# Patient Record
Sex: FEMALE | Race: WHITE | NOT HISPANIC OR LATINO | Employment: UNEMPLOYED | URBAN - NONMETROPOLITAN AREA
[De-identification: names, ages, dates, MRNs, and addresses within clinical notes are randomized per-mention and may not be internally consistent; named-entity substitution may affect disease eponyms.]

---

## 2018-03-12 ENCOUNTER — DOCUMENTATION ONLY (OUTPATIENT)
Dept: FAMILY MEDICINE | Facility: OTHER | Age: 28
End: 2018-03-12

## 2018-03-12 RX ORDER — IBUPROFEN 200 MG
600 TABLET ORAL EVERY 6 HOURS PRN
COMMUNITY
Start: 2016-09-30 | End: 2018-05-01

## 2018-03-12 RX ORDER — FERROUS GLUCONATE 324(38)MG
324 TABLET ORAL
COMMUNITY
Start: 2016-09-30 | End: 2018-05-01

## 2018-03-12 RX ORDER — DOCUSATE SODIUM 100 MG/1
100 CAPSULE, LIQUID FILLED ORAL DAILY PRN
COMMUNITY
Start: 2016-09-30 | End: 2018-05-01

## 2018-03-12 RX ORDER — ACETAMINOPHEN 325 MG/1
650 TABLET ORAL EVERY 4 HOURS PRN
COMMUNITY
Start: 2016-09-30 | End: 2018-05-01

## 2018-03-12 RX ORDER — BREAST PUMP
EACH MISCELLANEOUS
COMMUNITY
Start: 2016-11-09 | End: 2018-05-01

## 2018-04-20 ENCOUNTER — TELEPHONE (OUTPATIENT)
Dept: FAMILY MEDICINE | Facility: OTHER | Age: 28
End: 2018-04-20

## 2018-04-20 DIAGNOSIS — Z32.01 PREGNANCY TEST POSITIVE: Primary | ICD-10-CM

## 2018-04-20 NOTE — TELEPHONE ENCOUNTER
----- Message from Lizabeth Archibald sent at 4/20/2018 10:55 AM CDT -----  Regarding: Initial OB Px  Patient is coming in for her Initial Ob Px on 05/11/2018 at 9:00AM. Patient is unaware of LMP exact date. She thinks she's about 5 1/2 weeks along with her pregnancy.       Lizabeth Archibald on 4/20/2018 at 11:02 AM

## 2018-04-20 NOTE — TELEPHONE ENCOUNTER
Call patient - if she is not sure of LMP, she should have an ultrasound done in the next 1-2 weeks.  Order is placed.  Pia Tong MD

## 2018-04-20 NOTE — TELEPHONE ENCOUNTER
Patient notified of below. She requests to wait for schedule to call get US set up.      Jalyn Bullard LPN.................. 4/20/2018 11:27 AM

## 2018-04-30 ENCOUNTER — HOSPITAL ENCOUNTER (OUTPATIENT)
Dept: ULTRASOUND IMAGING | Facility: OTHER | Age: 28
Discharge: HOME | End: 2018-04-30
Attending: FAMILY MEDICINE | Admitting: FAMILY MEDICINE
Payer: COMMERCIAL

## 2018-04-30 DIAGNOSIS — Z32.01 PREGNANCY TEST POSITIVE: ICD-10-CM

## 2018-04-30 PROCEDURE — 76801 OB US < 14 WKS SINGLE FETUS: CPT

## 2018-04-30 PROCEDURE — 76817 TRANSVAGINAL US OBSTETRIC: CPT

## 2018-05-01 ENCOUNTER — OFFICE VISIT (OUTPATIENT)
Dept: FAMILY MEDICINE | Facility: OTHER | Age: 28
End: 2018-05-01
Attending: FAMILY MEDICINE
Payer: COMMERCIAL

## 2018-05-01 VITALS
SYSTOLIC BLOOD PRESSURE: 112 MMHG | WEIGHT: 143 LBS | DIASTOLIC BLOOD PRESSURE: 54 MMHG | HEIGHT: 65 IN | HEART RATE: 92 BPM | BODY MASS INDEX: 23.82 KG/M2

## 2018-05-01 DIAGNOSIS — Z36.9 PRENATAL SCREENING ENCOUNTER: Primary | ICD-10-CM

## 2018-05-01 LAB
ABO + RH BLD: NORMAL
ABO + RH BLD: NORMAL
B-HCG SERPL-ACNC: NORMAL IU/L
SPECIMEN EXP DATE BLD: NORMAL

## 2018-05-01 PROCEDURE — 84702 CHORIONIC GONADOTROPIN TEST: CPT | Performed by: FAMILY MEDICINE

## 2018-05-01 PROCEDURE — 36415 COLL VENOUS BLD VENIPUNCTURE: CPT | Performed by: FAMILY MEDICINE

## 2018-05-01 PROCEDURE — 99214 OFFICE O/P EST MOD 30 MIN: CPT | Performed by: FAMILY MEDICINE

## 2018-05-01 PROCEDURE — 86901 BLOOD TYPING SEROLOGIC RH(D): CPT | Performed by: FAMILY MEDICINE

## 2018-05-01 PROCEDURE — 86900 BLOOD TYPING SEROLOGIC ABO: CPT | Performed by: FAMILY MEDICINE

## 2018-05-01 RX ORDER — PRENATAL VIT/IRON FUM/FOLIC AC 27MG-0.8MG
1 TABLET ORAL DAILY
Qty: 100 TABLET | Refills: 3 | COMMUNITY
Start: 2018-05-01 | End: 2018-05-15

## 2018-05-01 ASSESSMENT — PAIN SCALES - GENERAL: PAINLEVEL: NO PAIN (0)

## 2018-05-01 NOTE — NURSING NOTE
Patient presents to the clinic today for a follow up from her US.    Jalyn Bullard LPN.................. 5/1/2018 9:49 AM

## 2018-05-01 NOTE — MR AVS SNAPSHOT
After Visit Summary   5/1/2018    Jenny Hadley    MRN: [id number]           Patient Information     Date Of Birth          1990        Visit Information        Provider Department      5/1/2018 9:45 AM Pia Rangel MD [facility]        Today's Diagnoses     Prenatal screening encounter    -  1       Follow-ups after your visit        Your next 10 appointments already scheduled     May 03, 2018  9:00 AM CDT   [facility]    [address] [facility] [address]   [phone]           Please do not eat 10-12 hours before your appointment if you are coming in fasting for labs on lipids, cholesterol, or glucose (sugar). This does not apply to pregnant women. Water, hot tea and black coffee (with nothing added) are okay. Do not drink other fluids, diet soda or chew gum.            May 11, 2018  9:00 AM CDT   New Prenatal with Pia Stafford MD   [facility]    [address]  [place] [address]   [phone]              Future tests that were ordered for you today     Open Future Orders        Priority Expected Expires Ordered    HCG quantitative pregnancy Routine  5/1/2019 5/1/2018            Who to contact     If you have questions or need follow up information about today's clinic visit or your schedule please contact [facility] directly at [phone].  Normal or non-critical lab and imaging results will be communicated to you by MyChart, letter or phone within 4 business days after the clinic has received the results. If you do not hear from us within 7 days, please contact the clinic through [facility] or phone. If you have a critical or abnormal lab result, we will notify you by phone as soon as possible.  Submit refill requests through [facility] or call your  "pharmacy and they will forward the refill request to us. Please allow 3 business days for your refill to be completed.          Additional Information About Your Visit        MyChart Information     [facility] lets you send messages to your doctor, view your test results, renew your prescriptions, schedule appointments and more. To sign up, go to [email or web address] . Click on \"Log in\" on the left side of the screen, which will take you to the Welcome page. Then click on \"Sign up Now\" on the right side of the page.     You will be asked to enter the access code listed below, as well as some personal information. Please follow the directions to create your username and password.     Your access code is: [email or web address]  Expires: 2018  2:00 PM     Your access code will  in 90 days. If you need help or a new code, please call your [place] clinic or [phone].        Care EveryWhere ID     This is your Care EveryWhere ID. This could be used by other organizations to access your [place] medical records  [id number]        Your Vitals Were     Pulse Height Last Period Breastfeeding? BMI (Body Mass Index)       92 5' 5.35\" (1.66 m) 03/10/2018 No 23.54 kg/m2        Blood Pressure from Last 3 Encounters:   18 112/54   16 100/62   16 110/70    Weight from Last 3 Encounters:   18 143 lb (64.9 kg)   16 147 lb 12.8 oz (67 kg)   16 173 lb 6.4 oz (78.7 kg)              We Performed the Following     ABO and Rh     HCG quantitative pregnancy          Today's Medication Changes          These changes are accurate as of 18  2:00 PM.  If you have any questions, ask your nurse or doctor.               Stop taking these medicines if you haven't already. Please contact your care team if you have questions.     acetaminophen 325 MG tablet   Commonly known as:  TYLENOL   Stopped by:  Pia Rangel MD           breast pump Misc   Stopped by:  Pia Rangel MD "           docusate sodium 100 MG capsule   Commonly known as:  COLACE   Stopped by:  Pia Rangel MD           ferrous gluconate 324 (38 Fe) MG tablet   Commonly known as:  FERGON   Stopped by:  Pia Rangel MD           ibuprofen 200 MG tablet   Commonly known as:  ADVIL/MOTRIN   Stopped by:  Pia Rangel MD           IRON PO   Stopped by:  Pia Rangel MD                    Primary Care Provider Office Phone # Fax #    Pia Stafford [phone] [address]  [facility] [address]        Equal Access to Services     [facility]: Hadii aad ku hadasho Soomaali, waaxda luqadaha, qaybta kaalmada adeegyada, dank kemp . So [facility] [phone].    ATENCIÓN: Si habla español, tiene a sagastume disposición servicios gratuitos de asistencia lingüística. Llame al [phone].    We comply with applicable federal civil rights laws and [place] laws. We do not discriminate on the basis of race, color, national origin, age, disability, sex, sexual orientation, or gender identity.            Thank you!     Thank you for choosing [facility] AND [facility]  for your care. Our goal is always to provide you with excellent care. Hearing back from our patients is one way we can continue to improve our services. Please take a few minutes to complete the written survey that you may receive in the mail after your visit with us. Thank you!             Your Updated Medication List - Protect others around you: Learn how to safely use, store and throw away your medicines at www.disposemymeds.org.          This list is accurate as of 5/1/18  2:00 PM.  Always use your most recent med list.                   Brand Name Dispense Instructions for use Diagnosis    prenatal multivitamin plus iron 27-0.8 MG Tabs per tablet     100 tablet    Take 1 tablet by mouth daily

## 2018-05-01 NOTE — PROGRESS NOTES
"Nursing Notes:   Jalyn Bullard LPN  2018 10:17 AM  Signed  Patient presents to the clinic today for a follow up from her US.    Jalyn Bullard LPN.................. 2018 9:49 AM       SUBJECTIVE:   CC:  Jenny Hadley is a 27 year old female who presents to clinic today for the following health issues:  Follow up after ultrasound.    HPI     Jenny Hadley is a 27 year old female is in with her  for follow-up after her ultrasound done yesterday.  She is  2 para 1, presently 7 weeks gestational age by LMP.  She has had some mild cramping, some brown vaginal discharge but no bright red bleeding.  She has felt nauseous, breast tenderness, and fatigue.  No fevers.  Allergies   Allergen Reactions     Cats Itching and Shortness Of Breath     Current Outpatient Prescriptions   Medication     Prenatal Vit-Fe Fumarate-FA (PRENATAL MULTIVITAMIN PLUS IRON) 27-0.8 MG TABS per tablet     No current facility-administered medications for this visit.       Patient Active Problem List    Diagnosis Date Noted     Encounter for prenatal care in third trimester of first pregnancy 2016     Priority: Medium     Past Surgical History:   Procedure Laterality Date     DENTAL SURGERY      wisdom teeth     OTHER SURGICAL HISTORY      2016,MBV636,VAGINAL DELIVERY     Family History   Problem Relation Age of Onset     Breast Cancer Paternal Grandmother      Cancer-breast     Breast Cancer Paternal Aunt      Cancer-breast       Review of Systems     PHQ-2 Score:     PHQ-2 (  [employer]) 2018   Q1: Little interest or pleasure in doing things 0   Q2: Feeling down, depressed or hopeless 0   PHQ-2 Score 0         PHQ-9 SCORE 2016   Total Score 7         OBJECTIVE:     /54 (BP Location: Right arm, Patient Position: Sitting, Cuff Size: Adult Regular)  Pulse 92  Ht 5' 5.35\" (1.66 m)  Wt 143 lb (64.9 kg)  LMP 03/10/2018  Breastfeeding? No  BMI 23.54 kg/m2  Body mass index is 23.54 " kg/(m^2).  Physical Exam   Constitutional:   Tired appearing   Nursing note and vitals reviewed.   Procedure:  US OB < 14 WEEKS SINGLE     Gestational age by LMP: 7 weeks 2 days     Clinical history: Early pregnancy, evaluate size and dates.     Comparisons: None.     Gestation number: There is an intrauterine gestational sac with  irregular margins. No fetal pole or yolk sac is seen.     Cardiac activity:  Not seen     Measurements:      CRL:  Not seen     Gest. Sac:  7 weeks 4 days     Adnexa:  Right ovary not seen. There is a 4.0 x 3.5 x 3.1 cm left  ovarian cyst. Mild free fluid is present in the pelvis.     Ultrasound Age:  7 weeks 4 days     Ultrasound EDC:  12/13/2018         Impression:  Intrauterine gestational sac is irregular. No fetal pole  or yolk sac is seen. This may represent an early pregnancy or a failed  pregnancy. Suggest continued follow-up and serial beta hCG  measurements.     ERASTO RIDDLE MD    Results for orders placed or performed in visit on 05/01/18   HCG quantitative pregnancy   Result Value Ref Range    HCG Quantitative Serum 77191 IU/L   ABO and Rh   Result Value Ref Range    ABO A     RH(D) Pos     Specimen Expires 05/04/2018          ASSESSMENT/PLAN:       ICD-10-CM    1. Prenatal screening encounter Z36.9 HCG quantitative pregnancy     ABO and Rh     HCG quantitative pregnancy            PLAN:  1.  Discussed with patient and her  the differential diagnosis of these current findings.  This could include early pregnancy, ectopic pregnancy, pending miscarriage, molar pregnancy.  Plan will be to have the patient return to clinic in 2 days, she will have a follow-up hCG level done then.  I have asked Dr.: Chávez to check for these results.  If prior to then, patient should develop abdominal pain, vaginal bleeding, she is to contact the clinic and I would recommend an urgent appointment.  Otherwise, next follow-up and follow-up ultrasound timing will depend upon her next  lab values.      GEMMA BAILEY MD  [facility]

## 2018-05-02 ENCOUNTER — NURSE TRIAGE (OUTPATIENT)
Dept: FAMILY MEDICINE | Facility: OTHER | Age: 28
End: 2018-05-02

## 2018-05-02 NOTE — TELEPHONE ENCOUNTER
"Patient states she had an ultrasound and does not likely have a viable pregnancy. She started bleeding about 4 PM today and was told to call if she started bleeding. No openings available with [facility] providers tomorrow. Patient offered and accepted appointment with Dr. Wily Mars MD, FACOG tomorrow afternoon. She will present to ER with any heavy bleeding, abdominal pain, shoulder pain, or if she is feeling weak or lightheaded.    Neelima De La Rosa RN...................5/2/2018 4:40 PM    Reason for Disposition    MILD vaginal bleeding (i.e., clots or similar to menstrual period; not just spotting)    Additional Information    Negative: Shock suspected (e.g., cold/pale/clammy skin, too weak to stand, low BP, rapid pulse)    Negative: Difficult to awaken or acting confused  (e.g., disoriented, slurred speech)    Negative: Passed out (i.e., lost consciousness, collapsed and was not responding)    Negative: Sounds like a life-threatening emergency to the triager    Negative: [1] Vaginal bleeding AND [2] pregnant > 20 weeks    Negative: Not pregnant or pregnancy status unknown    Negative: SEVERE abdominal pain    Negative: [1] SEVERE vaginal bleeding (i.e., soaking 2 pads / hour, large blood clots) AND [2] present 2 or more hours    Negative: [1] MODERATE vaginal bleeding (i.e., soaking 1 pad / hour; clots) AND [2] present > 6 hours    Negative: [1] MODERATE vaginal bleeding (i.e., soaking 1 pad / hour; clots) AND [2] pregnant > 12 weeks    Negative: Passed tissue (e.g., gray-white)    Negative: Shoulder pain    Negative: Pale skin (pallor) of new onset or worsening    Negative: Patient sounds very sick or weak to the triager    Negative: [1] Constant abdominal pain AND [2] present > 2 hours    Negative: Fever > 100.4 F (38.0 C)    Negative: [1] Intermittent lower abdominal pain (e.g., cramping) AND [2] present > 24 hours    Negative: Prior history of \"ectopic pregnancy\" or previous tubal surgery (e.g., tubal " "ligation)    Negative: Burning with urination    Negative: Has IUD    Answer Assessment - Initial Assessment Questions  1. ONSET: \"When did this bleeding start?\"        Less than 20 minutes ago  2. DESCRIPTION: \"Describe the bleeding that you are having.\" \"How much bleeding is there?\"     - SPOTTING: spotting, or pinkish / brownish mucous discharge; does not fill panti-liner or pad     - MILD:  less than 1 pad / hour; less than patient's usual menstrual bleeding    - MODERATE: 1-2 pads / hour; small-medium blood clots (e.g., pea, grape, small coin)     - SEVERE: soaking 2 or more pads/hour for 2 or more hours; bleeding not contained by pads or continuous red blood from vagina; large blood clots (e.g., golf ball, large coin)       Spotting red  3. ABDOMINAL PAIN SEVERITY: If present, ask: \"How bad is it?\"  (e.g., Scale 1-10; mild, moderate, or severe)    - MILD (1-3): doesn't interfere with normal activities, abdomen soft and not tender to touch     - MODERATE (4-7): interferes with normal activities or awakens from sleep, tender to touch     - SEVERE (8-10): excruciating pain, doubled over, unable to do any normal activities      0  4. PREGNANCY: \"Do you know how many weeks or months pregnant you are?\" \"When was the first day of your last normal menstrual period?\"      About 7 weeks  5. HEMODYNAMIC STATUS: \"Are you weak or feeling lightheaded?\" If so, ask: \"Can you stand and walk normally?\"       no  6. OTHER SYMPTOMS: \"What other symptoms are you having with the bleeding?\" (e.g., passed tissue, vaginal discharge, fever, menstrual-type cramps)      no    Protocols used: PREGNANCY - VAGINAL BLEEDING LESS THAN 20 WEEKS [id number]    "

## 2018-05-03 ENCOUNTER — OFFICE VISIT (OUTPATIENT)
Dept: OBGYN | Facility: OTHER | Age: 28
End: 2018-05-03
Attending: OBSTETRICS & GYNECOLOGY
Payer: COMMERCIAL

## 2018-05-03 VITALS
SYSTOLIC BLOOD PRESSURE: 102 MMHG | BODY MASS INDEX: 23.54 KG/M2 | WEIGHT: 143 LBS | DIASTOLIC BLOOD PRESSURE: 62 MMHG | HEART RATE: 82 BPM

## 2018-05-03 DIAGNOSIS — O02.1 MISSED ABORTION: Primary | ICD-10-CM

## 2018-05-03 PROCEDURE — 99213 OFFICE O/P EST LOW 20 MIN: CPT | Performed by: OBSTETRICS & GYNECOLOGY

## 2018-05-03 ASSESSMENT — PAIN SCALES - GENERAL: PAINLEVEL: NO PAIN (0)

## 2018-05-03 NOTE — LETTER
5/3/2018       RE: Jenny Hadley  [address]   [place] [address]     Dear Colleague,    Thank you for referring your patient, Jenny Hadley, to the [facility]. Please see a copy of my visit note below.    OB Consult Note    Jenny Hadley is referred by Pia Rangel due to missed     She is a 27 year old     Patient's last menstrual period was 03/10/2018.     Her prior obstetrical history is notable for  x 1.    Past Medical History:   Diagnosis Date     Encounter for supervision of normal first pregnancy     2016     Past Surgical History:   Procedure Laterality Date     DENTAL SURGERY      wisdom teeth     OTHER SURGICAL HISTORY      2016,FPK250,VAGINAL DELIVERY     Current Outpatient Prescriptions   Medication     Prenatal Vit-Fe Fumarate-FA (PRENATAL MULTIVITAMIN PLUS IRON) 27-0.8 MG TABS per tablet     No current facility-administered medications for this visit.      Allergies   Allergen Reactions     Cats Itching and Shortness Of Breath       REVIEW OF SYSTEMS  Neg except as above    Exam:  /62 (BP Location: Right arm)  Pulse 82  Wt 64.9 kg (143 lb)  LMP 03/10/2018  Breastfeeding? No  BMI 23.54 kg/m2    NAD    Last or relevant US information:   IUP with irregular shaped sac  US repeated in office with no change. No fetal pole with irregular gestational sac. 4 cm simple cyst on the left.      I/P:  Missed     Discussed conservative management versus operative versus medical.  A pos BT  She wishes expectancy at this time.    F/U is scheduled with PCJ next week. Consider repeat US if she passes tissue to ascertain whether AB is completed.    Again, thank you for allowing me to participate in the care of your patient.      Sincerely,    Wily Mars MD

## 2018-05-03 NOTE — PROGRESS NOTES
OB Consult Note    Jenny Hadley is referred by Pia Rangel due to missed     She is a 27 year old     Patient's last menstrual period was 03/10/2018.     Her prior obstetrical history is notable for  x 1.    Past Medical History:   Diagnosis Date     Encounter for supervision of normal first pregnancy     2016     Past Surgical History:   Procedure Laterality Date     DENTAL SURGERY      wisdom teeth     OTHER SURGICAL HISTORY      2016,GZT335,VAGINAL DELIVERY     Current Outpatient Prescriptions   Medication     Prenatal Vit-Fe Fumarate-FA (PRENATAL MULTIVITAMIN PLUS IRON) 27-0.8 MG TABS per tablet     No current facility-administered medications for this visit.      Allergies   Allergen Reactions     Cats Itching and Shortness Of Breath       REVIEW OF SYSTEMS  Neg except as above    Exam:  /62 (BP Location: Right arm)  Pulse 82  Wt 64.9 kg (143 lb)  LMP 03/10/2018  Breastfeeding? No  BMI 23.54 kg/m2    NAD    Last or relevant US information:   IUP with irregular shaped sac  US repeated in office with no change. No fetal pole with irregular gestational sac. 4 cm simple cyst on the left.      I/P:  Missed     Discussed conservative management versus operative versus medical.  A pos BT  She wishes expectancy at this time.    F/U is scheduled with PCJ next week. Consider repeat US if she passes tissue to ascertain whether AB is completed.

## 2018-05-03 NOTE — MR AVS SNAPSHOT
"              After Visit Summary   5/3/2018    Jenny Hadley    MRN: [id number]           Patient Information     Date Of Birth          1990        Visit Information        Provider Department      5/3/2018 2:30 PM Wily Mars MD [facility]        Today's Diagnoses     Missed     -  1       Follow-ups after your visit        Your next 10 appointments already scheduled     May 11, 2018  9:00 AM CDT   New Prenatal with Pia Stafford MD   [facility] ([facility])    [address]  [place] [id number] [phone]              Who to contact     If you have questions or need follow up information about today's clinic visit or your schedule please contact [facility] directly at [phone].  Normal or non-critical lab and imaging results will be communicated to you by GetMyRxhart, letter or phone within 4 business days after the clinic has received the results. If you do not hear from us within 7 days, please contact the clinic through GetMyRxhart or phone. If you have a critical or abnormal lab result, we will notify you by phone as soon as possible.  Submit refill requests through Remote or call your pharmacy and they will forward the refill request to us. Please allow 3 business days for your refill to be completed.          Additional Information About Your Visit        GetMyRxhart Information     [facility] lets you send messages to your doctor, view your test results, renew your prescriptions, schedule appointments and more. To sign up, go to [email or web address] . Click on \"Log in\" on the left side of the screen, which will take you to the Welcome page. Then click on \"Sign up Now\" on the right side of the page.     You will be asked to enter the access code listed below, as well as some personal information. Please follow the directions to create your username and password.     Your " access code is: [email or web address]  Expires: 2018  2:00 PM     Your access code will  in 90 days. If you need help or a new code, please call your [place] clinic or [phone].        Care EveryWhere ID     This is your Care EveryWhere ID. This could be used by other organizations to access your [place] medical records  [id number]        Your Vitals Were     Pulse Last Period Breastfeeding? BMI (Body Mass Index)          82 03/10/2018 No 23.54 kg/m2         Blood Pressure from Last 3 Encounters:   18 102/62   18 112/54   16 100/62    Weight from Last 3 Encounters:   18 64.9 kg (143 lb)   18 64.9 kg (143 lb)   16 67 kg (147 lb 12.8 oz)              Today, you had the following     No orders found for display       Primary Care Provider Office Phone # Fax #    Pia CARBAJAL Ifeanyi Stafford [phone]       [address] [facility] [address]        Equal Access to Services     [facility]: Hadii aad ku hadsav Soabdoul, waaxda lumegan, qaybta kaalshayna cruz, dank kemp . So [facility] [phone].    ATENCIÓN: Si habla español, tiene a sagastume disposición servicios gratuitos de asistencia lingüística. [facility] [phone].    We comply with applicable federal civil rights laws and [place] laws. We do not discriminate on the basis of race, color, national origin, age, disability, sex, sexual orientation, or gender identity.            Thank you!     Thank you for choosing [facility] AND [facility]  for your care. Our goal is always to provide you with excellent care. Hearing back from our patients is one way we can continue to improve our services. Please take a few minutes to complete the written survey that you may receive in the mail after your visit with us. Thank you!             Your Updated Medication List - Protect others around you: Learn how to safely use, store and throw away your medicines at  www.disposemymeds.org.          This list is accurate as of 5/3/18  3:58 PM.  Always use your most recent med list.                   Brand Name Dispense Instructions for use Diagnosis    prenatal multivitamin plus iron 27-0.8 MG Tabs per tablet     100 tablet    Take 1 tablet by mouth daily

## 2018-05-04 PROBLEM — Z78.9 HISTORY OF PRIOR PREGNANCIES: Status: ACTIVE | Noted: 2018-01-01

## 2018-05-11 ENCOUNTER — HOSPITAL ENCOUNTER (OUTPATIENT)
Dept: ULTRASOUND IMAGING | Facility: OTHER | Age: 28
Discharge: HOME | End: 2018-05-11
Attending: FAMILY MEDICINE | Admitting: FAMILY MEDICINE
Payer: COMMERCIAL

## 2018-05-11 ENCOUNTER — PRENATAL OFFICE VISIT (OUTPATIENT)
Dept: FAMILY MEDICINE | Facility: OTHER | Age: 28
End: 2018-05-11
Attending: FAMILY MEDICINE
Payer: COMMERCIAL

## 2018-05-11 VITALS
SYSTOLIC BLOOD PRESSURE: 94 MMHG | WEIGHT: 139 LBS | HEIGHT: 65 IN | DIASTOLIC BLOOD PRESSURE: 60 MMHG | BODY MASS INDEX: 23.16 KG/M2 | HEART RATE: 88 BPM

## 2018-05-11 DIAGNOSIS — O03.4 INCOMPLETE MISCARRIAGE: Primary | ICD-10-CM

## 2018-05-11 DIAGNOSIS — O03.9 MISCARRIAGE: Primary | ICD-10-CM

## 2018-05-11 DIAGNOSIS — O03.4 INCOMPLETE MISCARRIAGE: ICD-10-CM

## 2018-05-11 LAB — B-HCG SERPL-ACNC: NORMAL IU/L

## 2018-05-11 PROCEDURE — 84702 CHORIONIC GONADOTROPIN TEST: CPT | Performed by: FAMILY MEDICINE

## 2018-05-11 PROCEDURE — 99213 OFFICE O/P EST LOW 20 MIN: CPT | Performed by: FAMILY MEDICINE

## 2018-05-11 PROCEDURE — 36415 COLL VENOUS BLD VENIPUNCTURE: CPT | Performed by: FAMILY MEDICINE

## 2018-05-11 PROCEDURE — 76801 OB US < 14 WKS SINGLE FETUS: CPT

## 2018-05-11 PROCEDURE — 76817 TRANSVAGINAL US OBSTETRIC: CPT

## 2018-05-11 NOTE — NURSING NOTE
Patient presents to the clinic today for a follow up on a miscarriage. She started bleeding on 5/3/18.    Jalyn Bullard LPN.................. 5/11/2018 9:00 AM

## 2018-05-11 NOTE — MR AVS SNAPSHOT
"              After Visit Summary   2018    Jenny Hadley    MRN: [id number]           Patient Information     Date Of Birth          1990        Visit Information        Provider Department      2018 9:00 AM Pia Rangel MD [facility]        Today's Diagnoses     Miscarriage    -  1       Follow-ups after your visit        Who to contact     If you have questions or need follow up information about today's clinic visit or your schedule please contact [facility] directly at [phone].  Normal or non-critical lab and imaging results will be communicated to you by MindSnackshart, letter or phone within 4 business days after the clinic has received the results. If you do not hear from us within 7 days, please contact the clinic through EyeEmt or phone. If you have a critical or abnormal lab result, we will notify you by phone as soon as possible.  Submit refill requests through Trustribe or call your pharmacy and they will forward the refill request to us. Please allow 3 business days for your refill to be completed.          Additional Information About Your Visit        MyChart Information     Trustribe lets you send messages to your doctor, view your test results, renew your prescriptions, schedule appointments and more. To sign up, go to [email or web address] . Click on \"Log in\" on the left side of the screen, which will take you to the Welcome page. Then click on \"Sign up Now\" on the right side of the page.     You will be asked to enter the access code listed below, as well as some personal information. Please follow the directions to create your username and password.     Your access code is: [email or web address]  Expires: 2018  2:00 PM     Your access code will  in 90 days. If you need help or a new code, please call your [facility] clinic or [phone].        Care EveryWhere ID     This is your Care EveryWhere ID. This could be " "used by other organizations to access your [place] medical records  [id number]        Your Vitals Were     Pulse Height Last Period Breastfeeding? BMI (Body Mass Index)       88 5' 5.35\" (1.66 m) 05/02/2018 No 22.88 kg/m2        Blood Pressure from Last 3 Encounters:   05/11/18 94/60   05/03/18 102/62   05/01/18 112/54    Weight from Last 3 Encounters:   05/11/18 139 lb (63 kg)   05/03/18 143 lb (64.9 kg)   05/01/18 143 lb (64.9 kg)              We Performed the Following     HCG quantitative pregnancy        Primary Care Provider Office Phone # Fax #    Pia CARBAJAL Ifeanyi Stafford [phone]       [address] [place] [address]        Equal Access to Services     [facility] : Hadii aad ku hadasho Soomaali, waaxda luqadaha, qaybta kaalmada aderhondayadeepa, dank kemp . So [facility] [phone].    ATENCIÓN: Si habla español, tiene a sagastume disposición servicios gratuitos de asistencia lingüística. Kim al [phone].    We comply with applicable federal civil rights laws and [place] laws. We do not discriminate on the basis of race, color, national origin, age, disability, sex, sexual orientation, or gender identity.            Thank you!     Thank you for choosing [facility] AND [facility]  for your care. Our goal is always to provide you with excellent care. Hearing back from our patients is one way we can continue to improve our services. Please take a few minutes to complete the written survey that you may receive in the mail after your visit with us. Thank you!             Your Updated Medication List - Protect others around you: Learn how to safely use, store and throw away your medicines at www.disposemymeds.org.          This list is accurate as of 5/11/18 10:06 AM.  Always use your most recent med list.                   Brand Name Dispense Instructions for use Diagnosis    prenatal multivitamin plus iron 27-0.8 MG Tabs per tablet     100 " tablet    Take 1 tablet by mouth daily

## 2018-05-11 NOTE — PROGRESS NOTES
Nursing Notes:   Jalyn Bullard LPN  2018  9:02 AM  Unsigned  Patient presents to the clinic today for a follow up on a miscarriage. She started bleeding on 5/3/18.    Jalyn Bullard LPN.................. 2018 9:00 AM       SUBJECTIVE:   CC:  Jenny Hadley is a 27 year old female who presents to clinic today for the following health issues:  Follow up of miscarriage.    HPI     Jenny Hadley is a 27 year old female is a  in for follow up of miscarriage.  She had an abnormal ultrasound last week, then started bleeding on 5/3.  Since then, she had an additional day of heavier bleeding, passing some clots - this was on two days.  Thinks that she passed some tissue too.  Now bleeding is at a trickle.  Abdomen has felt firmer, but not really painful.  Positive nausea and tiredness.  Not lightheaded.  Regards to mood, she says that she feels numb, unmotivated.      Allergies   Allergen Reactions     Cats Itching and Shortness Of Breath     Current Outpatient Prescriptions   Medication     Prenatal Vit-Fe Fumarate-FA (PRENATAL MULTIVITAMIN PLUS IRON) 27-0.8 MG TABS per tablet     No current facility-administered medications for this visit.       Patient Active Problem List    Diagnosis Date Noted     Miscarriage 2018     Priority: Medium     History of prior pregnancies 2018     Priority: Medium            Encounter for prenatal care in third trimester of first pregnancy 2016     Priority: Medium     Past Surgical History:   Procedure Laterality Date     DENTAL SURGERY      wisdom teeth     Family History   Problem Relation Age of Onset     Breast Cancer Paternal Grandmother      Cancer-breast     Breast Cancer Paternal Aunt      Cancer-breast       Review of Systems   All other systems reviewed and are negative.       PHQ-2 Score:     PHQ-2 (  [employer]) 2018   Q1: Little interest or pleasure in doing things 0 0   Q2: Feeling down, depressed or  "hopeless 0 0   PHQ-2 Score 0 0         PHQ-9 SCORE 2/24/2016   Total Score 7         OBJECTIVE:     BP 94/60 (BP Location: Right arm, Patient Position: Sitting, Cuff Size: Adult Regular)  Pulse 88  Ht 5' 5.35\" (1.66 m)  Wt 139 lb (63 kg)  LMP 05/02/2018  Breastfeeding? No  BMI 22.88 kg/m2  Body mass index is 22.88 kg/(m^2).  Physical Exam   Constitutional: She appears well-developed and well-nourished.   Abdominal:   Patient has mild right lower quadrant tenderness, there is no rebound however.  No palpable mass.   Nursing note and vitals reviewed.       Results for orders placed or performed in visit on 05/01/18   HCG quantitative pregnancy   Result Value Ref Range    HCG Quantitative Serum 80622 IU/L   ABO and Rh   Result Value Ref Range    ABO A     RH(D) Pos     Specimen Expires 05/04/2018          ASSESSMENT/PLAN:       ICD-10-CM    1. Miscarriage O03.9 HCG quantitative pregnancy            PLAN:  1.  Her previous ultrasounds had shown a left ovarian cyst.  Uncertain if this is the cause of her tenderness.  We did discuss that although she has had 2 ultrasounds that did not show an ectopic pregnancy, but still cannot be completely excluded.  Follow-up quantitative hCG will be obtained today.  If this is not substantially decreased, would consider obtaining a follow-up ultrasound today.  She will be contacted with results, and please see result note for additional plan.  2.  Regards to miscarriage in general, we discussed typical length of bleeding, other physical symptoms, mood symptoms.  We discussed continuing prenatal vitamins.  We also discussed planning of future pregnancy.      GEMMA BAILEY MD  [facility] AND \[facility] [place] [facility]\""      Results for orders placed or performed in visit on 05/11/18   HCG quantitative pregnancy   Result Value Ref Range    HCG Quantitative Serum [id number] IU/L     In result note, I had recorded this as a decrease, rather than an INCREASE.  A follow up ultrasound " was ordered, in particular to r/o ectopic pregnancy.  GEMMA BAILEY MD on 5/11/2018 at 5:29 PM

## 2018-05-13 ENCOUNTER — APPOINTMENT (OUTPATIENT)
Dept: GENERAL RADIOLOGY | Facility: OTHER | Age: 28
End: 2018-05-13
Attending: FAMILY MEDICINE
Payer: COMMERCIAL

## 2018-05-13 ENCOUNTER — HOSPITAL ENCOUNTER (EMERGENCY)
Facility: OTHER | Age: 28
Discharge: HOME | End: 2018-05-13
Attending: PHYSICIAN ASSISTANT | Admitting: PHYSICIAN ASSISTANT
Payer: COMMERCIAL

## 2018-05-13 VITALS
HEART RATE: 96 BPM | HEIGHT: 66 IN | TEMPERATURE: 98.4 F | WEIGHT: 139 LBS | RESPIRATION RATE: 15 BRPM | DIASTOLIC BLOOD PRESSURE: 61 MMHG | SYSTOLIC BLOOD PRESSURE: 95 MMHG | OXYGEN SATURATION: 99 % | BODY MASS INDEX: 22.34 KG/M2

## 2018-05-13 DIAGNOSIS — O03.9 MISCARRIAGE: ICD-10-CM

## 2018-05-13 DIAGNOSIS — M54.6 ACUTE RIGHT-SIDED THORACIC BACK PAIN: ICD-10-CM

## 2018-05-13 DIAGNOSIS — K21.9 GASTROESOPHAGEAL REFLUX DISEASE, ESOPHAGITIS PRESENCE NOT SPECIFIED: ICD-10-CM

## 2018-05-13 LAB
ALBUMIN SERPL-MCNC: 3.7 G/DL (ref 3.5–5.7)
ALP SERPL-CCNC: 41 U/L (ref 34–104)
ALT SERPL W P-5'-P-CCNC: 25 U/L (ref 7–52)
ANION GAP SERPL CALCULATED.3IONS-SCNC: 7 MMOL/L (ref 3–14)
AST SERPL W P-5'-P-CCNC: 14 U/L (ref 13–39)
BASOPHILS # BLD AUTO: 0 10E9/L (ref 0–0.2)
BASOPHILS NFR BLD AUTO: 0.3 %
BILIRUB SERPL-MCNC: 1 MG/DL (ref 0.3–1)
BUN SERPL-MCNC: 8 MG/DL (ref 7–25)
CALCIUM SERPL-MCNC: 9 MG/DL (ref 8.6–10.3)
CHLORIDE SERPL-SCNC: 105 MMOL/L (ref 98–107)
CO2 SERPL-SCNC: 24 MMOL/L (ref 21–31)
CREAT SERPL-MCNC: 0.51 MG/DL (ref 0.6–1.2)
DIFFERENTIAL METHOD BLD: ABNORMAL
EOSINOPHIL # BLD AUTO: 0 10E9/L (ref 0–0.7)
EOSINOPHIL NFR BLD AUTO: 0.3 %
ERYTHROCYTE [DISTWIDTH] IN BLOOD BY AUTOMATED COUNT: 11.7 % (ref 10–15)
GFR SERPL CREATININE-BSD FRML MDRD: >90 ML/MIN/1.7M2
GLUCOSE SERPL-MCNC: 92 MG/DL (ref 70–105)
HCT VFR BLD AUTO: 34.5 % (ref 35–47)
HGB BLD-MCNC: 12.3 G/DL (ref 11.7–15.7)
IMM GRANULOCYTES # BLD: 0 10E9/L (ref 0–0.4)
IMM GRANULOCYTES NFR BLD: 0.3 %
LIPASE SERPL-CCNC: 23 U/L (ref 11–82)
LYMPHOCYTES # BLD AUTO: 1.5 10E9/L (ref 0.8–5.3)
LYMPHOCYTES NFR BLD AUTO: 22.7 %
MCH RBC QN AUTO: 29.6 PG (ref 26.5–33)
MCHC RBC AUTO-ENTMCNC: 35.7 G/DL (ref 31.5–36.5)
MCV RBC AUTO: 83 FL (ref 78–100)
MONOCYTES # BLD AUTO: 0.5 10E9/L (ref 0–1.3)
MONOCYTES NFR BLD AUTO: 7.8 %
NEUTROPHILS # BLD AUTO: 4.4 10E9/L (ref 1.6–8.3)
NEUTROPHILS NFR BLD AUTO: 68.6 %
PLATELET # BLD AUTO: 278 10E9/L (ref 150–450)
POTASSIUM SERPL-SCNC: 3.9 MMOL/L (ref 3.5–5.1)
PROT SERPL-MCNC: 6.4 G/DL (ref 6.4–8.9)
RBC # BLD AUTO: 4.16 10E12/L (ref 3.8–5.2)
SODIUM SERPL-SCNC: 136 MMOL/L (ref 134–144)
WBC # BLD AUTO: 6.4 10E9/L (ref 4–11)

## 2018-05-13 PROCEDURE — 85025 COMPLETE CBC W/AUTO DIFF WBC: CPT | Performed by: FAMILY MEDICINE

## 2018-05-13 PROCEDURE — 25000125 ZZHC RX 250: Performed by: FAMILY MEDICINE

## 2018-05-13 PROCEDURE — 99284 EMERGENCY DEPT VISIT MOD MDM: CPT | Mod: 25 | Performed by: PHYSICIAN ASSISTANT

## 2018-05-13 PROCEDURE — 99283 EMERGENCY DEPT VISIT LOW MDM: CPT | Mod: Z6 | Performed by: PHYSICIAN ASSISTANT

## 2018-05-13 PROCEDURE — 80053 COMPREHEN METABOLIC PANEL: CPT | Performed by: FAMILY MEDICINE

## 2018-05-13 PROCEDURE — 83690 ASSAY OF LIPASE: CPT | Performed by: FAMILY MEDICINE

## 2018-05-13 PROCEDURE — 25000132 ZZH RX MED GY IP 250 OP 250 PS 637: Performed by: FAMILY MEDICINE

## 2018-05-13 PROCEDURE — 36415 COLL VENOUS BLD VENIPUNCTURE: CPT | Performed by: FAMILY MEDICINE

## 2018-05-13 PROCEDURE — 71046 X-RAY EXAM CHEST 2 VIEWS: CPT

## 2018-05-13 RX ADMIN — LIDOCAINE HYDROCHLORIDE 30 ML: 20 SOLUTION ORAL; TOPICAL at 12:37

## 2018-05-13 ASSESSMENT — ENCOUNTER SYMPTOMS
COUGH: 0
FATIGUE: 1
CONSTIPATION: 0
BACK PAIN: 1
PALPITATIONS: 0
FEVER: 0
ABDOMINAL PAIN: 1
VOMITING: 0
NAUSEA: 0
DIAPHORESIS: 0
SHORTNESS OF BREATH: 0
EYES NEGATIVE: 1
CHILLS: 0
DIARRHEA: 0

## 2018-05-13 NOTE — DISCHARGE INSTRUCTIONS
Dear MsNelson Leonie,  It was nice to meet you.  As we talked, your vitals are reassuring without any fast heart beat or shortness of breath or low oxygen sats to suggest a blood clot in your lungs currently.      Since we can reproduce your right sided back pain it suggests you have a muscle strain accounting for the back part of your pain.  And you get better with a GI cocktail/antacid trial suggesting your anterior (front) pains are from reflux irritation of your esophagus - something you've had in the past and common in pregnancy.    Continue using your Tums as needed, (if you desire a month trial of zantac you can fill the Rx).  You can use 650-1000mg of Tyelnol 4 times a day; and 400mg of ibuprofen for your back pains up to 4 times a day, (but the ibuprofen is an acidic medication that can burn your stomach so hold if it causes more pain).    Follow up next week as scheduled for recheck of your miscarriage.     I hope you are feeling better soon,  Sincerely,  Dr. Michael Collins        Tips to Control Acid Reflux    To control acid reflux, you ll need to make some basic diet and lifestyle changes. The simple steps outlined below may be all you ll need to ease discomfort.  Watch what you eat    Avoid fatty foods and spicy foods.    Eat fewer acidic foods, such as citrus and tomato-based foods. These can increase symptoms.    Limit drinking alcohol, caffeine, and fizzy beverages. All increase acid reflux.    Try limiting chocolate, peppermint, and spearmint. These can worsen acid reflux in some people.  Watch when you eat    Avoid lying down for 3 hours after eating.    Do not snack before going to bed.  Raise your head  Raising your head and upper body by 4 to 6 inches helps limit reflux when you re lying down. Put blocks under the head of your bed frame to raise it.  Other changes    Lose weight, if you need to    Don t exercise near bedtime    Avoid tight-fitting clothes    Limit aspirin and ibuprofen    Stop  smoking   Date Last Reviewed: 7/1/2016 2000-2017 [employer]. [address] [place] [address], [place] [address]. All rights reserved. This information is not intended as a substitute for professional medical care. Always follow your healthcare professional's instructions.

## 2018-05-13 NOTE — ED TRIAGE NOTES
Pt admit to [facility] [place] via [facility].  Pt reports she's been having a miscarriage for the past week and a half, she's been having back pain that is now a little bit in her chest too, feels a little light headed.  Pt reports her bleed was heavy on Friday but it's slowed down now, is brown in color.  She was 7 weeks pregnant.

## 2018-05-13 NOTE — ED PROVIDER NOTES
History     Chief Complaint   Patient presents with     Back Pain     Chest Pain     Dizziness     The history is provided by the patient.     Jenny Hadley is a 27 year old RHD female with a 20 month old son who is finishing an early spontaneous AB at 7 wks EGA.  She stared bleeding a week and a half ago and flow has diminished.  She has been having mid back pains and substernal/midepigastric pains for several days.  No SOB or BARNETT but feels a little light headed.  She reports no f/c/s or cough.  She has a hx of indigestion/reflux and uses Tums PRN.  She denies personal or family hx of blood clots but her father told her to be checked out for a clot because she is pregnant.  No fast heart rate. She carries her 20 month son on her right hip a lot.    Problem List:    Patient Active Problem List    Diagnosis Date Noted     Miscarriage 2018     Priority: Medium     History of prior pregnancies 2018     Priority: Medium            Encounter for prenatal care in third trimester of first pregnancy 2016     Priority: Medium        Past Medical History:    Past Medical History:   Diagnosis Date     History of prior pregnancies        Past Surgical History:    Past Surgical History:   Procedure Laterality Date     DENTAL SURGERY      wisdom teeth       Family History:    Family History   Problem Relation Age of Onset     Breast Cancer Paternal Grandmother      Cancer-breast     Breast Cancer Paternal Aunt      Cancer-breast       Social History:  Marital Status:   [2]  Social History   Substance Use Topics     Smoking status: Never Smoker     Smokeless tobacco: Never Used     Alcohol use 0.0 oz/week      Comment: occasional        Medications:      Prenatal Vit-Fe Fumarate-FA (PRENATAL MULTIVITAMIN PLUS IRON) 27-0.8 MG TABS per tablet         Review of Systems   Constitutional: Positive for fatigue. Negative for chills, diaphoresis and fever.   HENT: Negative.    Eyes: Negative.   "  Respiratory: Negative for cough and shortness of breath.    Cardiovascular: Positive for chest pain. Negative for palpitations.   Gastrointestinal: Positive for abdominal pain. Negative for constipation, diarrhea, nausea and vomiting.   Genitourinary: Positive for vaginal bleeding (slowing down.).   Musculoskeletal: Positive for back pain.   Skin: Negative.        Physical Exam   BP: 114/74  Pulse: 96  Heart Rate: 68  Temp: 98.7  F (37.1  C)  Resp: 16  Height: 166.4 cm (5' 5.5\")  Weight: 63 kg (139 lb)  SpO2: 97 %  Lying Orthostatic BP: 98/58  Lying Orthostatic Pulse: 63 bpm  Sitting Orthostatic BP: 102/59  Sitting Orthostatic Pulse: 77 bpm  Standing Orthostatic BP: 93/68  Standing Orthostatic Pulse: 105 bpm      Physical Exam   Constitutional: She appears well-developed and well-nourished. No distress.   Pleasant alert comfortable 27 year old female in NAD.  She exhibits no tachypnea or tachycardia or hypoxia.  She has reproducible right mid back tenderness.  She has some reproducible midepigastric tenderness but no sternal/costochondral tenderness.     HENT:   Head: Normocephalic and atraumatic.   Right Ear: External ear normal.   Left Ear: External ear normal.   Nose: Nose normal.   Mouth/Throat: Oropharynx is clear and moist.   Eyes: Conjunctivae and EOM are normal. Pupils are equal, round, and reactive to light. Right eye exhibits no discharge. Left eye exhibits no discharge. No scleral icterus.   Neck: Normal range of motion. Neck supple. No tracheal deviation present. No thyromegaly present.   Cardiovascular: Normal rate, regular rhythm, normal heart sounds and intact distal pulses.    No murmur heard.  Pulmonary/Chest: Effort normal and breath sounds normal. No respiratory distress. She exhibits no tenderness.   Abdominal: Soft. Bowel sounds are normal. She exhibits no distension. There is tenderness. There is no rebound and no guarding.       Musculoskeletal: Normal range of motion. She exhibits " tenderness. She exhibits no edema or deformity.        Thoracic back: She exhibits tenderness (in back muscles.). She exhibits no bony tenderness.        Back:    Lymphadenopathy:     She has no cervical adenopathy.   Neurological: She is alert. She exhibits normal muscle tone.   Skin: Skin is warm. No rash noted. She is not diaphoretic.   Psychiatric: She has a normal mood and affect.   Nursing note and vitals reviewed.      ED Course     ED Course     Procedures               Critical Care time:  none               Results for orders placed or performed during the hospital encounter of 05/13/18 (from the past 24 hour(s))   CBC with platelets differential   Result Value Ref Range    WBC 6.4 4.0 - 11.0 10e9/L    RBC Count 4.16 3.8 - 5.2 10e12/L    Hemoglobin 12.3 11.7 - 15.7 g/dL    Hematocrit 34.5 (L) 35.0 - 47.0 %    MCV 83 78 - 100 fl    MCH 29.6 26.5 - 33.0 pg    MCHC 35.7 31.5 - 36.5 g/dL    RDW 11.7 10.0 - 15.0 %    Platelet Count 278 150 - 450 10e9/L    Diff Method Automated Method     % Neutrophils 68.6 %    % Lymphocytes 22.7 %    % Monocytes 7.8 %    % Eosinophils 0.3 %    % Basophils 0.3 %    % Immature Granulocytes 0.3 %    Absolute Neutrophil 4.4 1.6 - 8.3 10e9/L    Absolute Lymphocytes 1.5 0.8 - 5.3 10e9/L    Absolute Monocytes 0.5 0.0 - 1.3 10e9/L    Absolute Eosinophils 0.0 0.0 - 0.7 10e9/L    Absolute Basophils 0.0 0.0 - 0.2 10e9/L    Abs Immature Granulocytes 0.0 0 - 0.4 10e9/L   Comprehensive metabolic panel   Result Value Ref Range    Sodium 136 134 - 144 mmol/L    Potassium 3.9 3.5 - 5.1 mmol/L    Chloride 105 98 - 107 mmol/L    Carbon Dioxide 24 21 - 31 mmol/L    Anion Gap 7 3 - 14 mmol/L    Glucose 92 70 - 105 mg/dL    Urea Nitrogen 8 7 - 25 mg/dL    Creatinine 0.51 (L) 0.60 - 1.20 mg/dL    GFR Estimate >90 >60 mL/min/1.7m2    GFR Estimate If Black >90 >60 mL/min/1.7m2    Calcium 9.0 8.6 - 10.3 mg/dL    Bilirubin Total 1.0 0.3 - 1.0 mg/dL    Albumin 3.7 3.5 - 5.7 g/dL    Protein Total 6.4 6.4  - 8.9 g/dL    Alkaline Phosphatase 41 34 - 104 U/L    ALT 25 7 - 52 U/L    AST 14 13 - 39 U/L   Lipase   Result Value Ref Range    Lipase 23 11 - 82 U/L   XR Chest 2 Views    Narrative    Procedure:XR CHEST 2 VW    Clinical history:Female, 27 years, mid back pain somewhat relieved by  GI cocktail.  Resolving early Spontaneous Misc at 7wks (>1wk of  bleeding now resolving).;     Technique: Two views are submitted.    Comparison: None    Findings: The cardiac silhouette is normal. The pulmonary vasculature  is normal.    The lungs are clear. Bony structures are unremarkable.      Impression    Impression:  1.   No acute abnormality. No evidence of acute or active disease.    PATSY WALKER MD       Medications   lidocaine (viscous) (XYLOCAINE) 2 % 15 mL, alum & mag hydroxide-simethicone (MYLANTA ES/MAALOX  ES) 15 mL GI Cocktail (30 mLs Oral Given 18 1237)     1:56 PM recheck and reassured patient regarding benign labs, CXR, and vitals and recommend holding off on d-dimer testing as it is likely to be positive during her miscarriage and holding off on CT PE study as her vitals are so reassuring.  F/u for any SOB or worsening CP as needed.  She did get better with the GI cocktail relieving her anterior and midepigastric sx c/w GERD.  She continues with right paraspinous muscle tenderness in the thoracic back c/w carrying her 20 month son on her right hip.    Assessments & Plan (with Medical Decision Making)   27 year old  at the end of a SAB at 7wks EGA with reproducible soreness in her right thoracic back that I suspect is from muscle strain carrying her 20 month son.  And she has substernal and midepigastric pain c/w GERD c/w her hx of same, and relieved by GI cocktail.  Offer Rx Ranitidine but patient would like to continue with Tums PRN.  F/u in clinic as needed for any worsening sx.    I have reviewed the nursing notes.    I have reviewed the findings, diagnosis, plan and need for follow up with the  patient.       Discharge Medication List as of 5/13/2018  2:14 PM          Final diagnoses:   Acute right-sided thoracic back pain   Gastroesophageal reflux disease, esophagitis presence not specified   Miscarriage       5/13/2018   [facility] Hospital[facility] coles MD  05/13/18 [id number]

## 2018-05-13 NOTE — ED AVS SNAPSHOT
[facility]    [address]    [place] [address]    Phone:  [phone]    Fax:  [phone]                                       Jenny Hadley   MRN: [id number]    Department:  [facility]   Date of Visit:  5/13/2018           After Visit Summary Signature Page     I have received my discharge instructions, and my questions have been answered. I have discussed any challenges I see with this plan with the nurse or doctor.    ..........................................................................................................................................  Patient/Patient Representative Signature      ..........................................................................................................................................  Patient Representative Print Name and Relationship to Patient    ..................................................               ................................................  Date                                            Time    ..........................................................................................................................................  Reviewed by Signature/Title    ...................................................              ..............................................  Date                                                            Time

## 2018-05-13 NOTE — ED AVS SNAPSHOT
[facility]    [address]    [place] [address]    Phone:  [phone]    Fax:  [phone]                                       Jenny Hadley   MRN: [id number]    Department:  [facility]   Date of Visit:  5/13/2018           Patient Information     Date Of Birth          1990        Your diagnoses for this visit were:     Acute right-sided thoracic back pain     Gastroesophageal reflux disease, esophagitis presence not specified     Miscarriage        You were seen by Se Knight PA-C and Tavo Collins MD.      Follow-up Information     Go to Pia Rangel MD.    Specialty:  Family Practice    Why:  as scheduled and needed.    Contact information:    [address]  [place] [address]  [phone]          Discharge Instructions       Dear Ms. Hadley,  It was nice to meet you.  As we talked, your vitals are reassuring without any fast heart beat or shortness of breath or low oxygen sats to suggest a blood clot in your lungs currently.      Since we can reproduce your right sided back pain it suggests you have a muscle strain accounting for the back part of your pain.  And you get better with a GI cocktail/antacid trial suggesting your anterior (front) pains are from reflux irritation of your esophagus - something you've had in the past and common in pregnancy.    Continue using your Tums as needed, (if you desire a month trial of zantac you can fill the Rx).  You can use 650-1000mg of Tyelnol 4 times a day; and 400mg of ibuprofen for your back pains up to 4 times a day, (but the ibuprofen is an acidic medication that can burn your stomach so hold if it causes more pain).    Follow up next week as scheduled for recheck of your miscarriage.     I hope you are feeling better soon,  Sincerely,  Dr. Michael Collins        Tips to Control Acid Reflux    To control acid reflux, you ll need to make some basic  diet and lifestyle changes. The simple steps outlined below may be all you ll need to ease discomfort.  Watch what you eat    Avoid fatty foods and spicy foods.    Eat fewer acidic foods, such as citrus and tomato-based foods. These can increase symptoms.    Limit drinking alcohol, caffeine, and fizzy beverages. All increase acid reflux.    Try limiting chocolate, peppermint, and spearmint. These can worsen acid reflux in some people.  Watch when you eat    Avoid lying down for 3 hours after eating.    Do not snack before going to bed.  Raise your head  Raising your head and upper body by 4 to 6 inches helps limit reflux when you re lying down. Put blocks under the head of your bed frame to raise it.  Other changes    Lose weight, if you need to    Don t exercise near bedtime    Avoid tight-fitting clothes    Limit aspirin and ibuprofen    Stop smoking   Date Last Reviewed: 7/1/2016 2000-2017 [employer]. [address] [place] [address]. All rights reserved. This information is not intended as a substitute for professional medical care. Always follow your healthcare professional's instructions.              Discharge References/Attachments     MISCARRIAGE, UNDERSTANDING: DURING A MISCARRIAGE (ENGLISH)      Your next 10 appointments already scheduled     May 14, 2018  1:30 PM CDT   LAB with GH LAB   [facility] ([facility])    [address] [id number] [phone]           Please do not eat 10-12 hours before your appointment if you are coming in fasting for labs on lipids, cholesterol, or glucose (sugar). This does not apply to pregnant women. Water, hot tea and black coffee (with nothing added) are okay. Do not drink other fluids, diet soda or chew gum.              24 Hour Appointment Hotline       To make an appointment at any [facility], call [phone] ([phone]). If you don't have a family doctor or  "clinic, we will help you find one. [facility] clinics are conveniently located to serve the needs of you and your family.             Review of your medicines      Our records show that you are taking the medicines listed below. If these are incorrect, please call your family doctor or clinic.        Dose / Directions Last dose taken    prenatal multivitamin plus iron 27-0.8 MG Tabs per tablet   Dose:  1 tablet   Quantity:  100 tablet        Take 1 tablet by mouth daily   Refills:  3                Procedures and tests performed during your visit     CBC with platelets differential    Comprehensive metabolic panel    Lipase    XR Chest 2 Views      Orders Needing Specimen Collection     None      Pending Results     No orders found from 5/11/2018 to 5/14/2018.            Pending Culture Results     No orders found from 5/11/2018 to 5/14/2018.            Pending Results Instructions     If you had any lab results that were not finalized at the time of your Discharge, you can call the ED Lab Result RN at [phone]. You will be contacted by this team for any positive Lab results or changes in treatment. The nurses are available 7 days a week from 10A to 6:30P.  You can leave a message 24 hours per day and they will return your call.        Thank you for choosing [facility]       Thank you for choosing [facility] for your care. Our goal is always to provide you with excellent care. Hearing back from our patients is one way we can continue to improve our services. Please take a few minutes to complete the written survey that you may receive in the mail after you visit with us. Thank you!        [employer] Information     [employer] lets you send messages to your doctor, view your test results, renew your prescriptions, schedule appointments and more. To sign up, go to [email or web address] . Click on \"Log in\" on the left side of the screen, which will take you to the Welcome page. Then click on \"Sign up Now\" on the right side " of the page.     You will be asked to enter the access code listed below, as well as some personal information. Please follow the directions to create your username and password.     Your access code is: [email or web address]  Expires: 2018  2:00 PM     Your access code will  in 90 days. If you need help or a new code, please call your [facility] clinic or [phone].        Care EveryWhere ID     This is your Care EveryWhere ID. This could be used by other organizations to access your [facility] medical records  [id number]        Equal Access to Services     [facility]: Hadnoe Quiñones, michael chan, brain cruz, dank kemp . So [facility] [phone].    ATENCIÓN: Si habla español, tiene a sagastume disposición servicios gratuitos de asistencia lingüística. Llame al [phone].    We comply with applicable federal civil rights laws and [place] laws. We do not discriminate on the basis of race, color, national origin, age, disability, sex, sexual orientation, or gender identity.            After Visit Summary       This is your record. Keep this with you and show to your community pharmacist(s) and doctor(s) at your next visit.

## 2018-05-14 ENCOUNTER — PRENATAL OFFICE VISIT (OUTPATIENT)
Dept: FAMILY MEDICINE | Facility: OTHER | Age: 28
End: 2018-05-14
Attending: FAMILY MEDICINE
Payer: COMMERCIAL

## 2018-05-14 ENCOUNTER — PRENATAL OFFICE VISIT (OUTPATIENT)
Dept: OBGYN | Facility: OTHER | Age: 28
End: 2018-05-14
Attending: OBSTETRICS & GYNECOLOGY
Payer: COMMERCIAL

## 2018-05-14 VITALS
HEART RATE: 82 BPM | WEIGHT: 137.8 LBS | BODY MASS INDEX: 22.58 KG/M2 | DIASTOLIC BLOOD PRESSURE: 62 MMHG | SYSTOLIC BLOOD PRESSURE: 96 MMHG

## 2018-05-14 VITALS
HEART RATE: 84 BPM | BODY MASS INDEX: 22.34 KG/M2 | SYSTOLIC BLOOD PRESSURE: 94 MMHG | DIASTOLIC BLOOD PRESSURE: 60 MMHG | HEIGHT: 66 IN | WEIGHT: 139 LBS

## 2018-05-14 DIAGNOSIS — O02.0 MOLAR PREGNANCY: Primary | ICD-10-CM

## 2018-05-14 DIAGNOSIS — O03.9 MISCARRIAGE: ICD-10-CM

## 2018-05-14 LAB
B-HCG SERPL-ACNC: NORMAL IU/L
ERYTHROCYTE [DISTWIDTH] IN BLOOD BY AUTOMATED COUNT: 11.7 % (ref 10–15)
HCT VFR BLD AUTO: 34.9 % (ref 35–47)
HGB BLD-MCNC: 12.3 G/DL (ref 11.7–15.7)
MCH RBC QN AUTO: 29.3 PG (ref 26.5–33)
MCHC RBC AUTO-ENTMCNC: 35.2 G/DL (ref 31.5–36.5)
MCV RBC AUTO: 83 FL (ref 78–100)
PLATELET # BLD AUTO: 289 10E9/L (ref 150–450)
RBC # BLD AUTO: 4.2 10E12/L (ref 3.8–5.2)
WBC # BLD AUTO: 6.2 10E9/L (ref 4–11)

## 2018-05-14 PROCEDURE — 84702 CHORIONIC GONADOTROPIN TEST: CPT | Performed by: FAMILY MEDICINE

## 2018-05-14 PROCEDURE — 99213 OFFICE O/P EST LOW 20 MIN: CPT | Performed by: FAMILY MEDICINE

## 2018-05-14 PROCEDURE — 99213 OFFICE O/P EST LOW 20 MIN: CPT | Performed by: OBSTETRICS & GYNECOLOGY

## 2018-05-14 PROCEDURE — 36415 COLL VENOUS BLD VENIPUNCTURE: CPT | Performed by: FAMILY MEDICINE

## 2018-05-14 PROCEDURE — 85027 COMPLETE CBC AUTOMATED: CPT | Performed by: FAMILY MEDICINE

## 2018-05-14 ASSESSMENT — PAIN SCALES - GENERAL
PAINLEVEL: MODERATE PAIN (4)
PAINLEVEL: MODERATE PAIN (4)

## 2018-05-14 NOTE — PROGRESS NOTES
OB Consult Note    Jenny Hadley is referred by Pia Rangel due to abnormal pregnancy    She is a 27 year old     Patient's last menstrual period was 2018. Unknown  She was seen about two weeks ago with what looked to be a blighted ovum. She has not bled or passed tissue and follow up US was performed showing an abnormal appearing pregnancy with apparently hydropic tissue worrisome for molar gestation vs blood clot within the uterus.    Past Medical History:   Diagnosis Date     History of prior pregnancies          Past Surgical History:   Procedure Laterality Date     DENTAL SURGERY      wisdom teeth     Current Outpatient Prescriptions   Medication     Prenatal Vit-Fe Fumarate-FA (PRENATAL MULTIVITAMIN PLUS IRON) 27-0.8 MG TABS per tablet     No current facility-administered medications for this visit.      Allergies   Allergen Reactions     Cats Itching and Shortness Of Breath       REVIEW OF SYSTEMS  General: negative  Resp: No shortness of breath, dyspnea on exertion, cough, or hemoptysis  CV: negative  GI: negative  : negative    Exam:  Constitutional: healthy, alert and no distress  US exam reviewed with patient and her .    Last or relevant US information: 18    Results for orders placed or performed in visit on 18   HCG quantitative pregnancy   Result Value Ref Range    HCG Quantitative Serum 251013 IU/L   **CBC W Plt No Diff FUTURE anytime   Result Value Ref Range    WBC 6.2 4.0 - 11.0 10e9/L    RBC Count 4.20 3.8 - 5.2 10e12/L    Hemoglobin 12.3 11.7 - 15.7 g/dL    Hematocrit 34.9 (L) 35.0 - 47.0 %    MCV 83 78 - 100 fl    MCH 29.3 26.5 - 33.0 pg    MCHC 35.2 31.5 - 36.5 g/dL    RDW 11.7 10.0 - 15.0 %    Platelet Count 289 150 - 450 10e9/L   A pos BT    I/P:  Missed  vs molar pregnancy.  Will list her for Suction D&C. Discussed following hcg to zero and avoiding pregnancy for 6-12 months before trying again to lessen the likelihood of  recurrence. Discussed implications of incomplete mole and treatment of GTD with chemotherapy if indicated.  Discussed risks and alternatives and she and her spouse agree to proceed.    F/u in 2 weeks  with Dr. Mars.     Wily Mars MD FACOG  11:59 AM 5/14/2018

## 2018-05-14 NOTE — NURSING NOTE
"Date of Surgery: 5/16/18  Type of Surgery: Suction Dilation & Curettage  Surgeon: Dr. Mars    Patient's consents were signed and appropriate appointments were scheduled by the Unit [address] . Patient was given surgical folder. Surgical forms were faxed to [phone] and [phone], copies made and kept for informative purposes, and originals were delivered to Day-surgery. Patient denies questions at this time.        STOP BANG    Fever/Chills or other infectious symptoms in past month? No  >10 pound weight loss in the past 2 months? No  Health Care Directive on file? No  History of blood transfusions? No  Td up to date? Yes  History of VRE/MRSA? No      Obstructive Sleep Apnea screening    Preoperative Evaluation: Obstructive Sleep Apnea screening    S: Snore -  Do you snore loudly? (louder than talking or loud enough to be heard through closed doors)No  T: Tired - Do you often feel tired, fatigued, or sleepy during the daytime?No  O: Observed - Has anyone ever observed you stop breathing during your sleep?No  P: Pressure - Do you have or are you being treated for high blood pressure?No  B: BMI - BMI greater than 35kg/m2?No  A: Age - Age over 50 years old?No  N: Neck - Neck circumference greater than 40 cm?No  G: Gender - Gender: Male?No    Total number of \"YES\" responses:  0    Scoring: Low risk of SIDDHARTHA 0-2  At Risk of SIDDHARTHA: >3 High Risk of SIDDHARTHA: 5-8      Total yes answers in SIDDHARTHA section:    Low risk 0-2  At risk 3-4  High risk 5-8    Lenore Can............. 5/14/2018 11:56 AM     "

## 2018-05-14 NOTE — MR AVS SNAPSHOT
"              After Visit Summary   5/14/2018    Jenny Hadley    MRN: [id number]           Patient Information     Date Of Birth          1990        Visit Information        Provider Department      5/14/2018 11:00 AM Pia Rangel MD [facility]        Today's Diagnoses     Molar pregnancy    -  1       Follow-ups after your visit        Your next 10 appointments already scheduled     May 30, 2018  9:30 AM CDT   SHORT with Betty Holliday MD   [facility] ([facility])    [address]  [place] [id number] [phone]              Who to contact     If you have questions or need follow up information about today's clinic visit or your schedule please contact [facility] directly at [phone].  Normal or non-critical lab and imaging results will be communicated to you by [employer], letter or phone within 4 business days after the clinic has received the results. If you do not hear from us within 7 days, please contact the clinic through [employer] or phone. If you have a critical or abnormal lab result, we will notify you by phone as soon as possible.  Submit refill requests through [employer] or call your pharmacy and they will forward the refill request to us. Please allow 3 business days for your refill to be completed.          Additional Information About Your Visit        MyChart Information     [employer] lets you send messages to your doctor, view your test results, renew your prescriptions, schedule appointments and more. To sign up, go to [email or web address] . Click on \"Log in\" on the left side of the screen, which will take you to the Welcome page. Then click on \"Sign up Now\" on the right side of the page.     You will be asked to enter the access code listed below, as well as some personal information. Please follow the directions to create your username and password.     Your " "access code is: [email or web address]  Expires: 2018  2:00 PM     Your access code will  in 90 days. If you need help or a new code, please call your [facility] clinic or [phone].        Care EveryWhere ID     This is your Care EveryWhere ID. This could be used by other organizations to access your [facility] medical records  [id number]        Your Vitals Were     Pulse Height Last Period Breastfeeding? BMI (Body Mass Index)       84 5' 5.5\" (1.664 m) 2018 No 22.78 kg/m2        Blood Pressure from Last 3 Encounters:   18 96/62   18 94/60   18 95/61    Weight from Last 3 Encounters:   18 137 lb 12.8 oz (62.5 kg)   18 139 lb (63 kg)   18 139 lb (63 kg)              Today, you had the following     No orders found for display       Primary Care Provider Office Phone # Fax #    Pia RAVEN Stafford [phone]       [address] [facility] [address]        Equal Access to Services     [facility]: Hadii aad ku hadasho Soomaali, waaxda luqadaha, qaybta kaalmada adeegyada, dank kemp ah. So [facility] [phone].    ATENCIÓN: Si habla español, tiene a sagastume disposición servicios gratuitos de asistencia lingüística. Llame al [phone].    We comply with applicable federal civil rights laws and [place] laws. We do not discriminate on the basis of race, color, national origin, age, disability, sex, sexual orientation, or gender identity.            Thank you!     Thank you for choosing [facility] AND [facility]  for your care. Our goal is always to provide you with excellent care. Hearing back from our patients is one way we can continue to improve our services. Please take a few minutes to complete the written survey that you may receive in the mail after your visit with us. Thank you!             Your Updated Medication List - Protect others around you: Learn how to safely use, store and throw away your " medicines at www.disposemymeds.org.          This list is accurate as of 5/14/18  1:30 PM.  Always use your most recent med list.                   Brand Name Dispense Instructions for use Diagnosis    prenatal multivitamin plus iron 27-0.8 MG Tabs per tablet     100 tablet    Take 1 tablet by mouth daily

## 2018-05-14 NOTE — NURSING NOTE
Patient presents to the clinic today for a follow up.    Jalyn Bullard LPN.................. 5/14/2018 11:04 AM

## 2018-05-14 NOTE — MR AVS SNAPSHOT
"              After Visit Summary   2018    Jenny Hadley    MRN: [id number]           Patient Information     Date Of Birth          1990        Visit Information        Provider Department      2018 11:30 AM Wily Mars MD [facility]        Today's Diagnoses     Molar pregnancy    -  1       Follow-ups after your visit        Who to contact     If you have questions or need follow up information about today's clinic visit or your schedule please contact [facility] directly at [phone].  Normal or non-critical lab and imaging results will be communicated to you by [employer], letter or phone within 4 business days after the clinic has received the results. If you do not hear from us within 7 days, please contact the clinic through [employer] or phone. If you have a critical or abnormal lab result, we will notify you by phone as soon as possible.  Submit refill requests through "[employer]" or call your pharmacy and they will forward the refill request to us. Please allow 3 business days for your refill to be completed.          Additional Information About Your Visit        MyChart Information     "[employer] lets you send messages to your doctor, view your test results, renew your prescriptions, schedule appointments and more. To sign up, go to [email or web address]" . Click on \"Log in\" on the left side of the screen, which will take you to the Welcome page. Then click on \"Sign up Now\" on the right side of the page.     You will be asked to enter the access code listed below, as well as some personal information. Please follow the directions to create your username and password.     Your access code is: [email or web address]  Expires: 2018  2:00 PM     Your access code will  in 90 days. If you need help or a new code, please call your [facility] or [phone].        Care EveryWhere ID     This is your Care EveryWhere ID. This could be used by " other organizations to access your [facility] medical records  [id number]        Your Vitals Were     Pulse Last Period BMI (Body Mass Index)             82 05/02/2018 22.58 kg/m2          Blood Pressure from Last 3 Encounters:   05/14/18 96/62   05/14/18 94/60   05/13/18 95/61    Weight from Last 3 Encounters:   05/14/18 62.5 kg (137 lb 12.8 oz)   05/14/18 63 kg (139 lb)   05/13/18 63 kg (139 lb)              Today, you had the following     No orders found for display       Primary Care Provider Office Phone # Fax #    Pia CARBAJAL Ifeanyi Stafford [phone] [address] [facility] [address]        Equal Access to Services     [facility] : Satish tongo Soabdoul, waaxda luqadaha, qaybta kaalmada adeegyada, dank kemp . So [facility] [phone].    ATENCIÓN: Si habla español, tiene a sagastume disposición servicios gratuitos de asistencia lingüística. Llame al [phone].    We comply with applicable federal civil rights laws and [place] laws. We do not discriminate on the basis of race, color, national origin, age, disability, sex, sexual orientation, or gender identity.            Thank you!     Thank you for choosing [facility] AND hospitals  for your care. Our goal is always to provide you with excellent care. Hearing back from our patients is one way we can continue to improve our services. Please take a few minutes to complete the written survey that you may receive in the mail after your visit with us. Thank you!             Your Updated Medication List - Protect others around you: Learn how to safely use, store and throw away your medicines at www.disposemymeds.org.          This list is accurate as of 5/14/18 12:14 PM.  Always use your most recent med list.                   Brand Name Dispense Instructions for use Diagnosis    prenatal multivitamin plus iron 27-0.8 MG Tabs per tablet     100 tablet    Take 1 tablet by mouth daily

## 2018-05-14 NOTE — PROGRESS NOTES
Nursing Notes:   Jalyn Bullard LPN  2018 11:11 AM  Signed  Patient presents to the clinic today for a follow up.    Jalyn Bullard LPN.................. 2018 11:04 AM       SUBJECTIVE:   CC:  Jenny Hadley is a 27 year old female who presents to clinic today for the following health issues: Follow-up of abnormal ultrasound and increasing hCG levels    HPI  Jenny Hadley is a 27 year old female presents for follow-up of abnormal obstetrical ultrasound and increasing hCG levels.  When seen last week, her hCG level had increased, was above 240,000.  She is having lower abdomen tenderness.  An ultrasound was obtained.  There is no evidence of ectopic pregnancy.  However, the echogenic pattern within the uterus was abnormal, molar pregnancy was considered.  The case was discussed with Dr. Gilbert, who recommended close follow-up in consultation.  This weekend, she was having some upper back pain, went to the emergency department for this.  That has improved.  Also, after her visit on Friday, she did have some increased vaginal bleeding, this lasted for a day.    She has been tired.  No headaches.  Moderate nausea.    A positive blood type     Allergies   Allergen Reactions     Cats Itching and Shortness Of Breath     Current Outpatient Prescriptions   Medication     Prenatal Vit-Fe Fumarate-FA (PRENATAL MULTIVITAMIN PLUS IRON) 27-0.8 MG TABS per tablet     No current facility-administered medications for this visit.       Patient Active Problem List    Diagnosis Date Noted     History of prior pregnancies 2018     Priority: Medium            Encounter for prenatal care in third trimester of first pregnancy 2016     Priority: Medium     Past Surgical History:   Procedure Laterality Date     DENTAL SURGERY      wisdom teeth     Family History   Problem Relation Age of Onset     Breast Cancer Paternal Grandmother      Cancer-breast     Breast Cancer Paternal Aunt       "Cancer-breast       Review of Systems     PHQ-2 Score:     PHQ-2 ( 1999 Pf[employer]) 5/14/2018 5/11/2018   Q1: Little interest or pleasure in doing things 0 0   Q2: Feeling down, depressed or hopeless 0 0   PHQ-2 Score 0 0         PHQ-9 SCORE 2/24/2016   Total Score 7         OBJECTIVE:     BP 94/60 (BP Location: Right arm, Patient Position: Sitting, Cuff Size: Adult Regular)  Pulse 84  Ht 5' 5.5\" (1.664 m)  Wt 139 lb (63 kg)  LMP 05/02/2018  Breastfeeding? No  BMI 22.78 kg/m2  Body mass index is 22.78 kg/(m^2).  Physical Exam   Constitutional: She appears well-developed and well-nourished.   Cardiovascular: Normal rate and regular rhythm.    Pulmonary/Chest: Breath sounds normal.   Nursing note and vitals reviewed.       Results for orders placed or performed in visit on 05/14/18   HCG quantitative pregnancy   Result Value Ref Range    HCG Quantitative Serum 523332 IU/L   **CBC W Plt No Diff FUTURE anytime   Result Value Ref Range    WBC 6.2 4.0 - 11.0 10e9/L    RBC Count 4.20 3.8 - 5.2 10e12/L    Hemoglobin 12.3 11.7 - 15.7 g/dL    Hematocrit 34.9 (L) 35.0 - 47.0 %    MCV 83 78 - 100 fl    MCH 29.3 26.5 - 33.0 pg    MCHC 35.2 31.5 - 36.5 g/dL    RDW 11.7 10.0 - 15.0 %    Platelet Count 289 150 - 450 10e9/L         ASSESSMENT/PLAN:       ICD-10-CM    1. Molar pregnancy O02.0             PLAN:  1.  Ultrasound report and images were reviewed with Dr. Mars.  He will see the patient today in consultation.  Diagnosis of molar pregnancy was discussed with the patient today.  We discussed that definitive care would be surgical.  Follow-up testing and monitoring, in particular based on pathology, discussed.  Patient has an OB/GYN appointment directly following this visit.      GEMMA BAILEY MD  [facility]    "

## 2018-05-15 ENCOUNTER — ANESTHESIA EVENT (OUTPATIENT)
Dept: SURGERY | Facility: OTHER | Age: 28
End: 2018-05-15
Payer: COMMERCIAL

## 2018-05-16 ENCOUNTER — ANESTHESIA (OUTPATIENT)
Dept: SURGERY | Facility: OTHER | Age: 28
End: 2018-05-16
Payer: COMMERCIAL

## 2018-05-16 ENCOUNTER — HOSPITAL ENCOUNTER (OUTPATIENT)
Facility: OTHER | Age: 28
Discharge: HOME | End: 2018-05-16
Attending: OBSTETRICS & GYNECOLOGY | Admitting: OBSTETRICS & GYNECOLOGY
Payer: COMMERCIAL

## 2018-05-16 ENCOUNTER — SURGERY (OUTPATIENT)
Age: 28
End: 2018-05-16

## 2018-05-16 VITALS
SYSTOLIC BLOOD PRESSURE: 100 MMHG | DIASTOLIC BLOOD PRESSURE: 57 MMHG | RESPIRATION RATE: 16 BRPM | TEMPERATURE: 97.9 F | BODY MASS INDEX: 22.45 KG/M2 | OXYGEN SATURATION: 100 % | WEIGHT: 137 LBS

## 2018-05-16 DIAGNOSIS — O02.1 MISSED ABORTION: Primary | ICD-10-CM

## 2018-05-16 PROCEDURE — 88305 TISSUE EXAM BY PATHOLOGIST: CPT

## 2018-05-16 PROCEDURE — 59820 CARE OF MISCARRIAGE: CPT | Performed by: NURSE ANESTHETIST, CERTIFIED REGISTERED

## 2018-05-16 PROCEDURE — 25000125 ZZHC RX 250: Performed by: OBSTETRICS & GYNECOLOGY

## 2018-05-16 PROCEDURE — 25000125 ZZHC RX 250: Performed by: NURSE ANESTHETIST, CERTIFIED REGISTERED

## 2018-05-16 PROCEDURE — 37000009 ZZH ANESTHESIA TECHNICAL FEE, EACH ADDTL 15 MIN: Performed by: OBSTETRICS & GYNECOLOGY

## 2018-05-16 PROCEDURE — 71000027 ZZH RECOVERY PHASE 2 EACH 15 MINS: Performed by: OBSTETRICS & GYNECOLOGY

## 2018-05-16 PROCEDURE — 25000128 H RX IP 250 OP 636: Performed by: NURSE ANESTHETIST, CERTIFIED REGISTERED

## 2018-05-16 PROCEDURE — 40000306 ZZH STATISTIC PRE PROC ASSESS II: Performed by: OBSTETRICS & GYNECOLOGY

## 2018-05-16 PROCEDURE — 27210794 ZZH OR GENERAL SUPPLY STERILE: Performed by: OBSTETRICS & GYNECOLOGY

## 2018-05-16 PROCEDURE — 59820 CARE OF MISCARRIAGE: CPT | Performed by: OBSTETRICS & GYNECOLOGY

## 2018-05-16 PROCEDURE — 37000008 ZZH ANESTHESIA TECHNICAL FEE, 1ST 30 MIN: Performed by: OBSTETRICS & GYNECOLOGY

## 2018-05-16 PROCEDURE — 36000050 ZZH SURGERY LEVEL 2 1ST 30 MIN: Performed by: OBSTETRICS & GYNECOLOGY

## 2018-05-16 PROCEDURE — 36000052 ZZH SURGERY LEVEL 2 EA 15 ADDTL MIN: Performed by: OBSTETRICS & GYNECOLOGY

## 2018-05-16 RX ORDER — KETAMINE HYDROCHLORIDE 50 MG/ML
INJECTION, SOLUTION INTRAMUSCULAR; INTRAVENOUS PRN
Status: DISCONTINUED | OUTPATIENT
Start: 2018-05-16 | End: 2018-05-16

## 2018-05-16 RX ORDER — LIDOCAINE 40 MG/G
CREAM TOPICAL
Status: DISCONTINUED | OUTPATIENT
Start: 2018-05-16 | End: 2018-05-16 | Stop reason: HOSPADM

## 2018-05-16 RX ORDER — PROPOFOL 10 MG/ML
INJECTION, EMULSION INTRAVENOUS CONTINUOUS PRN
Status: DISCONTINUED | OUTPATIENT
Start: 2018-05-16 | End: 2018-05-16

## 2018-05-16 RX ORDER — FENTANYL CITRATE 50 UG/ML
INJECTION, SOLUTION INTRAMUSCULAR; INTRAVENOUS PRN
Status: DISCONTINUED | OUTPATIENT
Start: 2018-05-16 | End: 2018-05-16

## 2018-05-16 RX ORDER — ONDANSETRON 2 MG/ML
INJECTION INTRAMUSCULAR; INTRAVENOUS PRN
Status: DISCONTINUED | OUTPATIENT
Start: 2018-05-16 | End: 2018-05-16

## 2018-05-16 RX ORDER — PROPOFOL 10 MG/ML
INJECTION, EMULSION INTRAVENOUS PRN
Status: DISCONTINUED | OUTPATIENT
Start: 2018-05-16 | End: 2018-05-16

## 2018-05-16 RX ORDER — MEPERIDINE HYDROCHLORIDE 50 MG/ML
12.5 INJECTION INTRAMUSCULAR; INTRAVENOUS; SUBCUTANEOUS
Status: DISCONTINUED | OUTPATIENT
Start: 2018-05-16 | End: 2018-05-16 | Stop reason: HOSPADM

## 2018-05-16 RX ORDER — HYDROMORPHONE HYDROCHLORIDE 1 MG/ML
.3-.5 INJECTION, SOLUTION INTRAMUSCULAR; INTRAVENOUS; SUBCUTANEOUS EVERY 10 MIN PRN
Status: DISCONTINUED | OUTPATIENT
Start: 2018-05-16 | End: 2018-05-16 | Stop reason: HOSPADM

## 2018-05-16 RX ORDER — ACETAMINOPHEN 10 MG/ML
INJECTION, SOLUTION INTRAVENOUS PRN
Status: DISCONTINUED | OUTPATIENT
Start: 2018-05-16 | End: 2018-05-16

## 2018-05-16 RX ORDER — SODIUM CHLORIDE, SODIUM LACTATE, POTASSIUM CHLORIDE, CALCIUM CHLORIDE 600; 310; 30; 20 MG/100ML; MG/100ML; MG/100ML; MG/100ML
INJECTION, SOLUTION INTRAVENOUS CONTINUOUS
Status: DISCONTINUED | OUTPATIENT
Start: 2018-05-16 | End: 2018-05-16 | Stop reason: HOSPADM

## 2018-05-16 RX ORDER — LIDOCAINE HYDROCHLORIDE 20 MG/ML
INJECTION, SOLUTION INFILTRATION; PERINEURAL PRN
Status: DISCONTINUED | OUTPATIENT
Start: 2018-05-16 | End: 2018-05-16

## 2018-05-16 RX ORDER — BUPIVACAINE HYDROCHLORIDE AND EPINEPHRINE 5; 5 MG/ML; UG/ML
INJECTION, SOLUTION PERINEURAL PRN
Status: DISCONTINUED | OUTPATIENT
Start: 2018-05-16 | End: 2018-05-16 | Stop reason: HOSPADM

## 2018-05-16 RX ORDER — ONDANSETRON 2 MG/ML
4 INJECTION INTRAMUSCULAR; INTRAVENOUS EVERY 30 MIN PRN
Status: DISCONTINUED | OUTPATIENT
Start: 2018-05-16 | End: 2018-05-16 | Stop reason: HOSPADM

## 2018-05-16 RX ORDER — NALOXONE HYDROCHLORIDE 0.4 MG/ML
.1-.4 INJECTION, SOLUTION INTRAMUSCULAR; INTRAVENOUS; SUBCUTANEOUS
Status: DISCONTINUED | OUTPATIENT
Start: 2018-05-16 | End: 2018-05-16 | Stop reason: HOSPADM

## 2018-05-16 RX ORDER — ONDANSETRON 4 MG/1
4 TABLET, ORALLY DISINTEGRATING ORAL EVERY 30 MIN PRN
Status: DISCONTINUED | OUTPATIENT
Start: 2018-05-16 | End: 2018-05-16 | Stop reason: HOSPADM

## 2018-05-16 RX ORDER — FENTANYL CITRATE 50 UG/ML
25-50 INJECTION, SOLUTION INTRAMUSCULAR; INTRAVENOUS EVERY 5 MIN PRN
Status: DISCONTINUED | OUTPATIENT
Start: 2018-05-16 | End: 2018-05-16 | Stop reason: HOSPADM

## 2018-05-16 RX ADMIN — PROPOFOL 100 MCG/KG/MIN: 10 INJECTION, EMULSION INTRAVENOUS at 11:27

## 2018-05-16 RX ADMIN — BUPIVACAINE HYDROCHLORIDE AND EPINEPHRINE BITARTRATE 5 ML: 5; .0091 INJECTION, SOLUTION PERINEURAL at 11:52

## 2018-05-16 RX ADMIN — Medication 125 ML/HR: at 11:44

## 2018-05-16 RX ADMIN — FENTANYL CITRATE 25 MCG: 50 INJECTION, SOLUTION INTRAMUSCULAR; INTRAVENOUS at 11:47

## 2018-05-16 RX ADMIN — PROPOFOL 80 MG: 10 INJECTION, EMULSION INTRAVENOUS at 11:26

## 2018-05-16 RX ADMIN — KETAMINE HYDROCHLORIDE 30 MG: 50 INJECTION, SOLUTION INTRAMUSCULAR; INTRAVENOUS at 11:28

## 2018-05-16 RX ADMIN — SODIUM CHLORIDE, SODIUM LACTATE, POTASSIUM CHLORIDE, AND CALCIUM CHLORIDE: 600; 310; 30; 20 INJECTION, SOLUTION INTRAVENOUS at 09:05

## 2018-05-16 RX ADMIN — FENTANYL CITRATE 25 MCG: 50 INJECTION, SOLUTION INTRAMUSCULAR; INTRAVENOUS at 11:30

## 2018-05-16 RX ADMIN — MIDAZOLAM 2 MG: 1 INJECTION INTRAMUSCULAR; INTRAVENOUS at 11:23

## 2018-05-16 RX ADMIN — LIDOCAINE HYDROCHLORIDE 1 ML: 10 INJECTION, SOLUTION EPIDURAL; INFILTRATION; INTRACAUDAL; PERINEURAL at 09:05

## 2018-05-16 RX ADMIN — LIDOCAINE HYDROCHLORIDE 80 MG: 20 INJECTION, SOLUTION INFILTRATION; PERINEURAL at 11:26

## 2018-05-16 RX ADMIN — ACETAMINOPHEN 1000 MG: 10 INJECTION, SOLUTION INTRAVENOUS at 11:29

## 2018-05-16 RX ADMIN — ONDANSETRON 4 MG: 2 INJECTION INTRAMUSCULAR; INTRAVENOUS at 11:26

## 2018-05-16 NOTE — IP AVS SNAPSHOT
[facility]    [address]    [place] [address]    Phone:  [phone]    Fax:  [phone]                                       After Visit Summary   5/16/2018    Jenny Hadley    MRN: [id number]           After Visit Summary Signature Page     I have received my discharge instructions, and my questions have been answered. I have discussed any challenges I see with this plan with the nurse or doctor.    ..........................................................................................................................................  Patient/Patient Representative Signature      ..........................................................................................................................................  Patient Representative Print Name and Relationship to Patient    ..................................................               ................................................  Date                                            Time    ..........................................................................................................................................  Reviewed by Signature/Title    ...................................................              ..............................................  Date                                                            Time

## 2018-05-16 NOTE — OP NOTE
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  She was prepped and draped sterile and positioned in dorsal lithotomy position in candy cane stirrups.  A speculum was placed in thevagina and the cervix was visualized.  0.5 % Marcaine was used to infiltrate the cervix anteriorly and a paracervical block was injected at 3 and 9 o'clock.  The cervix was grasped with a tenaculum and a number  8 curved suction curette was inserted.  Suction was administered and 3 passes were performed productive of moderate amounts of tissue.  Additional sharp curettage was performed gently with no further production of tissue andcircumferential uterine cry was noted.  Bleeding was minimal at that point.  The tenaculum was released from the cervix and the tenaculum site treated with silver nitrate.  Pathology was submitted as products of conception.The speculum was removed and the patient awakened form anesthesia and taken to PACU in stable condition. No Rhogam required.      Wily Mars MDFAMIGNON  11:48 AM 2018

## 2018-05-16 NOTE — IP AVS SNAPSHOT
MRN:[id number]                      After Visit Summary   5/16/2018    Jenny Hadley    MRN: [id number]           Thank you!     Thank you for choosing [facility] for your care. Our goal is always to provide you with excellent care. Hearing back from our patients is one way we can continue to improve our services. Please take a few minutes to complete the written survey that you may receive in the mail after you visit with us. Thank you!        Patient Information     Date Of Birth          1990        About your hospital stay     You were admitted on:  May 16, 2018 You last received care in the:  [facility]    You were discharged on:  May 16, 2018       Who to Call     For medical emergencies, please call 911.  For non-urgent questions about your medical care, please call your primary care provider or clinic, [phone]  For questions related to your surgery, please call your surgery clinic        Attending Provider     Provider Specialty    Wily Mars MD OB/Gyn       Primary Care Provider Office Phone # Fax #    Pia CARBAJAL Ifeanyi Stafford [phone]      Your next 10 appointments already scheduled     May 30, 2018  9:30 AM CDT   SHORT with Betty Holliday MD   [facility] ([facility])    [address]  [place] [id number] [phone]              Further instructions from your care team       [facility]   Adult Discharge Orders & Instructions     For 24 hours after surgery    1. Get plenty of rest.  A responsible adult must stay with you for at least 24 hours after you leave the hospital.   2. Do not drive or use heavy equipment.  If you have weakness or tingling, don't drive or use heavy equipment until this feeling goes away.  3. Do not drink alcohol.  4. Avoid strenuous or risky activities.  Ask for help when climbing stairs.   5. You may feel lightheaded.   "IF so, sit for a few minutes before standing.  Have someone help you get up.   6. If you have nausea (feel sick to your stomach): Drink only clear liquids such as apple juice, ginger ale, broth or 7-Up.  Rest may also help.  Be sure to drink enough fluids.  Move to a regular diet as you feel able.  7. You may have a slight fever. Call the doctor if your fever is over 101 F (38.3 C) (taken under the tongue) or lasts longer than 24 hours.  8. You may have a dry mouth, a sore throat, muscle aches or trouble sleeping.  These should go away after 24 hours.  9. Do not make important or legal decisions.   Call your doctor for any of the followin.  Signs of infection (fever, growing tenderness at the surgery site, a large amount of drainage or bleeding, severe pain, foul-smelling drainage, redness, swelling).    2. It has been over 8 to 10 hours since surgery and you are still not able to urinate (pass water).    3.  Headache for over 24 hours.    4.  Numbness, tingling or weakness the day after surgery (if you had spinal anesthesia).  To contact a doctor, call    [phone]___________________________    Pending Results     Date and Time Order Name Status Description    2018 1145 Surgical pathology exam In process             Admission Information     Date & Time Provider Department Dept. Phone    2018 Wily Mars MD [facility] [phone]      Your Vitals Were     Blood Pressure Temperature Respirations Weight Last Period Pulse Oximetry    106/68 (Cuff Size: Adult Regular) 97.9  F (36.6  C) 16 62.1 kg (137 lb) 2018 98%    BMI (Body Mass Index)                   22.45 kg/m2           [employer] Information     [employer] lets you send messages to your doctor, view your test results, renew your prescriptions, schedule appointments and more. To sign up, go to [email or web address] . Click on \"Log in\" on the left side of the screen, which will take you to the Welcome page. Then " "click on \"Sign up Now\" on the right side of the page.     You will be asked to enter the access code listed below, as well as some personal information. Please follow the directions to create your username and password.     Your access code is: [email or web address]  Expires: 2018  2:00 PM     Your access code will  in 90 days. If you need help or a new code, please call your [facility] clinic or [phone].        Care EveryWhere ID     This is your Care EveryWhere ID. This could be used by other organizations to access your [facility] medical records  [id number]        Equal Access to Services     [facility]: Hadnoe Quiñones, michael chan, brian cruz, dank rubio. So [facility] [phone].    ATENCIÓN: Si habla español, tiene a sagastume disposición servicios gratuitos de asistencia lingüística. Llame al [phone].    We comply with applicable federal civil rights laws and [place] laws. We do not discriminate on the basis of race, color, national origin, age, disability, sex, sexual orientation, or gender identity.               Review of your medicines      Notice     You have not been prescribed any medications.             Protect others around you: Learn how to safely use, store and throw away your medicines at www.disposemymeds.org.             Medication List: This is a list of all your medications and when to take them. Check marks below indicate your daily home schedule. Keep this list as a reference.      Notice     You have not been prescribed any medications.      "

## 2018-05-16 NOTE — ANESTHESIA POSTPROCEDURE EVALUATION
Patient: Jenny Hadley    Procedure(s):  Suction Dilation & Curettage - Wound Class: II-Clean Contaminated    Diagnosis:molar pregnancy  Diagnosis Additional Information: No value filed.    Anesthesia Type:  MAC    Note:  Anesthesia Post Evaluation    Patient location during evaluation: Phase 2  Patient participation: Able to fully participate in evaluation  Level of consciousness: awake and alert  Pain management: adequate  Airway patency: patent  Cardiovascular status: acceptable  Respiratory status: acceptable  Hydration status: acceptable  PONV: none             Last vitals:  Vitals:    05/16/18 0850 05/16/18 1200   BP: 106/68    Resp: 16 16   Temp: 97  F (36.1  C) 97.9  F (36.6  C)   SpO2: 98%          Electronically Signed By: ANDREA GOETZ CRNA  May 16, 2018  12:45 PM

## 2018-05-16 NOTE — ANESTHESIA PREPROCEDURE EVALUATION
Anesthesia Evaluation     . Pt has had prior anesthetic.            ROS/MED HX    ENT/Pulmonary:  - neg pulmonary ROS     Neurologic:  - neg neurologic ROS     Cardiovascular:  - neg cardiovascular ROS       METS/Exercise Tolerance:  >4 METS   Hematologic:  - neg hematologic  ROS       Musculoskeletal:  - neg musculoskeletal ROS       GI/Hepatic: Comment: Takes tums with relief. - neg GI/hepatic ROS   (+) GERD Other,       Renal/Genitourinary:  - ROS Renal section negative       Endo:  - neg endo ROS       Psychiatric:  - neg psychiatric ROS       Infectious Disease:  - neg infectious disease ROS       Malignancy:      - no malignancy   Other:    - neg other ROS                 Physical Exam  Normal systems: dental    Airway   Mallampati: I  TM distance: >3 FB  Neck ROM: full    Dental     Cardiovascular   Rhythm and rate: regular and normal      Pulmonary    breath sounds clear to auscultation                    Anesthesia Plan      History & Physical Review      ASA Status:  1 .    NPO Status:  > 4 hours    Plan for MAC with Propofol induction. Maintenance will be TIVA.      Mac but may proceed to GA LMA if needed      Postoperative Care      Consents  Anesthetic plan, risks, benefits and alternatives discussed with:  Patient and Spouse..                          .

## 2018-05-16 NOTE — H&P (VIEW-ONLY)
Nursing Notes:   Jalyn Bullard LPN  2018 11:11 AM  Signed  Patient presents to the clinic today for a follow up.    Jalyn Bullard LPN.................. 2018 11:04 AM       SUBJECTIVE:   CC:  Jenny Hadley is a 27 year old female who presents to clinic today for the following health issues: Follow-up of abnormal ultrasound and increasing hCG levels    HPI  Jenny Hadley is a 27 year old female presents for follow-up of abnormal obstetrical ultrasound and increasing hCG levels.  When seen last week, her hCG level had increased, was above 240,000.  She is having lower abdomen tenderness.  An ultrasound was obtained.  There is no evidence of ectopic pregnancy.  However, the echogenic pattern within the uterus was abnormal, molar pregnancy was considered.  The case was discussed with Dr. Gilbert, who recommended close follow-up in consultation.  This weekend, she was having some upper back pain, went to the emergency department for this.  That has improved.  Also, after her visit on Friday, she did have some increased vaginal bleeding, this lasted for a day.    She has been tired.  No headaches.  Moderate nausea.    A positive blood type     Allergies   Allergen Reactions     Cats Itching and Shortness Of Breath     Current Outpatient Prescriptions   Medication     Prenatal Vit-Fe Fumarate-FA (PRENATAL MULTIVITAMIN PLUS IRON) 27-0.8 MG TABS per tablet     No current facility-administered medications for this visit.       Patient Active Problem List    Diagnosis Date Noted     History of prior pregnancies 2018     Priority: Medium            Encounter for prenatal care in third trimester of first pregnancy 2016     Priority: Medium     Past Surgical History:   Procedure Laterality Date     DENTAL SURGERY      wisdom teeth     Family History   Problem Relation Age of Onset     Breast Cancer Paternal Grandmother      Cancer-breast     Breast Cancer Paternal Aunt       "Cancer-breast       Review of Systems     PHQ-2 Score:     PHQ-2 ( 1999 [employer]) 5/14/2018 5/11/2018   Q1: Little interest or pleasure in doing things 0 0   Q2: Feeling down, depressed or hopeless 0 0   PHQ-2 Score 0 0         PHQ-9 SCORE 2/24/2016   Total Score 7         OBJECTIVE:     BP 94/60 (BP Location: Right arm, Patient Position: Sitting, Cuff Size: Adult Regular)  Pulse 84  Ht 5' 5.5\" (1.664 m)  Wt 139 lb (63 kg)  LMP 05/02/2018  Breastfeeding? No  BMI 22.78 kg/m2  Body mass index is 22.78 kg/(m^2).  Physical Exam   Constitutional: She appears well-developed and well-nourished.   Cardiovascular: Normal rate and regular rhythm.    Pulmonary/Chest: Breath sounds normal.   Nursing note and vitals reviewed.       Results for orders placed or performed in visit on 05/14/18   HCG quantitative pregnancy   Result Value Ref Range    HCG Quantitative Serum 302978 IU/L   **CBC W Plt No Diff FUTURE anytime   Result Value Ref Range    WBC 6.2 4.0 - 11.0 10e9/L    RBC Count 4.20 3.8 - 5.2 10e12/L    Hemoglobin 12.3 11.7 - 15.7 g/dL    Hematocrit 34.9 (L) 35.0 - 47.0 %    MCV 83 78 - 100 fl    MCH 29.3 26.5 - 33.0 pg    MCHC 35.2 31.5 - 36.5 g/dL    RDW 11.7 10.0 - 15.0 %    Platelet Count 289 150 - 450 10e9/L         ASSESSMENT/PLAN:       ICD-10-CM    1. Molar pregnancy O02.0             PLAN:  1.  Ultrasound report and images were reviewed with Dr. Mars.  He will see the patient today in consultation.  Diagnosis of molar pregnancy was discussed with the patient today.  We discussed that definitive care would be surgical.  Follow-up testing and monitoring, in particular based on pathology, discussed.  Patient has an OB/GYN appointment directly following this visit.      GEMMA BAILEY MD  [facility]    "

## 2018-05-16 NOTE — ANESTHESIA CARE TRANSFER NOTE
Patient: Jenny Hadley    Procedure(s):  Suction Dilation & Curettage - Wound Class: II-Clean Contaminated    Diagnosis: molar pregnancy  Diagnosis Additional Information: No value filed.    Anesthesia Type:   MAC     Note:  Airway :Face Mask  Patient transferred to:Phase II  Handoff Report: Identifed the Patient, Identified the Reponsible Provider, Reviewed the pertinent medical history, Discussed the surgical course, Reviewed Intra-OP anesthesia mangement and issues during anesthesia, Set expectations for post-procedure period and Allowed opportunity for questions and acknowledgement of understanding      Vitals: (Last set prior to Anesthesia Care Transfer)    CRNA VITALS  5/16/2018 1122 - 5/16/2018 1157      5/16/2018             Resp Rate (set): 10                Electronically Signed By: ANDREA SHARP CRNA  May 16, 2018  11:57 AM

## 2018-05-16 NOTE — DISCHARGE INSTRUCTIONS
[facility]   Adult Discharge Orders & Instructions     For 24 hours after surgery    1. Get plenty of rest.  A responsible adult must stay with you for at least 24 hours after you leave the hospital.   2. Do not drive or use heavy equipment.  If you have weakness or tingling, don't drive or use heavy equipment until this feeling goes away.  3. Do not drink alcohol.  4. Avoid strenuous or risky activities.  Ask for help when climbing stairs.   5. You may feel lightheaded.  IF so, sit for a few minutes before standing.  Have someone help you get up.   6. If you have nausea (feel sick to your stomach): Drink only clear liquids such as apple juice, ginger ale, broth or 7-Up.  Rest may also help.  Be sure to drink enough fluids.  Move to a regular diet as you feel able.  7. You may have a slight fever. Call the doctor if your fever is over 101 F (38.3 C) (taken under the tongue) or lasts longer than 24 hours.  8. You may have a dry mouth, a sore throat, muscle aches or trouble sleeping.  These should go away after 24 hours.  9. Do not make important or legal decisions.   Call your doctor for any of the followin.  Signs of infection (fever, growing tenderness at the surgery site, a large amount of drainage or bleeding, severe pain, foul-smelling drainage, redness, swelling).    2. It has been over 8 to 10 hours since surgery and you are still not able to urinate (pass water).    3.  Headache for over 24 hours.    4.  Numbness, tingling or weakness the day after surgery (if you had spinal anesthesia).  To contact a doctor, call    [phone]___________________________

## 2018-05-21 DIAGNOSIS — O02.0 MOLAR PREGNANCY: Primary | ICD-10-CM

## 2018-05-21 NOTE — PROGRESS NOTES
QUANT HCG was ordered for this patient due to their recent appointment in the OB/GYN department. All orders were clarified with provider prior to ordering.    Neelima De La Rosa RN.....................5/21/2018 11:37 AM

## 2018-05-23 ENCOUNTER — TELEPHONE (OUTPATIENT)
Dept: FAMILY MEDICINE | Facility: OTHER | Age: 28
End: 2018-05-23

## 2018-05-23 DIAGNOSIS — O02.0 MOLAR PREGNANCY: Primary | ICD-10-CM

## 2018-05-23 NOTE — TELEPHONE ENCOUNTER
[facility]-Pt requesting orders for lab-Hormone levels. Please call to let her know so she can schedule appt. Thank You.  Mayra Ho

## 2018-05-24 ENCOUNTER — TELEPHONE (OUTPATIENT)
Dept: OBGYN | Facility: OTHER | Age: 28
End: 2018-05-24

## 2018-05-24 DIAGNOSIS — O02.0 MOLAR PREGNANCY: ICD-10-CM

## 2018-05-24 LAB — B-HCG SERPL-ACNC: 3538 IU/L

## 2018-05-24 PROCEDURE — 84702 CHORIONIC GONADOTROPIN TEST: CPT | Performed by: OBSTETRICS & GYNECOLOGY

## 2018-05-24 PROCEDURE — 36415 COLL VENOUS BLD VENIPUNCTURE: CPT | Performed by: OBSTETRICS & GYNECOLOGY

## 2018-05-30 ENCOUNTER — OFFICE VISIT (OUTPATIENT)
Dept: OBGYN | Facility: OTHER | Age: 28
End: 2018-05-30
Attending: OBSTETRICS & GYNECOLOGY
Payer: COMMERCIAL

## 2018-05-30 VITALS
BODY MASS INDEX: 22.67 KG/M2 | SYSTOLIC BLOOD PRESSURE: 108 MMHG | HEART RATE: 88 BPM | WEIGHT: 138.31 LBS | DIASTOLIC BLOOD PRESSURE: 66 MMHG | TEMPERATURE: 98.5 F

## 2018-05-30 DIAGNOSIS — O02.0 MOLAR PREGNANCY: Primary | ICD-10-CM

## 2018-05-30 LAB — B-HCG SERPL-ACNC: 1020 IU/L

## 2018-05-30 PROCEDURE — 99213 OFFICE O/P EST LOW 20 MIN: CPT | Performed by: OBSTETRICS & GYNECOLOGY

## 2018-05-30 PROCEDURE — 36415 COLL VENOUS BLD VENIPUNCTURE: CPT | Performed by: FAMILY MEDICINE

## 2018-05-30 PROCEDURE — 84702 CHORIONIC GONADOTROPIN TEST: CPT | Performed by: FAMILY MEDICINE

## 2018-05-30 ASSESSMENT — PAIN SCALES - GENERAL: PAINLEVEL: NO PAIN (0)

## 2018-05-30 NOTE — LETTER
2018       RE: Jenny Hadley  [address]  [place] [address]     Dear Colleague,    Thank you for referring your patient, Jenny Hadley, to the [facility]. Please see a copy of my visit note below.    Follow-Up Visit    S: Ms. Jenny Hadley is a 27 year old  here for D&C follow up. Final pathology revealed a complete molar pregnancy. Since surgery, she has been doing well. She had a few clots right after surgery but is subsequently only having light spotting. She has discussed her contraceptive plan with her  and they plan on abstinence until cleared to try for pregnancy. She did not like being on birth control previously.     O:  /66 (BP Location: Right arm, Patient Position: Sitting, Cuff Size: Adult Regular)  Pulse 88  Temp 98.5  F (36.9  C) (Oral)  Wt 62.7 kg (138 lb 5 oz)  LMP 2018  Breastfeeding? Unknown  BMI 22.67 kg/m2  Gen: Well-appearing, NAD  Pulm: nonlabored  Psych: appropriate mood and affect    A/P:  Ms. Jenny Hadley is a 27 year old  here for f/u of D&C for molar pregnancy. Discussed the need for weekly beta-HCGs until negative. Reviewed the importance of avoiding pregnancy for at least the next 6-12 months. Recommend f/u in 4 weeks with Dr Mars.     Betty Holliday MD  OB/GYN  2018 10:03 AM

## 2018-05-30 NOTE — NURSING NOTE
Patient presents today as a 2 week post-op from a D&C due to a missed .  Marlene Hewitt LPN  2018  9:38 AM

## 2018-05-30 NOTE — MR AVS SNAPSHOT
After Visit Summary   5/30/2018    Jenny Hadley    MRN: [id number]           Patient Information     Date Of Birth          1990        Visit Information        Provider Department      5/30/2018 9:30 AM Betty Holliday MD [facility]        Today's Diagnoses     Molar pregnancy    -  1       Follow-ups after your visit        Your next 10 appointments already scheduled     Jun 06, 2018  7:10 AM CDT   LAB with GH LAB   [facility]    [address] [facility] [address]   [phone]           Please do not eat 10-12 hours before your appointment if you are coming in fasting for labs on lipids, cholesterol, or glucose (sugar). This does not apply to pregnant women. Water, hot tea and black coffee (with nothing added) are okay. Do not drink other fluids, diet soda or chew gum.            Jun 13, 2018  7:00 AM CDT   LAB with GH LAB   [facility]    [address] [facility] [address]   [phone]           Please do not eat 10-12 hours before your appointment if you are coming in fasting for labs on lipids, cholesterol, or glucose (sugar). This does not apply to pregnant women. Water, hot tea and black coffee (with nothing added) are okay. Do not drink other fluids, diet soda or chew gum.            Jun 20, 2018  7:00 AM CDT   LAB with GH LAB   [facility]    [address] [facility] [address]   [phone]           Please do not eat 10-12 hours before your appointment if you are coming in fasting for labs on lipids, cholesterol, or glucose (sugar). This does not apply to pregnant women. Water, hot tea and black coffee (with nothing added) are okay. Do not drink other fluids, diet soda or chew gum.            Jun 28, 2018  1:45 PM CDT   GINETTE with Wily LÓPEZ  MD Madisyn   [facility]    [address]  [place] [address]   [phone]            Jul 05, 2018  7:00 AM CDT   LAB with GH LAB   [facility]    [address]  [place] [address]   [phone]           Please do not eat 10-12 hours before your appointment if you are coming in fasting for labs on lipids, cholesterol, or glucose (sugar). This does not apply to pregnant women. Water, hot tea and black coffee (with nothing added) are okay. Do not drink other fluids, diet soda or chew gum.            Jul 12, 2018  7:00 AM CDT   LAB with GH LAB   [facility]    [address]  [place] [address]   [phone]           Please do not eat 10-12 hours before your appointment if you are coming in fasting for labs on lipids, cholesterol, or glucose (sugar). This does not apply to pregnant women. Water, hot tea and black coffee (with nothing added) are okay. Do not drink other fluids, diet soda or chew gum.              Future tests that were ordered for you today     Open Standing Orders        Priority Remaining Interval Expires Ordered    HCG quantitative pregnancy Routine 10/10 weekly 5/30/2019 5/30/2018            Who to contact     If you have questions or need follow up information about today's clinic visit or your schedule please contact [facility] directly at [phone].  Normal or non-critical lab and imaging results will be communicated to you by MyChart, letter or phone within 4 business days after the clinic has received the results. If you do not hear from us within 7 days, please contact the clinic through MyChart or phone. If you have a critical or abnormal lab result, we will notify you by phone as soon as possible.  Submit refill requests through Power Africahart or call your  "pharmacy and they will forward the refill request to us. Please allow 3 business days for your refill to be completed.          Additional Information About Your Visit        MyChart Information     MIKESTARharDigitalsmiths lets you send messages to your doctor, view your test results, renew your prescriptions, schedule appointments and more. To sign up, go to [email or web address] . Click on \"Log in\" on the left side of the screen, which will take you to the Welcome page. Then click on \"Sign up Now\" on the right side of the page.     You will be asked to enter the access code listed below, as well as some personal information. Please follow the directions to create your username and password.     Your access code is: [email or web address]  Expires: 2018  2:00 PM     Your access code will  in 90 days. If you need help or a new code, please call your [place] clinic or [phone].        Care EveryWhere ID     This is your Care EveryWhere ID. This could be used by other organizations to access your [place] medical records  [id number]        Your Vitals Were     Pulse Temperature Last Period Breastfeeding? BMI (Body Mass Index)       88 98.5  F (36.9  C) (Oral) 2018 Unknown 22.67 kg/m2        Blood Pressure from Last 3 Encounters:   18 108/66   18 100/57   18 96/62    Weight from Last 3 Encounters:   18 62.7 kg (138 lb 5 oz)   18 62.1 kg (137 lb)   18 62.5 kg (137 lb 12.8 oz)              We Performed the Following     HCG quantitative pregnancy        Primary Care Provider Office Phone # Fax #    Pia Stafford [phone] [address] [facility] [address]        Equal Access to Services     [facility] : Satish Quiñones, wastacia chan, qaybta kaalmadeepa cruz, dank rubio. So [facility] [phone].    ATENCIÓN: Si habla español, tiene a sagastume disposición servicios gratuitos de asistencia " lingüísticaNelson Alvarez al [phone].    We comply with applicable federal civil rights laws and [place] laws. We do not discriminate on the basis of race, color, national origin, age, disability, sex, sexual orientation, or gender identity.            Thank you!     Thank you for choosing [facility] AND [facility]  for your care. Our goal is always to provide you with excellent care. Hearing back from our patients is one way we can continue to improve our services. Please take a few minutes to complete the written survey that you may receive in the mail after your visit with us. Thank you!             Your Updated Medication List - Protect others around you: Learn how to safely use, store and throw away your medicines at www.disposemymeds.org.      Notice  As of 5/30/2018 10:32 AM    You have not been prescribed any medications.

## 2018-05-30 NOTE — PROGRESS NOTES
Follow-Up Visit    S: Ms. Jenny Hadley is a 27 year old  here for D&C follow up. Final pathology revealed a complete molar pregnancy. Since surgery, she has been doing well. She had a few clots right after surgery but is subsequently only having light spotting. She has discussed her contraceptive plan with her  and they plan on abstinence until cleared to try for pregnancy. She did not like being on birth control previously.     O:  /66 (BP Location: Right arm, Patient Position: Sitting, Cuff Size: Adult Regular)  Pulse 88  Temp 98.5  F (36.9  C) (Oral)  Wt 62.7 kg (138 lb 5 oz)  LMP 2018  Breastfeeding? Unknown  BMI 22.67 kg/m2  Gen: Well-appearing, NAD  Pulm: nonlabored  Psych: appropriate mood and affect    A/P:  Ms. Jenny Hadley is a 27 year old  here for f/u of D&C for molar pregnancy. Discussed the need for weekly beta-HCGs until negative. Reviewed the importance of avoiding pregnancy for at least the next 6-12 months. Recommend f/u in 4 weeks with Dr Mars.     Betty Holliday MD  OB/GYN  2018 10:03 AM

## 2018-06-06 ENCOUNTER — APPOINTMENT (OUTPATIENT)
Dept: LAB | Facility: OTHER | Age: 28
End: 2018-06-06
Attending: OBSTETRICS & GYNECOLOGY
Payer: COMMERCIAL

## 2018-06-06 DIAGNOSIS — O02.0 MOLAR PREGNANCY: ICD-10-CM

## 2018-06-06 LAB — B-HCG SERPL-ACNC: 256 IU/L

## 2018-06-06 PROCEDURE — 84702 CHORIONIC GONADOTROPIN TEST: CPT | Performed by: FAMILY MEDICINE

## 2018-06-06 PROCEDURE — 36415 COLL VENOUS BLD VENIPUNCTURE: CPT | Performed by: FAMILY MEDICINE

## 2018-06-13 DIAGNOSIS — O02.0 MOLAR PREGNANCY: ICD-10-CM

## 2018-06-13 DIAGNOSIS — O02.0 MOLAR PREGNANCY: Primary | ICD-10-CM

## 2018-06-13 LAB — B-HCG SERPL-ACNC: 61 IU/L

## 2018-06-13 PROCEDURE — 84702 CHORIONIC GONADOTROPIN TEST: CPT | Performed by: OBSTETRICS & GYNECOLOGY

## 2018-06-13 PROCEDURE — 36415 COLL VENOUS BLD VENIPUNCTURE: CPT | Performed by: OBSTETRICS & GYNECOLOGY

## 2018-06-20 DIAGNOSIS — O02.0 MOLAR PREGNANCY: ICD-10-CM

## 2018-06-20 LAB — B-HCG SERPL-ACNC: 20 IU/L

## 2018-06-20 PROCEDURE — 84702 CHORIONIC GONADOTROPIN TEST: CPT | Performed by: OBSTETRICS & GYNECOLOGY

## 2018-06-20 PROCEDURE — 36415 COLL VENOUS BLD VENIPUNCTURE: CPT | Performed by: OBSTETRICS & GYNECOLOGY

## 2018-06-28 ENCOUNTER — OFFICE VISIT (OUTPATIENT)
Dept: OBGYN | Facility: OTHER | Age: 28
End: 2018-06-28
Attending: OBSTETRICS & GYNECOLOGY
Payer: COMMERCIAL

## 2018-06-28 VITALS
HEART RATE: 76 BPM | BODY MASS INDEX: 23.04 KG/M2 | SYSTOLIC BLOOD PRESSURE: 102 MMHG | DIASTOLIC BLOOD PRESSURE: 60 MMHG | WEIGHT: 140.6 LBS

## 2018-06-28 DIAGNOSIS — O02.0 MOLAR PREGNANCY: Primary | ICD-10-CM

## 2018-06-28 PROBLEM — Z78.9 HISTORY OF PRIOR PREGNANCIES: Status: RESOLVED | Noted: 2018-01-01 | Resolved: 2018-06-28

## 2018-06-28 PROCEDURE — 99213 OFFICE O/P EST LOW 20 MIN: CPT | Performed by: OBSTETRICS & GYNECOLOGY

## 2018-06-28 ASSESSMENT — PAIN SCALES - GENERAL: PAINLEVEL: NO PAIN (0)

## 2018-06-28 NOTE — MR AVS SNAPSHOT
After Visit Summary   6/28/2018    Jenny Hadley    MRN: [id number]           Patient Information     Date Of Birth          1990        Visit Information        Provider Department      6/28/2018 1:45 PM Wily Mars MD [facility]        Today's Diagnoses     Molar pregnancy    -  1       Follow-ups after your visit        Your next 10 appointments already scheduled     Jul 05, 2018  7:00 AM CDT   LAB with GH LAB   [facility]    [address] [facility] [address]   [phone]           Please do not eat 10-12 hours before your appointment if you are coming in fasting for labs on lipids, cholesterol, or glucose (sugar). This does not apply to pregnant women. Water, hot tea and black coffee (with nothing added) are okay. Do not drink other fluids, diet soda or chew gum.            Jul 12, 2018  7:00 AM CDT   LAB with GH LAB   [facility]    [address] [facility] [address]   [phone]           Please do not eat 10-12 hours before your appointment if you are coming in fasting for labs on lipids, cholesterol, or glucose (sugar). This does not apply to pregnant women. Water, hot tea and black coffee (with nothing added) are okay. Do not drink other fluids, diet soda or chew gum.              Who to contact     If you have questions or need follow up information about today's clinic visit or your schedule please contact [facility] directly at [phone].  Normal or non-critical lab and imaging results will be communicated to you by MyChart, letter or phone within 4 business days after the clinic has received the results. If you do not hear from us within 7 days, please contact the clinic through MyChart or phone. If you have a critical or abnormal lab result, we will notify you by phone as soon as  "possible.  Submit refill requests through FlexyMind or call your pharmacy and they will forward the refill request to us. Please allow 3 business days for your refill to be completed.          Additional Information About Your Visit        [facility] Interactive Information     FlexyMind lets you send messages to your doctor, view your test results, renew your prescriptions, schedule appointments and more. To sign up, go to [email or web address] . Click on \"Log in\" on the left side of the screen, which will take you to the Welcome page. Then click on \"Sign up Now\" on the right side of the page.     You will be asked to enter the access code listed below, as well as some personal information. Please follow the directions to create your username and password.     Your access code is: [email or web address]  Expires: 2018  1:52 PM     Your access code will  in 90 days. If you need help or a new code, please call your [place] clinic or [phone].        Care EveryWhere ID     This is your Care EveryWhere ID. This could be used by other organizations to access your [place] medical records  [id number]        Your Vitals Were     Pulse Last Period Breastfeeding? BMI (Body Mass Index)          76 2018 No 23.04 kg/m2         Blood Pressure from Last 3 Encounters:   18 102/60   18 108/66   18 100/57    Weight from Last 3 Encounters:   18 63.8 kg (140 lb 9.6 oz)   18 62.7 kg (138 lb 5 oz)   18 62.1 kg (137 lb)              Today, you had the following     No orders found for display       Primary Care Provider Office Phone # Fax #    Pia RAVEN Stafford [phone] [address] [facility] [address]        Equal Access to Services     [facility] FRANCES : Satish Quiñones, washawnada dawnqjs, qaybta kaalmadeepa cruz, dank rubio. So [facility] [phone].    ATENCIÓN: Si habla fe, tiene a sagastume disposición servicios " emir de asistencia lingüística. Kim salcedo [phone].    We comply with applicable federal civil rights laws and [place] laws. We do not discriminate on the basis of race, color, national origin, age, disability, sex, sexual orientation, or gender identity.            Thank you!     Thank you for choosing [facility] AND [facility]  for your care. Our goal is always to provide you with excellent care. Hearing back from our patients is one way we can continue to improve our services. Please take a few minutes to complete the written survey that you may receive in the mail after your visit with us. Thank you!             Your Updated Medication List - Protect others around you: Learn how to safely use, store and throw away your medicines at www.disposemymeds.org.      Notice  As of 6/28/2018  2:39 PM    You have not been prescribed any medications.

## 2018-06-28 NOTE — PROGRESS NOTES
Jenny Hadley presents at 6 weeks after suction curettage for molar pregnancy.    S: She feels well, no bleeding. She has had a period now.      No current outpatient prescriptions on file.     No current facility-administered medications for this visit.      Allergies   Allergen Reactions     Cats Itching and Shortness Of Breath     Past Medical History:   Diagnosis Date     History of prior pregnancies          Molar pregnancy 2018     Past Surgical History:   Procedure Laterality Date     DENTAL SURGERY      wisdom teeth     DILATION AND CURETTAGE SUCTION N/A 2018    Procedure: DILATION AND CURETTAGE SUCTION;  Suction Dilation & Curettage;  Surgeon: Wily Mars MD;  Location:  OR       COMPLETE REVIEW OF SYSTEMS: neg        O: /60 (BP Location: Right arm)  Pulse 76  Wt 63.8 kg (140 lb 9.6 oz)  LMP 2018  Breastfeeding? No  BMI 23.04 kg/m2 Body mass index is 23.04 kg/(m^2).    EXAM:  NAD    Results for orders placed or performed in visit on 18   HCG quantitative pregnancy   Result Value Ref Range    HCG Quantitative Serum 20 IU/L         I/P: molar pregnancy.  hcg levels until zero.  Wait to conceive for six months    Wily Mars MD FACOG  2:31 PM 2018

## 2018-06-28 NOTE — LETTER
2018       RE: Jenny Hadley  [address]  [facility] [address]     Dear Colleague,    Thank you for referring your patient, Jenny Hadley, to the [facility]. Please see a copy of my visit note below.    Jenny Hadley presents at 6 weeks after suction curettage for molar pregnancy.    S: She feels well, no bleeding. She has had a period now.  No current outpatient prescriptions on file.     No current facility-administered medications for this visit.      Allergies   Allergen Reactions     Cats Itching and Shortness Of Breath     Past Medical History:   Diagnosis Date     History of prior pregnancies          Molar pregnancy 2018     Past Surgical History:   Procedure Laterality Date     DENTAL SURGERY      wisdom teeth     DILATION AND CURETTAGE SUCTION N/A 2018    Procedure: DILATION AND CURETTAGE SUCTION;  Suction Dilation & Curettage;  Surgeon: Wily Mars MD;  Location:  OR       COMPLETE REVIEW OF SYSTEMS: neg    O: /60 (BP Location: Right arm)  Pulse 76  Wt 63.8 kg (140 lb 9.6 oz)  LMP 2018  Breastfeeding? No  BMI 23.04 kg/m2 Body mass index is 23.04 kg/(m^2).    EXAM:  NAD    Results for orders placed or performed in visit on 18   HCG quantitative pregnancy   Result Value Ref Range    HCG Quantitative Serum 20 IU/L     I/P: molar pregnancy.  hcg levels until zero.  Wait to conceive for six months    Wily Mars MD FACOG  2:31 PM

## 2018-07-12 ENCOUNTER — APPOINTMENT (OUTPATIENT)
Dept: LAB | Facility: OTHER | Age: 28
End: 2018-07-12
Attending: OBSTETRICS & GYNECOLOGY
Payer: COMMERCIAL

## 2018-07-12 DIAGNOSIS — O02.0 MOLAR PREGNANCY: ICD-10-CM

## 2018-07-12 LAB — B-HCG SERPL-ACNC: 2 IU/L

## 2018-07-12 PROCEDURE — 84702 CHORIONIC GONADOTROPIN TEST: CPT | Performed by: OBSTETRICS & GYNECOLOGY

## 2018-07-12 PROCEDURE — 36415 COLL VENOUS BLD VENIPUNCTURE: CPT | Performed by: OBSTETRICS & GYNECOLOGY

## 2018-08-24 ENCOUNTER — OFFICE VISIT (OUTPATIENT)
Dept: FAMILY MEDICINE | Facility: OTHER | Age: 28
End: 2018-08-24
Attending: NURSE PRACTITIONER
Payer: COMMERCIAL

## 2018-08-24 VITALS — BODY MASS INDEX: 23.6 KG/M2 | WEIGHT: 144 LBS | TEMPERATURE: 98.6 F

## 2018-08-24 DIAGNOSIS — S01.511A LACERATION OF LIP WITHOUT COMPLICATION, INITIAL ENCOUNTER: Primary | ICD-10-CM

## 2018-08-24 PROCEDURE — 12011 RPR F/E/E/N/L/M 2.5 CM/<: CPT | Performed by: NURSE PRACTITIONER

## 2018-08-24 ASSESSMENT — ANXIETY QUESTIONNAIRES
6. BECOMING EASILY ANNOYED OR IRRITABLE: NOT AT ALL
5. BEING SO RESTLESS THAT IT IS HARD TO SIT STILL: NOT AT ALL
1. FEELING NERVOUS, ANXIOUS, OR ON EDGE: NOT AT ALL
2. NOT BEING ABLE TO STOP OR CONTROL WORRYING: NOT AT ALL
7. FEELING AFRAID AS IF SOMETHING AWFUL MIGHT HAPPEN: NOT AT ALL
IF YOU CHECKED OFF ANY PROBLEMS ON THIS QUESTIONNAIRE, HOW DIFFICULT HAVE THESE PROBLEMS MADE IT FOR YOU TO DO YOUR WORK, TAKE CARE OF THINGS AT HOME, OR GET ALONG WITH OTHER PEOPLE: NOT DIFFICULT AT ALL
GAD7 TOTAL SCORE: 0
3. WORRYING TOO MUCH ABOUT DIFFERENT THINGS: NOT AT ALL

## 2018-08-24 ASSESSMENT — PATIENT HEALTH QUESTIONNAIRE - PHQ9: 5. POOR APPETITE OR OVEREATING: NOT AT ALL

## 2018-08-24 ASSESSMENT — PAIN SCALES - GENERAL: PAINLEVEL: MODERATE PAIN (4)

## 2018-08-24 NOTE — PROGRESS NOTES
Nursing Notes:   Guera Edmonds LPN  8/24/2018  3:54 PM  Signed  Cut her top lip by shutting the trunk of the car door  Guera Edmonds LPN on 8/24/2018 at 2:44 PM    SUBJECTIVE:   27 year old female sustained laceration of upper lip 1 hours ago. Nature of injury: Was shutting car trunk and hit lip. Tetanus vaccination status reviewed: last tetanus booster within 10 years, tetanus re-vaccination not indicated.       Allergies   Allergen Reactions     Cats Itching and Shortness Of Breath     No current outpatient prescriptions on file.     No current facility-administered medications for this visit.        OBJECTIVE:   Patient appears well, vitals are normal. Superficial Laceration 1 cm noted. L;aceration with moderate skin swelling.  Description: clean wound edges, no foreign bodies. No loose teeth, no chips in teeth. Upper lip with the laceration on the anterior surface, on the inside of the lip is an abrasion, but not cut all the way through. She is able to smile, PERR, Alert.     ASSESSMENT:   Laceration as described.    PLAN:    Wound care instructions provided.  Observe for any signs of infection or other problems.  Return for suture removal in 6 days.    Laceration repair  Date/Time: 8/24/2018 3:38 PM  Performed by: IRON PORTILLO  Authorized by: IRON PORTILLO   Body area: mouth  Location details: upper lip, interior  Laceration length: 1 cm  Foreign bodies: no foreign bodies  Nerve involvement: none  Vascular damage: no  Anesthesia: local infiltration    Anesthesia:  Local Anesthetic: lidocaine 1% without epinephrine  Anesthetic total: 1.5 mL    Sedation:  Patient sedated: no  Irrigation method: tap  Amount of cleaning: standard  Debridement: none  Degree of undermining: none  Skin closure: 6-0 nylon  Number of sutures: 5  Technique: simple  Approximation: close  Approximation difficulty: simple  Patient tolerance: Patient tolerated the procedure well with no immediate complications       Wound cares  gone over. F/U if needed.       Belkis Stafford NP on 8/24/2018 at 4:44 PM

## 2018-08-24 NOTE — MR AVS SNAPSHOT
After Visit Summary   8/24/2018    Jenny Hadley    MRN: [id number]           Patient Information     Date Of Birth          1990        Visit Information        Provider Department      8/24/2018 1:45 PM Belkis Stafford NP [facility]        Care Instructions      Face Laceration: Stitches or Tape  A laceration is a cut through the skin. This will require stitches if it is deep. Minor cuts may be treated with surgical tape.    Home care    Your healthcare provider may prescribe an antibiotic. This is to help prevent infection. Follow all instructions for taking this medicine. Take the medicine every day until it is gone or you are told to stop. You should not have any left over.    The healthcare provider may prescribe medicines for pain. Follow instructions for taking them.    Follow the healthcare provider s instructions on how to care for the cut.    Wash your hands with soap and warm water before and after caring for the cut. This helps prevent infection.    If a bandage was applied and it becomes wet or dirty, replace it. Otherwise, leave it in place for the first 24 hours, then change it once a day or as directed.    If stitches were used, clean the wound daily:  ? After removing the bandage, wash the area with soap and water. Use a wet cotton swab to loosen and remove any blood or crust that forms.  ? After cleaning, keep the wound clean and dry. Talk with your healthcare provider before putting any antibiotic ointment on the wound. Reapply a fresh bandage.  ? You may remove the bandage to shower as usual after the first 24 hours, but don't soak the area in water (no swimming) until the sutures are removed.    If surgical tape was used, keep the area clean and dry. If it becomes wet, blot it dry with a towel.    Most facial skin wounds heal without problems. But an infection sometimes occurs despite proper treatment. Watch for the signs of infection listed  below.  Follow-up care  Follow up with your healthcare provider as advised. Be sure to return for removal of the stitches as directed. Ask your provider how long stitches should remain in place. If surgical tape closures were used, you may remove them yourself when your provider recommends if they have not fallen off on their own.  When to seek medical advice  Call your healthcare provider right away if any of these occur:    Wound bleeding not controlled by direct pressure    Signs of infection, including increasing pain in the wound, increasing wound redness or swelling, or pus or bad odor coming from the wound    Fever of 100.4 F (38 C) or higher, or as directed by your healthcare provider    Stitches come apart or fall out or surgical tape falls off before 5 days    Wound edges reopen    Wound changes colors    Numbness around the wound     Sutures out in 6 days                Follow-ups after your visit        Follow-up notes from your care team     Return if symptoms worsen or fail to improve.      Who to contact     If you have questions or need follow up information about today's clinic visit or your schedule please contact [facility] AND [facility] directly at [phone].  Normal or non-critical lab and imaging results will be communicated to you by innRoadhart, letter or phone within 4 business days after the clinic has received the results. If you do not hear from us within 7 days, please contact the clinic through TradeSynct or phone. If you have a critical or abnormal lab result, we will notify you by phone as soon as possible.  Submit refill requests through WorkSnug or call your pharmacy and they will forward the refill request to us. Please allow 3 business days for your refill to be completed.          Additional Information About Your Visit        WorkSnug Information     WorkSnug lets you send messages to your doctor, view your test results, renew your prescriptions, schedule appointments and more.  "To sign up, go to [email or web address] . Click on \"Log in\" on the left side of the screen, which will take you to the Welcome page. Then click on \"Sign up Now\" on the right side of the page.     You will be asked to enter the access code listed below, as well as some personal information. Please follow the directions to create your username and password.     Your access code is: [email or web address]  Expires: 2018  1:52 PM     Your access code will  in 90 days. If you need help or a new code, please call your [place] clinic or [phone].        Care EveryWhere ID     This is your Care EveryWhere ID. This could be used by other organizations to access your [place] medical records  [id number]        Your Vitals Were     Temperature BMI (Body Mass Index)                98.6  F (37  C) (Tympanic) 23.6 kg/m2           Blood Pressure from Last 3 Encounters:   18 102/60   18 108/66   18 100/57    Weight from Last 3 Encounters:   18 144 lb (65.3 kg)   18 140 lb 9.6 oz (63.8 kg)   18 138 lb 5 oz (62.7 kg)              Today, you had the following     No orders found for display       Primary Care Provider Office Phone # Fax #    Pia CARBAJAL Ifeanyi Stafford [phone]       [address] [facility] [address]        Equal Access to Services     [facility] AH: Hadii aad ku hadasho Soomaali, waaxda luqadaha, qaybta kaalmada adeegyada, dank kemp . So [facility] [phone].    ATENCIÓN: Si habla español, tiene a sagastume disposición servicios gratuitos de asistencia lingüística. Llame al [phone].    We comply with applicable federal civil rights laws and [place] laws. We do not discriminate on the basis of race, color, national origin, age, disability, sex, sexual orientation, or gender identity.            Thank you!     Thank you for choosing [facility] AND [facility]  for your care. Our goal is always to provide you " with excellent care. Hearing back from our patients is one way we can continue to improve our services. Please take a few minutes to complete the written survey that you may receive in the mail after your visit with us. Thank you!             Your Updated Medication List - Protect others around you: Learn how to safely use, store and throw away your medicines at www.disposemymeds.org.      Notice  As of 8/24/2018  3:43 PM    You have not been prescribed any medications.

## 2018-08-24 NOTE — NURSING NOTE
Xylocaine 1% 5mL ordered by Belkis Stafford NP.  Medication [facility]   Lot # [id number]  Exp. 05/2021  NDC 70862-446-18  Used in lip laceration procedure. Patient tolerated well.  Judi FERNANDEZ CMA.......8/24/2018..4:51 PM...8/24/2018..4:51 PM      Prior to the start of the procedure and with procedural staff participation, I verbally confirmed the patient s identity using two indicators, relevant allergies, that the procedure was appropriate and matched the consent or emergent situation, and that the correct equipment/implants were available. Immediately prior to starting the procedure I conducted the Time Out with the procedural staff and re-confirmed the patient s name, procedure, and site/side. (The Joint Commission universal protocol was followed.)  Yes    Sedation (Moderate or Deep): None  Judi FERNANDEZ CMA.......8/24/2018..4:52 PM

## 2018-08-24 NOTE — NURSING NOTE
Cut her top lip by shutting the trunk of the car door  Guera Edmonds LPN on 8/24/2018 at 2:44 PM

## 2018-08-24 NOTE — PATIENT INSTRUCTIONS
Face Laceration: Stitches or Tape  A laceration is a cut through the skin. This will require stitches if it is deep. Minor cuts may be treated with surgical tape.    Home care    Your healthcare provider may prescribe an antibiotic. This is to help prevent infection. Follow all instructions for taking this medicine. Take the medicine every day until it is gone or you are told to stop. You should not have any left over.    The healthcare provider may prescribe medicines for pain. Follow instructions for taking them.    Follow the healthcare provider s instructions on how to care for the cut.    Wash your hands with soap and warm water before and after caring for the cut. This helps prevent infection.    If a bandage was applied and it becomes wet or dirty, replace it. Otherwise, leave it in place for the first 24 hours, then change it once a day or as directed.    If stitches were used, clean the wound daily:  ? After removing the bandage, wash the area with soap and water. Use a wet cotton swab to loosen and remove any blood or crust that forms.  ? After cleaning, keep the wound clean and dry. Talk with your healthcare provider before putting any antibiotic ointment on the wound. Reapply a fresh bandage.  ? You may remove the bandage to shower as usual after the first 24 hours, but don't soak the area in water (no swimming) until the sutures are removed.    If surgical tape was used, keep the area clean and dry. If it becomes wet, blot it dry with a towel.    Most facial skin wounds heal without problems. But an infection sometimes occurs despite proper treatment. Watch for the signs of infection listed below.  Follow-up care  Follow up with your healthcare provider as advised. Be sure to return for removal of the stitches as directed. Ask your provider how long stitches should remain in place. If surgical tape closures were used, you may remove them yourself when your provider recommends if they have not fallen  off on their own.  When to seek medical advice  Call your healthcare provider right away if any of these occur:    Wound bleeding not controlled by direct pressure    Signs of infection, including increasing pain in the wound, increasing wound redness or swelling, or pus or bad odor coming from the wound    Fever of 100.4 F (38 C) or higher, or as directed by your healthcare provider    Stitches come apart or fall out or surgical tape falls off before 5 days    Wound edges reopen    Wound changes colors    Numbness around the wound     Sutures out in 6 days

## 2018-08-25 ASSESSMENT — ANXIETY QUESTIONNAIRES: GAD7 TOTAL SCORE: 0

## 2018-08-30 ENCOUNTER — OFFICE VISIT (OUTPATIENT)
Dept: FAMILY MEDICINE | Facility: OTHER | Age: 28
End: 2018-08-30
Attending: NURSE PRACTITIONER
Payer: COMMERCIAL

## 2018-08-30 VITALS — TEMPERATURE: 98.5 F | HEART RATE: 80 BPM | RESPIRATION RATE: 16 BRPM | BODY MASS INDEX: 23.68 KG/M2 | WEIGHT: 144.5 LBS

## 2018-08-30 DIAGNOSIS — Z48.02 ENCOUNTER FOR REMOVAL OF SUTURES: Primary | ICD-10-CM

## 2018-08-30 PROCEDURE — 99207 ZZC NO CHARGE LOS: CPT | Performed by: NURSE PRACTITIONER

## 2018-08-30 ASSESSMENT — PAIN SCALES - GENERAL: PAINLEVEL: NO PAIN (0)

## 2018-08-30 NOTE — PROGRESS NOTES
Jenny Hadley presents to the clinic for removal of sutures. The patient has had sutures in place for 6 days. There has been no patient reported signs or symptoms of infection or drainage. 5  sutures are seen and located on the RT upper lip area of face. Tetanus status is up to date. All sutures were easily removed today. Routine wound care discussed by the RN or provider. The patient will follow up as needed.      Belkis Stafford NP on 8/30/2018 at 11:43 AM

## 2018-08-30 NOTE — NURSING NOTE
"Patient here for suture removal on upper lip. Patient has no concerns. Selma Brennan LPN .............8/30/2018  11:27 AM    Chief Complaint   Patient presents with     Wound Check       Initial Pulse 80  Temp 98.5  F (36.9  C) (Tympanic)  Resp 16  Wt 144 lb 8 oz (65.5 kg)  Breastfeeding? No  BMI 23.68 kg/m2 Estimated body mass index is 23.68 kg/(m^2) as calculated from the following:    Height as of 5/14/18: 5' 5.5\" (1.664 m).    Weight as of this encounter: 144 lb 8 oz (65.5 kg).  Medication Reconciliation: complete    Selma Brennan LPN  "

## 2018-08-30 NOTE — MR AVS SNAPSHOT
After Visit Summary   8/30/2018    Jenny Hadley    MRN: [id number]           Patient Information     Date Of Birth          1990        Visit Information        Provider Department      8/30/2018 11:15 AM Belkis Stafford NP [facility]        Today's Diagnoses     Encounter for removal of sutures    -  1       Follow-ups after your visit        Follow-up notes from your care team     Return if symptoms worsen or fail to improve.      Who to contact     If you have questions or need follow up information about today's clinic visit or your schedule please contact [facility] directly at [phone].  Normal or non-critical lab and imaging results will be communicated to you by MyChart, letter or phone within 4 business days after the clinic has received the results. If you do not hear from us within 7 days, please contact the clinic through MyChart or phone. If you have a critical or abnormal lab result, we will notify you by phone as soon as possible.  Submit refill requests through [email or web address] or call your pharmacy and they will forward the refill request to us. Please allow 3 business days for your refill to be completed.          Additional Information About Your Visit        Care EveryWhere ID     This is your Care EveryWhere ID. This could be used by other organizations to access your [facility] medical records  [id number]        Your Vitals Were     Pulse Temperature Respirations Breastfeeding? BMI (Body Mass Index)       80 98.5  F (36.9  C) (Tympanic) 16 No 23.68 kg/m2        Blood Pressure from Last 3 Encounters:   06/28/18 102/60   05/30/18 108/66   05/16/18 100/57    Weight from Last 3 Encounters:   08/30/18 144 lb 8 oz (65.5 kg)   08/24/18 144 lb (65.3 kg)   06/28/18 140 lb 9.6 oz (63.8 kg)              We Performed the Following     REMOVAL OF SUTURES        Primary Care Provider Office Phone # Fax #    Pia Stafford MD  [phone]       [address] [facility] [address]        Equal Access to Services     JESS DANIELS : Hadii davis Quiñones, michael campbellcataha, monakarma melchormadelainedeepa barretozekedeepa, waxmya mariamin hayaakelley mckeonrhonda feltonjannettebernice rubio. So [facility] [phone].    ATENCIÓN: Si habla español, tiene a sagastume disposición servicios gratuitos de asistencia lingüística. Llame al [phone].    We comply with applicable federal civil rights laws and [place] laws. We do not discriminate on the basis of race, color, national origin, age, disability, sex, sexual orientation, or gender identity.            Thank you!     Thank you for choosing [facility] AND [facility]  for your care. Our goal is always to provide you with excellent care. Hearing back from our patients is one way we can continue to improve our services. Please take a few minutes to complete the written survey that you may receive in the mail after your visit with us. Thank you!             Your Updated Medication List - Protect others around you: Learn how to safely use, store and throw away your medicines at www.disposemymeds.org.      Notice  As of 8/30/2018 11:48 AM    You have not been prescribed any medications.

## 2019-02-25 ENCOUNTER — OFFICE VISIT (OUTPATIENT)
Dept: FAMILY MEDICINE | Facility: OTHER | Age: 29
End: 2019-02-25
Attending: PHYSICIAN ASSISTANT
Payer: COMMERCIAL

## 2019-02-25 VITALS
TEMPERATURE: 97.9 F | WEIGHT: 152 LBS | BODY MASS INDEX: 24.91 KG/M2 | DIASTOLIC BLOOD PRESSURE: 70 MMHG | SYSTOLIC BLOOD PRESSURE: 100 MMHG | RESPIRATION RATE: 18 BRPM | HEART RATE: 80 BPM

## 2019-02-25 DIAGNOSIS — L60.9 NAIL ABNORMALITY: Primary | ICD-10-CM

## 2019-02-25 PROCEDURE — 11730 AVULSION NAIL PLATE SIMPLE 1: CPT | Mod: T5 | Performed by: PHYSICIAN ASSISTANT

## 2019-02-25 NOTE — NURSING NOTE
Chief Complaint   Patient presents with     Toenail         Medication Reconciliation: complete    Vivian Walsh, LPN

## 2019-02-25 NOTE — PROGRESS NOTES
Nursing Notes:   Vivian Walsh LPN  2019  2:29 PM  Signed  Chief Complaint   Patient presents with     Toenail         Medication Reconciliation: complete    Vivian Walsh LPN      HPI:    Jenny Hadley is a 28 year old female who presents for toenail concerns. Jammed right great toe 1.5 weeks ago.  Pain better this week.  Pain was worse for a few days after the incident.  Bled initially quite a bit.  No pus or drainage.  No fevers or chills.  Patient is currently a dancer in a play.  Wondering about getting the nail removed today so she has no concerns during the show.    Past Medical History:   Diagnosis Date     History of prior pregnancies          Molar pregnancy 2018       Past Surgical History:   Procedure Laterality Date     DENTAL SURGERY      wisdom teeth     DILATION AND CURETTAGE SUCTION N/A 2018    Procedure: DILATION AND CURETTAGE SUCTION;  Suction Dilation & Curettage;  Surgeon: Wily Mars MD;  Location:  OR       Family History   Problem Relation Age of Onset     Breast Cancer Paternal Grandmother         Cancer-breast     Breast Cancer Paternal Aunt         Cancer-breast       Social History     Tobacco Use     Smoking status: Never Smoker     Smokeless tobacco: Never Used   Substance Use Topics     Alcohol use: Yes     Alcohol/week: 0.0 oz     Comment: occasional       No current outpatient medications on file.       Allergies   Allergen Reactions     Cats Itching and Shortness Of Breath       REVIEW OF SYSTEMS:  Refer to HPI.    EXAM:   Vitals:    /70 (BP Location: Right arm, Patient Position: Sitting, Cuff Size: Adult Regular)   Pulse 80   Temp 97.9  F (36.6  C)   Resp 18   Wt 68.9 kg (152 lb)   BMI 24.91 kg/m      General Appearance: Pleasant, alert, appropriate appearance for age. No acute distress  Skin: Right great toenail is  from the nailbed.  Attached at the proximal base of the nailbed.  No surrounding  erythema, pus, drainage, warmth, tenderness.  Psychiatric Exam: Alert and oriented - appropriate affect.    Procedural note:   Options are discussed and she has elected to have full portion of right great toe nail removed. Time out was called.  Patient was prepped and draped in a proper sterile manner.  Under sterile conditions a digit block was performed with 1% Lidocaine without epi. Full portion of right great toe nail was removed. Hemostasis was achieved with direct pressure. Bacitracin and sterile guaze was then applied. She tolerated the procedure well.  No complications were appreciated.    PHQ Depression Screen  PHQ-9 SCORE 2/24/2016   PHQ-9 Total Score 7       ASSESSMENT AND PLAN:      ICD-10-CM    1. Nail abnormality L60.9 REMOVAL OF NAIL PLATE SIMPLE SINGLE       Removed right great toenail without complications.  Patient tolerated the procedure well.  Gave infection warning signs and symptoms.  No acute concerns at this time.  Recheck as needed.  Gave patient education.    Patient Instructions     Patient Education     Nail Removal (Finger/Toe Nail Plate)  Sometimes a toenail or fingernail must be removed because of injury or infection. Your nail has been removed. It will take a few weeks for the nail to start to grow back. It will take about 3 months for a fingernail to fully grow back. A toenail will take about 6 months.    Home care  The following guidelines will help you care for yourself at home:    Keep the injured part raised to help with pain and swelling. This is very important during the first 48 hours.    Apply an ice pack for 20 minutes every 3 to 6 hours during the first 24 to 48 hours. Keep using ice packs for swelling and pain relief as needed. To make an ice pack, put ice cubes in a plastic bag that seals at the top. Wrap the bag in a clean, thin towel or cloth. Never put ice or an ice pack directly on the skin. As the ice melts, be careful not to get any wrap, splint, or cast wet.       You can use over-the-counter medicine to control pain, unless another medicine was prescribed. Note: If you have chronic liver or kidney disease or ever had a stomach ulcer or GI bleeding, talk with your provider before using these medicines. Don t use ibuprofen in children under 6 months of age.    The nail bed (the tissue under the nail) is moist, soft, and sensitive. Protect the nail bed for the first 7 to 10 days until it dries out and becomes hard. Keep it covered with a dressing or adhesive bandage until that time.    Bandages tend to stick to a newly exposed nail bed. They can be hard to remove if left in place more than 24 hours. So change dressings every 24 hours, unless you were told otherwise. If needed, soak the dressing off while holding your finger or toe under warm running water.    If you were given antibiotics to treat or prevent infection, take them as directed until they are all gone.  Follow-up care  Follow up with your healthcare provider, or as advised. If X-rays were taken, you will be told of any new findings that may affect your care.  When to seek medical advice  Call your healthcare provider right away if any of the following occur:    Pain or swelling gets worse after 24 hours    Redness around the nail, or fluid draining from your finger or toe    Any red streaking develops    Fever of 100.4 F (38 C) or higher, or as directed by your provider  Date Last Reviewed: 8/1/2016 2000-2018 [employer]. [address] [place] [id number]. All rights reserved. This information is not intended as a substitute for professional medical care. Always follow your healthcare professional's instructions.               Olivia Rose PA-C..................2/25/2019 2:28 PM

## 2019-02-25 NOTE — PATIENT INSTRUCTIONS
Patient Education     Nail Removal (Finger/Toe Nail Plate)  Sometimes a toenail or fingernail must be removed because of injury or infection. Your nail has been removed. It will take a few weeks for the nail to start to grow back. It will take about 3 months for a fingernail to fully grow back. A toenail will take about 6 months.    Home care  The following guidelines will help you care for yourself at home:    Keep the injured part raised to help with pain and swelling. This is very important during the first 48 hours.    Apply an ice pack for 20 minutes every 3 to 6 hours during the first 24 to 48 hours. Keep using ice packs for swelling and pain relief as needed. To make an ice pack, put ice cubes in a plastic bag that seals at the top. Wrap the bag in a clean, thin towel or cloth. Never put ice or an ice pack directly on the skin. As the ice melts, be careful not to get any wrap, splint, or cast wet.      You can use over-the-counter medicine to control pain, unless another medicine was prescribed. Note: If you have chronic liver or kidney disease or ever had a stomach ulcer or GI bleeding, talk with your provider before using these medicines. Don t use ibuprofen in children under 6 months of age.    The nail bed (the tissue under the nail) is moist, soft, and sensitive. Protect the nail bed for the first 7 to 10 days until it dries out and becomes hard. Keep it covered with a dressing or adhesive bandage until that time.    Bandages tend to stick to a newly exposed nail bed. They can be hard to remove if left in place more than 24 hours. So change dressings every 24 hours, unless you were told otherwise. If needed, soak the dressing off while holding your finger or toe under warm running water.    If you were given antibiotics to treat or prevent infection, take them as directed until they are all gone.  Follow-up care  Follow up with your healthcare provider, or as advised. If X-rays were taken, you will be  told of any new findings that may affect your care.  When to seek medical advice  Call your healthcare provider right away if any of the following occur:    Pain or swelling gets worse after 24 hours    Redness around the nail, or fluid draining from your finger or toe    Any red streaking develops    Fever of 100.4 F (38 C) or higher, or as directed by your provider  Date Last Reviewed: 8/1/2016 2000-2018 [employer]. [address] [place] [address], [place] [address]. All rights reserved. This information is not intended as a substitute for professional medical care. Always follow your healthcare professional's instructions.

## 2019-03-25 ENCOUNTER — MYC MEDICAL ADVICE (OUTPATIENT)
Dept: FAMILY MEDICINE | Facility: OTHER | Age: 29
End: 2019-03-25

## 2019-04-01 ENCOUNTER — OFFICE VISIT (OUTPATIENT)
Dept: FAMILY MEDICINE | Facility: OTHER | Age: 29
End: 2019-04-01
Attending: FAMILY MEDICINE
Payer: COMMERCIAL

## 2019-04-01 VITALS
RESPIRATION RATE: 15 BRPM | HEIGHT: 66 IN | TEMPERATURE: 98.2 F | HEART RATE: 80 BPM | SYSTOLIC BLOOD PRESSURE: 104 MMHG | DIASTOLIC BLOOD PRESSURE: 60 MMHG | BODY MASS INDEX: 23.78 KG/M2 | WEIGHT: 148 LBS

## 2019-04-01 DIAGNOSIS — Z87.59 HISTORY OF MOLAR PREGNANCY: ICD-10-CM

## 2019-04-01 DIAGNOSIS — N91.2 AMENORRHEA: Primary | ICD-10-CM

## 2019-04-01 LAB
B-HCG SERPL-ACNC: 5712 IU/L
HCG UR QL: POSITIVE

## 2019-04-01 PROCEDURE — 99213 OFFICE O/P EST LOW 20 MIN: CPT | Performed by: FAMILY MEDICINE

## 2019-04-01 PROCEDURE — 36415 COLL VENOUS BLD VENIPUNCTURE: CPT | Performed by: FAMILY MEDICINE

## 2019-04-01 PROCEDURE — 81025 URINE PREGNANCY TEST: CPT | Performed by: FAMILY MEDICINE

## 2019-04-01 PROCEDURE — 84702 CHORIONIC GONADOTROPIN TEST: CPT | Performed by: FAMILY MEDICINE

## 2019-04-01 ASSESSMENT — PAIN SCALES - GENERAL: PAINLEVEL: NO PAIN (0)

## 2019-04-01 ASSESSMENT — MIFFLIN-ST. JEOR: SCORE: 1405.13

## 2019-04-01 NOTE — PROGRESS NOTES
"S/CC:  Jenny Hadley is a 28 year old female  in today forpregnancy verification.  Nursing Notes:   Jalyn Bullard LPN  2019  1:49 PM  Signed  Patient presents to the clinic today for amenorrhea, she had a positive home pregnancy test on 3/30/19.   Medication Reconciliation: complete     Jalyn Bullard LPN.................. 2019 1:36 PM    LMP:   Sure/Normal yes   Home pregnancy test: 3/30/19  EDC 12/3/19    Symptoms: tiredness  PNV: yes, has these       Risk factors reviewed: history of molar pregnancy    Advise patients with a prior HM to have a first trimester ultrasound to confirm normal gestational development in subsequent pregnancies. Additionally, we measure a serum hCG at six weeks after the completion of any type of future pregnancy (eg, term delivery, spontaneous , induced ) to exclude choriocarcinoma. After  or term deliveries in patients with prior molar pregnancy or GTN, the placenta should be carefully examined and sent to pathology if any abnormalities are present.       Allergies   Allergen Reactions     Cats Itching and Shortness Of Breath      No current outpatient medications on file.     No current facility-administered medications for this visit.        Past Medical History:   Diagnosis Date     History of prior pregnancies          Molar pregnancy 2018        Exam:  /60   Pulse 80   Temp 98.2  F (36.8  C) (Tympanic)   Resp 15   Ht 1.664 m (5' 5.5\")   Wt 67.1 kg (148 lb)   LMP 2018   Breastfeeding? No   BMI 24.25 kg/m       Results for orders placed or performed in visit on 19   HCG qualitative urine   Result Value Ref Range    HCG Qual Urine Positive (A) NEG^Negative         Assessment and Plan:1.  Pregnancy confirmation.  2. History of molar pregnancy      1.  EDC established.  Will confirm with ultrasound.  2.  PNVit.  3.  Reviewed safe OTC meds.  4.Reviewed self care/comfort care.  5.  Risk " factors reviewed.  She will need early ultrasound to confirm IUP, non-recurrent molar pregnancy.  Risk is under 2%.  Discussed recommendation that her placenta be evaluated in lab at the end of pregnancy due to GTD risk.    6.  OB physical scheduled at 10-12 weeks.  7.  Call if bleeding, severe cramping or other concerns.    Pia Tong MD

## 2019-04-01 NOTE — NURSING NOTE
Patient presents to the clinic today for amenorrhea, she had a positive home pregnancy test on 3/30/19.   Medication Reconciliation: complete     Jalyn Bullard LPN.................. 4/1/2019 1:36 PM

## 2019-04-16 ENCOUNTER — HOSPITAL ENCOUNTER (OUTPATIENT)
Dept: ULTRASOUND IMAGING | Facility: OTHER | Age: 29
Discharge: HOME | End: 2019-04-16
Attending: FAMILY MEDICINE | Admitting: FAMILY MEDICINE
Payer: COMMERCIAL

## 2019-04-16 DIAGNOSIS — Z87.59 HISTORY OF MOLAR PREGNANCY: ICD-10-CM

## 2019-04-16 DIAGNOSIS — N83.209 OVARIAN CYST DURING PREGNANCY IN FIRST TRIMESTER: Primary | ICD-10-CM

## 2019-04-16 DIAGNOSIS — N91.2 AMENORRHEA: ICD-10-CM

## 2019-04-16 DIAGNOSIS — O34.81 OVARIAN CYST DURING PREGNANCY IN FIRST TRIMESTER: Primary | ICD-10-CM

## 2019-04-16 PROCEDURE — 76817 TRANSVAGINAL US OBSTETRIC: CPT

## 2019-04-23 ENCOUNTER — PRENATAL OFFICE VISIT (OUTPATIENT)
Dept: FAMILY MEDICINE | Facility: OTHER | Age: 29
End: 2019-04-23
Attending: FAMILY MEDICINE
Payer: COMMERCIAL

## 2019-04-23 VITALS
WEIGHT: 146 LBS | HEART RATE: 96 BPM | BODY MASS INDEX: 23.46 KG/M2 | DIASTOLIC BLOOD PRESSURE: 60 MMHG | TEMPERATURE: 97.9 F | RESPIRATION RATE: 16 BRPM | SYSTOLIC BLOOD PRESSURE: 98 MMHG | HEIGHT: 66 IN

## 2019-04-23 DIAGNOSIS — O09.90 HIGH-RISK PREGNANCY, UNSPECIFIED TRIMESTER: Primary | ICD-10-CM

## 2019-04-23 LAB
ABO + RH BLD: NORMAL
ABO + RH BLD: NORMAL
ALBUMIN UR-MCNC: NEGATIVE MG/DL
APPEARANCE UR: CLEAR
BILIRUB UR QL STRIP: NEGATIVE
BLD GP AB SCN SERPL QL: NORMAL
BLOOD BANK CMNT PATIENT-IMP: NORMAL
C TRACH DNA SPEC QL PROBE+SIG AMP: NOT DETECTED
COLOR UR AUTO: YELLOW
ERYTHROCYTE [DISTWIDTH] IN BLOOD BY AUTOMATED COUNT: 12.1 % (ref 10–15)
GLUCOSE UR STRIP-MCNC: NEGATIVE MG/DL
HCT VFR BLD AUTO: 37.5 % (ref 35–47)
HGB BLD-MCNC: 12.7 G/DL (ref 11.7–15.7)
HGB UR QL STRIP: NEGATIVE
KETONES UR STRIP-MCNC: NEGATIVE MG/DL
LEUKOCYTE ESTERASE UR QL STRIP: NEGATIVE
MCH RBC QN AUTO: 29.3 PG (ref 26.5–33)
MCHC RBC AUTO-ENTMCNC: 33.9 G/DL (ref 31.5–36.5)
MCV RBC AUTO: 87 FL (ref 78–100)
N GONORRHOEA DNA SPEC QL PROBE+SIG AMP: NOT DETECTED
NITRATE UR QL: NEGATIVE
PH UR STRIP: 8 PH (ref 5–9)
PLATELET # BLD AUTO: 293 10E9/L (ref 150–450)
RBC # BLD AUTO: 4.33 10E12/L (ref 3.8–5.2)
SOURCE: NORMAL
SP GR UR STRIP: 1.02 (ref 1–1.03)
SPECIMEN EXP DATE BLD: NORMAL
SPECIMEN SOURCE: NORMAL
SPECIMEN SOURCE: NORMAL
UROBILINOGEN UR STRIP-ACNC: 0.2 EU/DL (ref 0.2–1)
WBC # BLD AUTO: 8.9 10E9/L (ref 4–11)
WET PREP SPEC: NORMAL

## 2019-04-23 PROCEDURE — 87340 HEPATITIS B SURFACE AG IA: CPT | Mod: ZL | Performed by: FAMILY MEDICINE

## 2019-04-23 PROCEDURE — 86901 BLOOD TYPING SEROLOGIC RH(D): CPT | Mod: ZL | Performed by: FAMILY MEDICINE

## 2019-04-23 PROCEDURE — 87210 SMEAR WET MOUNT SALINE/INK: CPT | Mod: ZL | Performed by: FAMILY MEDICINE

## 2019-04-23 PROCEDURE — 87491 CHLMYD TRACH DNA AMP PROBE: CPT | Mod: ZL | Performed by: FAMILY MEDICINE

## 2019-04-23 PROCEDURE — 87591 N.GONORRHOEAE DNA AMP PROB: CPT | Mod: ZL | Performed by: FAMILY MEDICINE

## 2019-04-23 PROCEDURE — 87086 URINE CULTURE/COLONY COUNT: CPT | Mod: ZL | Performed by: FAMILY MEDICINE

## 2019-04-23 PROCEDURE — 86850 RBC ANTIBODY SCREEN: CPT | Mod: ZL | Performed by: FAMILY MEDICINE

## 2019-04-23 PROCEDURE — 87389 HIV-1 AG W/HIV-1&-2 AB AG IA: CPT | Mod: ZL | Performed by: FAMILY MEDICINE

## 2019-04-23 PROCEDURE — 0064U ANTB TP TOTAL&RPR IA QUAL: CPT | Mod: ZL | Performed by: FAMILY MEDICINE

## 2019-04-23 PROCEDURE — 81003 URINALYSIS AUTO W/O SCOPE: CPT | Mod: ZL | Performed by: FAMILY MEDICINE

## 2019-04-23 PROCEDURE — 86900 BLOOD TYPING SEROLOGIC ABO: CPT | Mod: ZL | Performed by: FAMILY MEDICINE

## 2019-04-23 PROCEDURE — 36415 COLL VENOUS BLD VENIPUNCTURE: CPT | Mod: ZL | Performed by: FAMILY MEDICINE

## 2019-04-23 PROCEDURE — 99207 ZZC OB VISIT-NO CHARGE - GICH ONLY: CPT | Performed by: FAMILY MEDICINE

## 2019-04-23 PROCEDURE — G0123 SCREEN CERV/VAG THIN LAYER: HCPCS | Performed by: FAMILY MEDICINE

## 2019-04-23 PROCEDURE — 85027 COMPLETE CBC AUTOMATED: CPT | Mod: ZL | Performed by: FAMILY MEDICINE

## 2019-04-23 PROCEDURE — 86762 RUBELLA ANTIBODY: CPT | Mod: ZL | Performed by: FAMILY MEDICINE

## 2019-04-23 ASSESSMENT — MIFFLIN-ST. JEOR: SCORE: 1396.06

## 2019-04-23 ASSESSMENT — PAIN SCALES - GENERAL: PAINLEVEL: MODERATE PAIN (4)

## 2019-04-23 NOTE — PROGRESS NOTES
PRENATAL EXAM - OB    Jenny Hadley is a 28 year old female,  7w6d  here today for a prenatal exam.    Her last pregnancy was complicated by this being a molar pregnancy.  She subsequently underwent D&C.  She had follow-up hCG levels checked until these were negative.  LMP:  No LMP recorded. Patient is pregnant.  First trimester ultrasound: Has been completed.  EDC established at .  There is a finding of an ovarian cyst and she will have this rechecked next month  Marital Status:   Occupation:      [place] Provider:  Pia Tong MD   Education (last grade completed): College    /Fatherof baby: Andrea      MENSTRUAL HISTORY  LMP:  No LMP recorded. Patient is pregnant.    MEDICAL HISTORY  OB History    Para Term  AB Living   3 1 1 0 1 1   SAB TAB Ectopic Multiple Live Births   0 0 0 0 1      # Outcome Date GA Lbr Juanito/2nd Weight Sex Delivery Anes PTL Lv   3 Current            2 Molar 18           1 Term 16 40w0d  3.487 kg (7 lb 11 oz) M  EPI N FARHAN      Name: Hira       Past Medical History:   Diagnosis Date     History of prior pregnancies     , EDC      Molar pregnancy 2018       Past Surgical History:   Procedure Laterality Date     DENTAL SURGERY      wisdom teeth     DILATION AND CURETTAGE SUCTION N/A 2018    Procedure: DILATION AND CURETTAGE SUCTION;  Suction Dilation & Curettage;  Surgeon: Wily Mars MD;  Location:  OR       Family History:  Family History   Problem Relation Age of Onset     Breast Cancer Paternal Grandmother         Cancer-breast     Breast Cancer Paternal Aunt         Cancer-breast       Social History:  Social History     Tobacco Use     Smoking status: Never Smoker     Smokeless tobacco: Never Used   Substance Use Topics     Alcohol use: Yes     Alcohol/week: 0.0 oz     Comment: occasional       RISK FACTORS  Exercise Times/wk: Between 0-3  Seat Belt Use:  "100%  Alcohol/day: None with pregnancy  Meds/Drugs/ETOH Since LMP:  {None  High Risk Behavior: None    INFECTION HISTORY  Current Drug Use: None  HIV Risk Evaluation: Previous test negative  Hepatitis B Risk Evaluation: Previous test negative  Hepatitis B Immunized: Yes  TB Exposure:No    Personal History of Genital Herpes: No  Partner History of Genital Herpes: No  Rash/Viral Illness Since LMP: No  History of STD:No    REVIEW OF SYSTEMS   Right hip pain, had some sciatica type symptoms too. This is same side that bothered her when she was pregnant with her son - but then her symptoms were in the third trimester.   Last dentist was over a year ago.      PHYSICAL EXAM  BP 98/60   Pulse 96   Temp 97.9  F (36.6  C) (Tympanic)   Resp 16   Ht 1.664 m (5' 5.5\")   Wt 66.2 kg (146 lb)   Breastfeeding? No   BMI 23.93 kg/m    General Appearance:  Alert, appropriate appearance for age. No acute distress  HEENT Exam:  Grossly normal.  Neck / Thyroid Exam:  Supple, nomasses, nodes or enlargement.  Chest/Respiratory Exam: Normal chest wall and respirations. Clear to auscultation.  Breast Exam: No dimpling, nipple retraction or discharge. No masses or nodes.  Cardiovascular Exam:Regular rate and rhythm. S1, S2, no murmur, click, gallop, or rubs.  Gastrointestinal Exam: Soft, non-tender, no masses or organomegaly.  Pelvic Exam Female: Vaginal discharge is present.  Pap smear GC chlamydia and wet prep are obtained today.  Bimanual exam reveals nontender enlarged uterus  Lymphatic Exam: Non-palpable nodes in neck, clavicular, axillary, or inguinal regions.  Musculoskeletal Exam: Back is straight and non-tender, full ROM of upper and lower extremities.  Skin: norash or abnormalities  Neurologic Exam: reflexes normal   Psychiatric Exam: Alert and oriented, appropriate affect.    ASSESSMENT  1. High-risk pregnancy, unspecified trimester          PLAN:  PN labs obtained today.  Denita with patient risk factors for her pregnancy, " history of molar pregnancy.  She is also aware of need for a follow-up ultrasound to recheck ovarian cyst.  At delivery, placenta will need to be sent to pathology.  Nursing risk and history form reviewed.    Reviewed health maintenance issues including diet,exercise, caffeine intake, handling of cat litter, daily vitamins, child birth classes, and regular prenatal exams.  New ob folder with pt education information has been given.  She was encouraged to call the office with anyquestions or concerns.  Follow up in a month.      GEMMA BAILEY ....................  4/23/2019   11:51 AM     Lab Results   Component Value Date    ABO A 05/01/2018    RH Pos 05/01/2018    [id number] A Rh Positive 04/06/2016

## 2019-04-23 NOTE — NURSING NOTE
Patient presents to the clinic today for a ob px. RAVI by US is 12/4/19.   Medication Reconciliation: complete     Jalyn Bullard LPN.................. 4/23/2019 11:38 AM

## 2019-04-24 LAB
HBV SURFACE AG SERPL QL IA: NONREACTIVE
HIV 1+2 AB+HIV1 P24 AG SERPL QL IA: NONREACTIVE
RUBV IGG SERPL IA-ACNC: 39 IU/ML
T PALLIDUM AB SER QL: NONREACTIVE

## 2019-04-25 LAB
BACTERIA SPEC CULT: NORMAL
COPATH REPORT: NORMAL
PAP: NORMAL
SPECIMEN SOURCE: NORMAL

## 2019-05-03 ENCOUNTER — TELEPHONE (OUTPATIENT)
Dept: FAMILY MEDICINE | Facility: OTHER | Age: 29
End: 2019-05-03

## 2019-05-03 NOTE — TELEPHONE ENCOUNTER
Patient called in regards to getting a note for her insurance company that states she fell and hurt her toe. This accident was back in October. She states she saw Olivia Rose and she ended up removing the toenail. Please call her back in regards to this. Thank You!

## 2019-05-03 NOTE — TELEPHONE ENCOUNTER
Is there specific paperwork I need to complete?    If it is just a note, does she recall the date of the injury? I removed the toenail on 2/25/19.   I can write a note stating that the patient had a toe injury and her toenail was removed.  I would write on the note that the patient can return back to work without restrictions.  Olivia Rose PA-C.......... 5/3/2019 2:09 PM

## 2019-05-03 NOTE — LETTER
May 3, 2019      Jenny Hadley  [address] [address]        To Whom It May Concern,     Jenny Hadley ( 1990) was seen in the clinic on 2019 for a toe injury.  Patient injured her toe on 2019.  During the visit on 2019 the patient had her toenail removed without complications.  The patient does not need work restrictions.  No further follow-up is warranted.      Sincerely,        Olivia Rose PA-C

## 2019-05-03 NOTE — TELEPHONE ENCOUNTER
Patient states is trying to get [employer] insurance, is requesting a note stating she slipped and stubbed her toe.  You saw her back in February and removed a toenail.  Saba Walsh LPN ...... 5/3/2019 1:12 PM

## 2019-05-03 NOTE — TELEPHONE ENCOUNTER
No paper work, injured it on 2/13/19. Does not need work restrictions noted, is a [facility].  Saba Walsh LPN ...... 5/3/2019 2:25 PM

## 2019-05-21 ENCOUNTER — PRENATAL OFFICE VISIT (OUTPATIENT)
Dept: FAMILY MEDICINE | Facility: OTHER | Age: 29
End: 2019-05-21
Attending: FAMILY MEDICINE
Payer: COMMERCIAL

## 2019-05-21 ENCOUNTER — HOSPITAL ENCOUNTER (OUTPATIENT)
Dept: ULTRASOUND IMAGING | Facility: OTHER | Age: 29
Discharge: HOME | End: 2019-05-21
Attending: FAMILY MEDICINE | Admitting: FAMILY MEDICINE
Payer: COMMERCIAL

## 2019-05-21 VITALS
HEIGHT: 66 IN | HEART RATE: 84 BPM | DIASTOLIC BLOOD PRESSURE: 56 MMHG | TEMPERATURE: 97.6 F | BODY MASS INDEX: 22.82 KG/M2 | WEIGHT: 142 LBS | RESPIRATION RATE: 16 BRPM | SYSTOLIC BLOOD PRESSURE: 106 MMHG

## 2019-05-21 DIAGNOSIS — Z87.59 HISTORY OF MOLAR PREGNANCY: Primary | ICD-10-CM

## 2019-05-21 DIAGNOSIS — O34.81 OVARIAN CYST DURING PREGNANCY IN FIRST TRIMESTER: ICD-10-CM

## 2019-05-21 DIAGNOSIS — O09.90 HIGH RISK PREGNANCY, ANTEPARTUM: ICD-10-CM

## 2019-05-21 DIAGNOSIS — N83.209 OVARIAN CYST DURING PREGNANCY IN FIRST TRIMESTER: ICD-10-CM

## 2019-05-21 PROCEDURE — 76801 OB US < 14 WKS SINGLE FETUS: CPT

## 2019-05-21 PROCEDURE — 99207 ZZC OB VISIT-NO CHARGE - GICH ONLY: CPT | Performed by: FAMILY MEDICINE

## 2019-05-21 ASSESSMENT — MIFFLIN-ST. JEOR: SCORE: 1377.92

## 2019-05-21 ASSESSMENT — PAIN SCALES - GENERAL: PAINLEVEL: NO PAIN (0)

## 2019-05-21 NOTE — NURSING NOTE
Patient presents to the clinic today for a ob check, she is 11w6d.   Medication Reconciliation: complete    Jalyn Bullard LPN.................. 5/21/2019 10:14 AM

## 2019-05-21 NOTE — PROGRESS NOTES
Nursing Notes:   Jalyn Bullard LPN  2019 10:14 AM  Signed  Patient presents to the clinic today for a ob check, she is 11w6d.   Medication Reconciliation: complete    Jalyn Bullard LPN.................. 2019 10:14 AM      Jenny Hadley is a 28 year old female   at 11w6d   Nausea has resolved.  Weight loss is noted.  Is having some constipation.    1.  US at 18-22 weeks.  Previous findings of an ovarian cyst - this is smaller on today's ultrasound but not completely resolved.    2.  Quad screen discussed and offered along with first trimester testing   - declines first trimester but is thinking of having quad screen done.  3.  Reviewed normal physiologic changes in mom, self cares.  4.  Discussed current fetal development.  5.  GTT, treponema and hgb at 24-28 weeks.  6.   Lab Results   Component Value Date    ABO A 2019    RH Pos 2019    [id number] A Rh Positive 2016         ICD-10-CM    1. History of molar pregnancy Z87.59    2. High risk pregnancy, antepartum O09.90        RTC 4 weeks.  Will need follow-up ultrasound on her cyst.    Pia Tong MD, FAAFP

## 2019-06-26 ENCOUNTER — PRENATAL OFFICE VISIT (OUTPATIENT)
Dept: FAMILY MEDICINE | Facility: OTHER | Age: 29
End: 2019-06-26
Attending: FAMILY MEDICINE
Payer: COMMERCIAL

## 2019-06-26 VITALS
HEIGHT: 66 IN | BODY MASS INDEX: 23.75 KG/M2 | SYSTOLIC BLOOD PRESSURE: 102 MMHG | RESPIRATION RATE: 16 BRPM | DIASTOLIC BLOOD PRESSURE: 56 MMHG | TEMPERATURE: 97.8 F | HEART RATE: 88 BPM | WEIGHT: 147.8 LBS

## 2019-06-26 DIAGNOSIS — O09.90 HIGH RISK PREGNANCY, ANTEPARTUM: ICD-10-CM

## 2019-06-26 PROCEDURE — 99207 ZZC OB VISIT-NO CHARGE - GICH ONLY: CPT | Performed by: FAMILY MEDICINE

## 2019-06-26 PROCEDURE — 81511 FTL CGEN ABNOR FOUR ANAL: CPT | Mod: ZL | Performed by: FAMILY MEDICINE

## 2019-06-26 PROCEDURE — 36415 COLL VENOUS BLD VENIPUNCTURE: CPT | Mod: ZL | Performed by: FAMILY MEDICINE

## 2019-06-26 ASSESSMENT — MIFFLIN-ST. JEOR: SCORE: 1404.23

## 2019-06-26 ASSESSMENT — PAIN SCALES - GENERAL: PAINLEVEL: MILD PAIN (2)

## 2019-06-26 NOTE — NURSING NOTE
Patient presents to the clinic today for a ob check, she is 17w0d.   Medication Reconciliation: complete     Jalyn Bullard LPN.................. 6/26/2019 10:36 AM

## 2019-06-26 NOTE — PROGRESS NOTES
Nursing Notes:   Jalyn Bullard LPN  2019 10:49 AM  Signed  Patient presents to the clinic today for a ob check, she is 17w0d.   Medication Reconciliation: complete     Jalyn Bullard LPN.................. 2019 10:36 AM      Jenny Hadley is a 28 year old female   at 17w0d       1.  US at 18-22 weeks.  2.  Quad screen discussed and offered: to be obtained today.  3.  Reviewed normal physiologic changes in mom, self cares - has had some vulvar swelling.  4.  Discussed current fetal development.  5.  GTT, treponema and hgb at 24-28 weeks.  6.   Lab Results   Component Value Date    ABO A 2019    RH Pos 2019    [id number] A Rh Positive 2016         ICD-10-CM    1. High risk pregnancy, antepartum O09.90 Maternal Quad Marker 2nd Trimester     US OB > 14 Weeks     History of molar pregnancy - placenta to pathology at delivery  RTC 4 weeks.    Pia Tong MD, FAAFP

## 2019-07-01 LAB
# FETUSES US: NORMAL
# FETUSES: 1
AFP ADJ MOM AMN: 0.77
AFP SERPL-MCNC: 29 NG/ML
AGE - REPORTED: 30.1 YR
CURRENT SMOKER: NO
CURRENT SMOKER: NO
DIABETES STATUS PATIENT: NO
FAMILY MEMBER DISEASES HX: NO
FAMILY MEMBER DISEASES HX: NO
GA METHOD: NORMAL
GA METHOD: NORMAL
GA: NORMAL WK
HCG MOM SERPL: 1.61
HCG SERPL-ACNC: NORMAL IU/L
HX OF HEREDITARY DISORDERS: NORMAL
IDDM PATIENT QL: NO
INHIBIN A MOM SERPL: 0.93
INHIBIN A SERPL-MCNC: 149 PG/ML
INTEGRATED SCN PATIENT-IMP: NORMAL
IVF PREGNANCY: NO
LMP START DATE: NORMAL
MONOCHORIONIC TWINS: NO
PATHOLOGY STUDY: NORMAL
PREV FETUS DEFECT: NO
SERVICE CMNT-IMP: NO
SPECIMEN DRAWN SERPL: NORMAL
U ESTRIOL MOM SERPL: 0.95
U ESTRIOL SERPL-MCNC: 1.1 NG/ML
VALPROIC/CARBAMAZEPINE STATUS: NO
WEIGHT UNITS: NORMAL

## 2019-07-16 ENCOUNTER — HOSPITAL ENCOUNTER (OUTPATIENT)
Dept: ULTRASOUND IMAGING | Facility: OTHER | Age: 29
Discharge: HOME | End: 2019-07-16
Attending: FAMILY MEDICINE | Admitting: FAMILY MEDICINE
Payer: COMMERCIAL

## 2019-07-16 DIAGNOSIS — O09.90 HIGH RISK PREGNANCY, ANTEPARTUM: ICD-10-CM

## 2019-07-16 PROCEDURE — 76805 OB US >/= 14 WKS SNGL FETUS: CPT

## 2019-07-24 ENCOUNTER — PRENATAL OFFICE VISIT (OUTPATIENT)
Dept: FAMILY MEDICINE | Facility: OTHER | Age: 29
End: 2019-07-24
Attending: FAMILY MEDICINE
Payer: COMMERCIAL

## 2019-07-24 VITALS
HEART RATE: 76 BPM | SYSTOLIC BLOOD PRESSURE: 104 MMHG | TEMPERATURE: 97.5 F | RESPIRATION RATE: 14 BRPM | WEIGHT: 155 LBS | HEIGHT: 66 IN | BODY MASS INDEX: 24.91 KG/M2 | DIASTOLIC BLOOD PRESSURE: 60 MMHG

## 2019-07-24 DIAGNOSIS — Z87.59 HISTORY OF MOLAR PREGNANCY: ICD-10-CM

## 2019-07-24 DIAGNOSIS — O09.90 HIGH RISK PREGNANCY, ANTEPARTUM: Primary | ICD-10-CM

## 2019-07-24 PROCEDURE — 99207 ZZC OB VISIT-NO CHARGE - GICH ONLY: CPT | Performed by: FAMILY MEDICINE

## 2019-07-24 ASSESSMENT — PAIN SCALES - GENERAL: PAINLEVEL: MILD PAIN (2)

## 2019-07-24 ASSESSMENT — MIFFLIN-ST. JEOR: SCORE: 1436.89

## 2019-07-24 NOTE — NURSING NOTE
Patient presents to the clinic today for a ob check, she is 21w0d.   Medication Reconciliation: complete     Jalyn Bullard LPN.................. 7/24/2019 3:40 PM

## 2019-07-24 NOTE — PROGRESS NOTES
Nursing Notes:   Jalyn Bullard LPN  2019  4:16 PM  Signed  Patient presents to the clinic today for a ob check, she is 21w0d.   Medication Reconciliation: complete     Jalyn Bullard LPN.................. 2019 3:40 PM      Jenny Hadley is a 28 year old female   at 21w0d   Pregnancy notable for history of molar pregnancy.  Ultrasounds this pregnancy have shown ovarian cyst.  Feeling movement.  Heartburn - takes tums at night and stool softeners during the day.        1.  US at 18-22 weeks.  2.  Quad screen discussed and offered: already done.    3.  Reviewed normal physiologic changes in mom, self cares.  4.  Discussed current fetal development.  5.  GTT, treponema and hgb at 24-28 weeks.  6.   Lab Results   Component Value Date    ABO A 2019    RH Pos 2019    [id number] A Rh Positive 2016         ICD-10-CM    1. High risk pregnancy, antepartum O09.90    2. History of molar pregnancy Z87.59        RTC 4 weeks.    Pia Tong MD, FAAFP

## 2019-08-21 ENCOUNTER — PRENATAL OFFICE VISIT (OUTPATIENT)
Dept: FAMILY MEDICINE | Facility: OTHER | Age: 29
End: 2019-08-21
Attending: FAMILY MEDICINE
Payer: COMMERCIAL

## 2019-08-21 VITALS
WEIGHT: 160 LBS | BODY MASS INDEX: 25.71 KG/M2 | TEMPERATURE: 98.8 F | DIASTOLIC BLOOD PRESSURE: 60 MMHG | SYSTOLIC BLOOD PRESSURE: 118 MMHG | HEART RATE: 84 BPM | HEIGHT: 66 IN | RESPIRATION RATE: 14 BRPM

## 2019-08-21 DIAGNOSIS — O09.90 HIGH RISK PREGNANCY, ANTEPARTUM: Primary | ICD-10-CM

## 2019-08-21 PROCEDURE — 99207 ZZC OB VISIT-NO CHARGE - GICH ONLY: CPT | Performed by: FAMILY MEDICINE

## 2019-08-21 ASSESSMENT — PAIN SCALES - GENERAL: PAINLEVEL: NO PAIN (0)

## 2019-08-21 ASSESSMENT — MIFFLIN-ST. JEOR: SCORE: 1459.57

## 2019-08-21 NOTE — PROGRESS NOTES
Nursing Notes:   Jalyn Bullard LPN  2019  2:27 PM  Signed  Patient presents to the clinic today for a ob check, she is 25w0d.   Medication Reconciliation: complete     Jalyn Bullard LPN.................. 2019 2:19 PM      Jenny Hadley is a 28 year old female  at 25w0d     She is having ongoing reflux symptoms - relieved by tums  Swelling - primarily vulvar.      1.  US reviewed.  Gender - DNK  2.  Labor class plans: considering going again, work on breathing, would consider epidural again.  Infant feeding plans breast  3.  Reviewed normal physiologic changes in mom, self cares.  4.  Discussed current fetal development.  5.  GTT, treponema and hgb at next visit  6.    Lab Results   Component Value Date    ABO A 2019    RH Pos 2019    [id number] A Rh Positive 2016     7.  RTC 4 weeks.      ICD-10-CM    1. High risk pregnancy, antepartum O09.90      Reviewed labs for next visit, boostrix   Placenta to pathology post-delivery     Pia Tong MD, FAAFP

## 2019-08-21 NOTE — NURSING NOTE
Patient presents to the clinic today for a ob check, she is 25w0d.   Medication Reconciliation: complete     Jalyn Bullard LPN.................. 8/21/2019 2:19 PM

## 2019-09-10 NOTE — PROGRESS NOTES
"Nursing Notes:   Volodymyrrashawn Nicole, LPN  2019  2:43 PM  Signed  Chief Complaint   Patient presents with     Prenatal Care     28 weeks   Patient presents to the clinic for prenatal visit at 28 weeks    Medication Reconciliation: completed   Nicole VolodymyrrashawnCARLYLE  2019 2:21 PM    There were no vitals taken for this visit.   Jenny Hadley is a 28 year old female   28wks    Hemoglobin, treponema and Glucose test being done today.  Having more heartburn.  Recent hemorrhoids too.    Lab Results   Component Value Date    ABO A 2019    RH Pos 2019    [id number] A Rh Positive 2016     Discussed Boostrix update - and immunization updated.  [facility] \"[facility]  The patient will be prepared to sign the consent at the time of hospital admission.    She is thinking about an epidural again.   Gaviota dee discussed - when she did these with her son he moved a lot    PP birth control: declined   Reviewed signs and symptoms of PTL, preeclampsia  Currently novaginal discharge, bleeding cramping.  Follow up appointment schedule for last trimester reviewed.  Her placenta will need to go to pathology due to her history of molar pregnancy.      ICD-10-CM    1. High risk pregnancy, antepartum O09.90    2. History of molar pregnancy Z87.59        Pia Tong MD     "

## 2019-09-11 ENCOUNTER — PRENATAL OFFICE VISIT (OUTPATIENT)
Dept: FAMILY MEDICINE | Facility: OTHER | Age: 29
End: 2019-09-11
Attending: FAMILY MEDICINE
Payer: COMMERCIAL

## 2019-09-11 VITALS
SYSTOLIC BLOOD PRESSURE: 108 MMHG | HEIGHT: 66 IN | WEIGHT: 167 LBS | RESPIRATION RATE: 16 BRPM | BODY MASS INDEX: 26.84 KG/M2 | TEMPERATURE: 98.3 F | DIASTOLIC BLOOD PRESSURE: 60 MMHG | HEART RATE: 60 BPM

## 2019-09-11 DIAGNOSIS — Z87.59 HISTORY OF MOLAR PREGNANCY: ICD-10-CM

## 2019-09-11 DIAGNOSIS — R73.09 ELEVATED GLUCOSE: Primary | ICD-10-CM

## 2019-09-11 DIAGNOSIS — O09.90 HIGH RISK PREGNANCY, ANTEPARTUM: Primary | ICD-10-CM

## 2019-09-11 DIAGNOSIS — O09.90 HIGH RISK PREGNANCY, ANTEPARTUM: ICD-10-CM

## 2019-09-11 LAB
GLUCOSE 1H P 50 G GLC PO SERPL-MCNC: 143 MG/DL (ref 60–129)
HGB BLD-MCNC: 11 G/DL (ref 11.7–15.7)

## 2019-09-11 PROCEDURE — 86780 TREPONEMA PALLIDUM: CPT | Mod: ZL | Performed by: FAMILY MEDICINE

## 2019-09-11 PROCEDURE — 85018 HEMOGLOBIN: CPT | Mod: ZL | Performed by: FAMILY MEDICINE

## 2019-09-11 PROCEDURE — 99207 ZZC OB VISIT-NO CHARGE - GICH ONLY: CPT | Performed by: FAMILY MEDICINE

## 2019-09-11 PROCEDURE — 90471 IMMUNIZATION ADMIN: CPT | Performed by: FAMILY MEDICINE

## 2019-09-11 PROCEDURE — 82950 GLUCOSE TEST: CPT | Mod: ZL | Performed by: FAMILY MEDICINE

## 2019-09-11 PROCEDURE — 90715 TDAP VACCINE 7 YRS/> IM: CPT | Performed by: FAMILY MEDICINE

## 2019-09-11 PROCEDURE — 36415 COLL VENOUS BLD VENIPUNCTURE: CPT | Mod: ZL | Performed by: FAMILY MEDICINE

## 2019-09-11 ASSESSMENT — PAIN SCALES - GENERAL: PAINLEVEL: NO PAIN (0)

## 2019-09-11 ASSESSMENT — MIFFLIN-ST. JEOR: SCORE: 1491.32

## 2019-09-11 NOTE — NURSING NOTE
Chief Complaint   Patient presents with     Prenatal Care     28 weeks   Patient presents to the clinic for prenatal visit at 28 weeks    Medication Reconciliation: completed   Nicole Salmon LPN  9/11/2019 2:21 PM

## 2019-09-13 LAB — T PALLIDUM AB SER QL: NONREACTIVE

## 2019-09-27 DIAGNOSIS — R73.09 ELEVATED GLUCOSE: ICD-10-CM

## 2019-09-27 LAB
GLUCOSE 1H P 100 G GLC PO SERPL-MCNC: 132 MG/DL (ref 60–179)
GLUCOSE 2H P 100 G GLC PO SERPL-MCNC: 118 MG/DL (ref 60–154)
GLUCOSE 3H P 100 G GLC PO SERPL-MCNC: 121 MG/DL (ref 60–139)
GLUCOSE P FAST SERPL-MCNC: 109 MG/DL (ref 60–94)

## 2019-09-27 PROCEDURE — 82951 GLUCOSE TOLERANCE TEST (GTT): CPT | Mod: ZL | Performed by: FAMILY MEDICINE

## 2019-09-27 PROCEDURE — 82952 GTT-ADDED SAMPLES: CPT | Mod: ZL | Performed by: FAMILY MEDICINE

## 2019-09-27 PROCEDURE — 36415 COLL VENOUS BLD VENIPUNCTURE: CPT | Mod: ZL | Performed by: FAMILY MEDICINE

## 2019-10-08 ENCOUNTER — PRENATAL OFFICE VISIT (OUTPATIENT)
Dept: FAMILY MEDICINE | Facility: OTHER | Age: 29
End: 2019-10-08
Attending: FAMILY MEDICINE
Payer: COMMERCIAL

## 2019-10-08 VITALS
HEART RATE: 92 BPM | SYSTOLIC BLOOD PRESSURE: 108 MMHG | WEIGHT: 169 LBS | RESPIRATION RATE: 18 BRPM | TEMPERATURE: 98 F | BODY MASS INDEX: 27.16 KG/M2 | HEIGHT: 66 IN | DIASTOLIC BLOOD PRESSURE: 70 MMHG

## 2019-10-08 DIAGNOSIS — Z87.59 HISTORY OF MOLAR PREGNANCY: ICD-10-CM

## 2019-10-08 DIAGNOSIS — Z23 NEED FOR INFLUENZA VACCINATION: ICD-10-CM

## 2019-10-08 DIAGNOSIS — O09.90 HIGH RISK PREGNANCY, ANTEPARTUM: Primary | ICD-10-CM

## 2019-10-08 PROCEDURE — 99207 ZZC OB VISIT-NO CHARGE - GICH ONLY: CPT | Performed by: FAMILY MEDICINE

## 2019-10-08 PROCEDURE — 90471 IMMUNIZATION ADMIN: CPT | Performed by: FAMILY MEDICINE

## 2019-10-08 PROCEDURE — 90686 IIV4 VACC NO PRSV 0.5 ML IM: CPT | Performed by: FAMILY MEDICINE

## 2019-10-08 ASSESSMENT — PAIN SCALES - GENERAL: PAINLEVEL: NO PAIN (0)

## 2019-10-08 ASSESSMENT — MIFFLIN-ST. JEOR: SCORE: 1500.39

## 2019-10-08 NOTE — NURSING NOTE
Patient presents to the clinic today for an ob check. Med rec complete. Jalyn Bullard LPN.................. 10/8/2019 3:35 PM

## 2019-10-08 NOTE — PROGRESS NOTES
Nursing Notes:   Jalyn Bullard LPN  10/8/2019  3:49 PM  Signed  Patient presents to the clinic today for an ob check. Med rec complete. Jalyn Bullard LPN.................. 10/8/2019 3:35 PM      Jenny Hadley is a 28 year old female  at 31w6d   Heartburn not quite as bad.  Occasional uterine tightening.    Flu vaccine today.      GBS at 35-36 weeks.  Hemoglobin (g/dL)   Date Value   2019 11.0 (L)      Lab Results   Component Value Date    ABO A 2019    RH Pos 2019    [id number] A Rh Positive 2016           ICD-10-CM    1. High risk pregnancy, antepartum O09.90    2. History of molar pregnancy Z87.59    3. Need for influenza vaccination Z23 GH-IMM- FLU VAC PRESRV FREE QUAD SPLIT VIR > 6 MONTHS IM       Reviewed signs and symptoms of labor and preeclampsia.  [facility] reviewed.  Continue activity as tolerated.  Reviewed selfcare and end of pregnancy comfort measures.  Discussed infant care, medications and procedures in first 48 hours post-delivery.    PP birth control: none  Reviewed plan to have placenta go to pathology at delivery.  Follow up in 2 weeks      Pia Tong MD

## 2019-10-25 ENCOUNTER — PRENATAL OFFICE VISIT (OUTPATIENT)
Dept: FAMILY MEDICINE | Facility: OTHER | Age: 29
End: 2019-10-25
Attending: FAMILY MEDICINE
Payer: COMMERCIAL

## 2019-10-25 VITALS
WEIGHT: 174 LBS | HEIGHT: 66 IN | BODY MASS INDEX: 27.97 KG/M2 | DIASTOLIC BLOOD PRESSURE: 74 MMHG | TEMPERATURE: 98 F | SYSTOLIC BLOOD PRESSURE: 110 MMHG | RESPIRATION RATE: 18 BRPM | HEART RATE: 84 BPM

## 2019-10-25 DIAGNOSIS — Z87.59 HISTORY OF MOLAR PREGNANCY: ICD-10-CM

## 2019-10-25 DIAGNOSIS — O09.90 HIGH RISK PREGNANCY, ANTEPARTUM: Primary | ICD-10-CM

## 2019-10-25 PROCEDURE — 99207 ZZC OB VISIT-NO CHARGE - GICH ONLY: CPT | Performed by: FAMILY MEDICINE

## 2019-10-25 ASSESSMENT — PAIN SCALES - GENERAL: PAINLEVEL: NO PAIN (0)

## 2019-10-25 ASSESSMENT — MIFFLIN-ST. JEOR: SCORE: 1518.07

## 2019-10-25 NOTE — NURSING NOTE
Patient presents to the clinic today for an ob check, she is 34w2d.  Med rec complete.  Jalyn Bullard LPN.................. 10/25/2019 11:33 AM

## 2019-10-25 NOTE — PROGRESS NOTES
Nursing Notes:   Jayln Bullard LPN  10/25/2019 11:59 AM  Signed  Patient presents to the clinic today for an ob check, she is 34w2d.  Med rec complete.  Jalyn Bullard LPN.................. 10/25/2019 11:33 AM      Jenny Hadley is a 29 year old female  at 34w2d   History of molar pregnancy.  Reviewed need for placenta to go to pathology.  No bleeding or spotting.  Has had some swelling in her hands yesterday.      GBS at 35-36 weeks.  Hemoglobin (g/dL)   Date Value   2019 11.0 (L)   Patient had elevated 1 hour glucose tolerance test.  3-hour test was normal.    Lab Results   Component Value Date    ABO A 2019    RH Pos 2019    [id number] A Rh Positive 2016           ICD-10-CM    1. High risk pregnancy, antepartum O09.90    2. History of molar pregnancy Z87.59        Reviewed signs and symptoms of labor and preeclampsia.  Phone [facility] reviewed.  Continue activity as tolerated.  Reviewed selfcare and end of pregnancy comfort measures.  Discussed infant care, medications and procedures in first 48 hours post-delivery.    PP birth control: None  Follow up in 2 weeks      Pia Tong MD

## 2019-11-04 ENCOUNTER — PRENATAL OFFICE VISIT (OUTPATIENT)
Dept: FAMILY MEDICINE | Facility: OTHER | Age: 29
End: 2019-11-04
Attending: FAMILY MEDICINE
Payer: COMMERCIAL

## 2019-11-04 VITALS
WEIGHT: 179.4 LBS | SYSTOLIC BLOOD PRESSURE: 116 MMHG | TEMPERATURE: 97.9 F | BODY MASS INDEX: 29.4 KG/M2 | OXYGEN SATURATION: 97 % | RESPIRATION RATE: 16 BRPM | DIASTOLIC BLOOD PRESSURE: 76 MMHG | HEART RATE: 95 BPM

## 2019-11-04 DIAGNOSIS — O09.90 HIGH RISK PREGNANCY, ANTEPARTUM: Primary | ICD-10-CM

## 2019-11-04 DIAGNOSIS — Z87.59 HISTORY OF MOLAR PREGNANCY: ICD-10-CM

## 2019-11-04 PROCEDURE — 99207 ZZC OB VISIT-NO CHARGE - GICH ONLY: CPT | Performed by: FAMILY MEDICINE

## 2019-11-04 PROCEDURE — 87081 CULTURE SCREEN ONLY: CPT | Mod: ZL | Performed by: FAMILY MEDICINE

## 2019-11-04 ASSESSMENT — PAIN SCALES - GENERAL: PAINLEVEL: NO PAIN (0)

## 2019-11-04 NOTE — NURSING NOTE
Chief Complaint   Patient presents with     Prenatal Care     35w 5d     No specific questions or concerns at this time. Has had some contractions.     Medication Reconciliation: complete    Ekaterina Oconnell LPN

## 2019-11-06 LAB
BACTERIA SPEC CULT: NORMAL
SPECIMEN SOURCE: NORMAL

## 2019-11-11 NOTE — PROGRESS NOTES
Nursing Notes:   Ekaterina Oconnell LPN  2019  2:31 PM  Signed  Chief Complaint   Patient presents with     Prenatal Care     35w 5d     No specific questions or concerns at this time. Has had some contractions.     Medication Reconciliation: complete    Ekaterina Oconnell LPN     /76 (BP Location: Right arm, Patient Position: Sitting, Cuff Size: Adult Regular)   Pulse 95   Temp 97.9  F (36.6  C) (Tympanic)   Resp 16   Wt 81.4 kg (179 lb 6.4 oz)   SpO2 97%   Breastfeeding? No   BMI 29.40 kg/m      Jenny Hadley is a 29 year old female   at 36w4d    GBS obtained today  Hemoglobin   Date Value Ref Range Status   2019 11.0 (L) 11.7 - 15.7 g/dL Final   ]    Lab Results   Component Value Date    ABO A 2019    RH Pos 2019    [id number] A Rh Positive 2016         ICD-10-CM    1. High risk pregnancy, antepartum O09.90 Rectal/Vag Group B Strep Culture   2. History of molar pregnancy Z87.59      Placenta will need pathology evaluation due to molar pregnancy history.     Reviewed signs andsymptoms of labor and preeclampsia.  [facility].  Continue activity as tolerated.  Reviewed self care and end of pregnancy comfort measures.  PP birth control: none  Follow up in a week.  Pia Tong MD

## 2019-11-14 ENCOUNTER — PRENATAL OFFICE VISIT (OUTPATIENT)
Dept: FAMILY MEDICINE | Facility: OTHER | Age: 29
End: 2019-11-14
Attending: FAMILY MEDICINE
Payer: COMMERCIAL

## 2019-11-14 VITALS
SYSTOLIC BLOOD PRESSURE: 108 MMHG | RESPIRATION RATE: 18 BRPM | TEMPERATURE: 97.8 F | HEART RATE: 92 BPM | DIASTOLIC BLOOD PRESSURE: 64 MMHG | BODY MASS INDEX: 29.58 KG/M2 | WEIGHT: 180.5 LBS

## 2019-11-14 DIAGNOSIS — O09.90 HIGH-RISK PREGNANCY, UNSPECIFIED TRIMESTER: Primary | ICD-10-CM

## 2019-11-14 LAB
ALBUMIN UR-MCNC: NEGATIVE MG/DL
APPEARANCE UR: CLEAR
BACTERIA #/AREA URNS HPF: ABNORMAL /HPF
BILIRUB UR QL STRIP: NEGATIVE
COLOR UR AUTO: YELLOW
GLUCOSE UR STRIP-MCNC: NEGATIVE MG/DL
HGB UR QL STRIP: ABNORMAL
KETONES UR STRIP-MCNC: NEGATIVE MG/DL
LEUKOCYTE ESTERASE UR QL STRIP: ABNORMAL
NITRATE UR QL: NEGATIVE
PH UR STRIP: 6.5 PH (ref 5–9)
RBC #/AREA URNS AUTO: ABNORMAL /HPF
SOURCE: ABNORMAL
SP GR UR STRIP: 1.01 (ref 1–1.03)
UROBILINOGEN UR STRIP-ACNC: 0.2 EU/DL (ref 0.2–1)
WBC #/AREA URNS AUTO: ABNORMAL /HPF

## 2019-11-14 PROCEDURE — 81001 URINALYSIS AUTO W/SCOPE: CPT | Mod: ZL | Performed by: FAMILY MEDICINE

## 2019-11-14 PROCEDURE — 00000219 ZZHCL STATISTIC OBSA - URINALYSIS: Performed by: FAMILY MEDICINE

## 2019-11-14 PROCEDURE — 99207 ZZC OB VISIT-NO CHARGE - GICH ONLY: CPT | Performed by: FAMILY MEDICINE

## 2019-11-14 ASSESSMENT — PAIN SCALES - GENERAL: PAINLEVEL: NO PAIN (0)

## 2019-11-14 NOTE — NURSING NOTE
"Chief Complaint   Patient presents with     Prenatal Care     37w1d       Initial /64   Pulse 92   Temp 97.8  F (36.6  C) (Tympanic)   Resp 18   Wt 81.9 kg (180 lb 8 oz)   BMI 29.58 kg/m   Estimated body mass index is 29.58 kg/m  as calculated from the following:    Height as of 10/25/19: 1.664 m (5' 5.5\").    Weight as of this encounter: 81.9 kg (180 lb 8 oz).  Medication Reconciliation: complete    Marlene Hewitt LPN  "

## 2019-11-15 NOTE — PROGRESS NOTES
Routine OB Visit    S: Feeling well; caring for 3 children at home (one of hers and two others).  + FM.  No bleeding, discharge, fluid leaking.    O: /64   Pulse 92   Temp 97.8  F (36.6  C) (Tympanic)   Resp 18   Wt 81.9 kg (180 lb 8 oz)   BMI 29.58 kg/m    Gen: Well-appearing, NAD  FH: 37  FHR: 134  Cx: 3.5/75/-2  Ext: No edema  Skin: No rash    A/P:  Jenny Hadley is a 29 year old  at 37w2d by 6w6d US, here for return OB visit.  Doing well    PNC: - Prenatal labs normal, Rh +, Rubella +, GCT 3 hr normal.  Genetics: NA  Imaginw US normal  RTC weekly or [facility] sooner with s/s of labor.    Ginny Matias, DO  Family Practice

## 2019-11-19 ENCOUNTER — PRENATAL OFFICE VISIT (OUTPATIENT)
Dept: FAMILY MEDICINE | Facility: OTHER | Age: 29
End: 2019-11-19
Attending: FAMILY MEDICINE
Payer: COMMERCIAL

## 2019-11-19 VITALS
BODY MASS INDEX: 29.41 KG/M2 | HEIGHT: 66 IN | RESPIRATION RATE: 18 BRPM | TEMPERATURE: 98.2 F | WEIGHT: 183 LBS | SYSTOLIC BLOOD PRESSURE: 110 MMHG | DIASTOLIC BLOOD PRESSURE: 70 MMHG | HEART RATE: 84 BPM

## 2019-11-19 DIAGNOSIS — O09.90 HIGH RISK PREGNANCY, ANTEPARTUM: ICD-10-CM

## 2019-11-19 PROCEDURE — 99207 ZZC OB VISIT-NO CHARGE - GICH ONLY: CPT | Performed by: FAMILY MEDICINE

## 2019-11-19 ASSESSMENT — MIFFLIN-ST. JEOR: SCORE: 1558.89

## 2019-11-19 ASSESSMENT — PAIN SCALES - GENERAL: PAINLEVEL: MILD PAIN (3)

## 2019-11-19 NOTE — NURSING NOTE
Patient presents to the clinic today for an check. She is 37w6d.  Med rec complete.  Jalyn Bullard LPN.................. 11/19/2019 2:31 PM

## 2019-11-19 NOTE — PROGRESS NOTES
"Nursing Notes:   Jalyn Bullard LPN  2019  2:32 PM  Signed  Patient presents to the clinic today for an check. She is 37w6d.  Med rec complete.  Jalyn Bullard LPN.................. 2019 2:31 PM   /70   Pulse 84   Temp 98.2  F (36.8  C) (Tympanic)   Resp 18   Ht 1.664 m (5' 5.5\")   Wt 83 kg (183 lb)   Breastfeeding No   BMI 29.99 kg/m      Jenny Hadley is a 29 year old female   at 37w6d    GBS negative   Hemoglobin   Date Value Ref Range Status   2019 11.0 (L) 11.7 - 15.7 g/dL Final   ]    Lab Results   Component Value Date    ABO A 2019    RH Pos 2019    [id number] A Rh Positive 2016         ICD-10-CM    1. High risk pregnancy, antepartum O09.90        Reviewed signs andsymptoms of labor and preeclampsia.  [facility] reviewed.  Continue activity as tolerated.  Reviewed self care and end of pregnancy comfort measures.  PP birth control: none  Follow up in a week.  Pia Tong MD     "

## 2019-11-21 ENCOUNTER — ANESTHESIA (OUTPATIENT)
Dept: OBGYN | Facility: OTHER | Age: 29
End: 2019-11-21
Payer: COMMERCIAL

## 2019-11-21 ENCOUNTER — ANESTHESIA EVENT (OUTPATIENT)
Dept: OBGYN | Facility: OTHER | Age: 29
End: 2019-11-21
Payer: COMMERCIAL

## 2019-11-21 ENCOUNTER — HOSPITAL ENCOUNTER (INPATIENT)
Facility: OTHER | Age: 29
LOS: 3 days | Discharge: HOME | End: 2019-11-24
Attending: FAMILY MEDICINE | Admitting: FAMILY MEDICINE
Payer: COMMERCIAL

## 2019-11-21 PROBLEM — Z36.89 ENCOUNTER FOR TRIAGE IN PREGNANT PATIENT: Status: ACTIVE | Noted: 2019-11-21

## 2019-11-21 LAB
BASOPHILS # BLD AUTO: 0 10E9/L (ref 0–0.2)
BASOPHILS NFR BLD AUTO: 0.2 %
DIFFERENTIAL METHOD BLD: ABNORMAL
EOSINOPHIL # BLD AUTO: 0.1 10E9/L (ref 0–0.7)
EOSINOPHIL NFR BLD AUTO: 0.8 %
ERYTHROCYTE [DISTWIDTH] IN BLOOD BY AUTOMATED COUNT: 13.2 % (ref 10–15)
HCT VFR BLD AUTO: 32.4 % (ref 35–47)
HGB BLD-MCNC: 10.4 G/DL (ref 11.7–15.7)
IMM GRANULOCYTES # BLD: 0 10E9/L (ref 0–0.4)
IMM GRANULOCYTES NFR BLD: 0.3 %
LYMPHOCYTES # BLD AUTO: 2.4 10E9/L (ref 0.8–5.3)
LYMPHOCYTES NFR BLD AUTO: 21.2 %
MCH RBC QN AUTO: 26.7 PG (ref 26.5–33)
MCHC RBC AUTO-ENTMCNC: 32.1 G/DL (ref 31.5–36.5)
MCV RBC AUTO: 83 FL (ref 78–100)
MONOCYTES # BLD AUTO: 1 10E9/L (ref 0–1.3)
MONOCYTES NFR BLD AUTO: 8.4 %
NEUTROPHILS # BLD AUTO: 7.9 10E9/L (ref 1.6–8.3)
NEUTROPHILS NFR BLD AUTO: 69.1 %
PLATELET # BLD AUTO: 274 10E9/L (ref 150–450)
RBC # BLD AUTO: 3.9 10E12/L (ref 3.8–5.2)
WBC # BLD AUTO: 11.5 10E9/L (ref 4–11)

## 2019-11-21 PROCEDURE — 37000011 ZZH ANESTHESIA WARD SERVICE

## 2019-11-21 PROCEDURE — 86901 BLOOD TYPING SEROLOGIC RH(D): CPT | Performed by: FAMILY MEDICINE

## 2019-11-21 PROCEDURE — 59400 OBSTETRICAL CARE: CPT | Performed by: FAMILY MEDICINE

## 2019-11-21 PROCEDURE — 86780 TREPONEMA PALLIDUM: CPT | Performed by: FAMILY MEDICINE

## 2019-11-21 PROCEDURE — 25000125 ZZHC RX 250: Performed by: NURSE ANESTHETIST, CERTIFIED REGISTERED

## 2019-11-21 PROCEDURE — 12000000 ZZH R&B MED SURG/OB

## 2019-11-21 PROCEDURE — 25800030 ZZH RX IP 258 OP 636: Performed by: NURSE ANESTHETIST, CERTIFIED REGISTERED

## 2019-11-21 PROCEDURE — 59400 OBSTETRICAL CARE: CPT | Performed by: NURSE ANESTHETIST, CERTIFIED REGISTERED

## 2019-11-21 PROCEDURE — 0HQ9XZZ REPAIR PERINEUM SKIN, EXTERNAL APPROACH: ICD-10-PCS | Performed by: FAMILY MEDICINE

## 2019-11-21 PROCEDURE — 86850 RBC ANTIBODY SCREEN: CPT | Performed by: FAMILY MEDICINE

## 2019-11-21 PROCEDURE — 25000128 H RX IP 250 OP 636: Performed by: NURSE ANESTHETIST, CERTIFIED REGISTERED

## 2019-11-21 PROCEDURE — 85025 COMPLETE CBC W/AUTO DIFF WBC: CPT | Performed by: FAMILY MEDICINE

## 2019-11-21 PROCEDURE — 36415 COLL VENOUS BLD VENIPUNCTURE: CPT | Performed by: FAMILY MEDICINE

## 2019-11-21 PROCEDURE — G0463 HOSPITAL OUTPT CLINIC VISIT: HCPCS

## 2019-11-21 PROCEDURE — 25000128 H RX IP 250 OP 636: Performed by: FAMILY MEDICINE

## 2019-11-21 PROCEDURE — 86900 BLOOD TYPING SEROLOGIC ABO: CPT | Performed by: FAMILY MEDICINE

## 2019-11-21 RX ORDER — MORPHINE SULFATE 1 MG/ML
INJECTION, SOLUTION EPIDURAL; INTRATHECAL; INTRAVENOUS PRN
Status: DISCONTINUED | OUTPATIENT
Start: 2019-11-21 | End: 2019-11-21

## 2019-11-21 RX ORDER — NALOXONE HYDROCHLORIDE 0.4 MG/ML
.1-.4 INJECTION, SOLUTION INTRAMUSCULAR; INTRAVENOUS; SUBCUTANEOUS
Status: DISCONTINUED | OUTPATIENT
Start: 2019-11-21 | End: 2019-11-22

## 2019-11-21 RX ORDER — PHENYLEPHRINE HCL IN 0.9% NACL 1 MG/10 ML
100 SYRINGE (ML) INTRAVENOUS EVERY 5 MIN PRN
Status: DISCONTINUED | OUTPATIENT
Start: 2019-11-21 | End: 2019-11-24 | Stop reason: HOSPADM

## 2019-11-21 RX ORDER — BUPIVACAINE HYDROCHLORIDE 2.5 MG/ML
10 INJECTION, SOLUTION EPIDURAL; INFILTRATION; INTRACAUDAL ONCE
Status: DISCONTINUED | OUTPATIENT
Start: 2019-11-21 | End: 2019-11-24 | Stop reason: HOSPADM

## 2019-11-21 RX ORDER — METHYLERGONOVINE MALEATE 0.2 MG/ML
200 INJECTION INTRAVENOUS
Status: DISCONTINUED | OUTPATIENT
Start: 2019-11-21 | End: 2019-11-24 | Stop reason: HOSPADM

## 2019-11-21 RX ORDER — OXYTOCIN 10 [USP'U]/ML
10 INJECTION, SOLUTION INTRAMUSCULAR; INTRAVENOUS
Status: DISCONTINUED | OUTPATIENT
Start: 2019-11-21 | End: 2019-11-24 | Stop reason: HOSPADM

## 2019-11-21 RX ORDER — LIDOCAINE HYDROCHLORIDE AND EPINEPHRINE 15; 5 MG/ML; UG/ML
3 INJECTION, SOLUTION EPIDURAL
Status: DISCONTINUED | OUTPATIENT
Start: 2019-11-21 | End: 2019-11-22 | Stop reason: CLARIF

## 2019-11-21 RX ORDER — ACETAMINOPHEN 325 MG/1
650 TABLET ORAL EVERY 4 HOURS PRN
Status: DISCONTINUED | OUTPATIENT
Start: 2019-11-21 | End: 2019-11-22

## 2019-11-21 RX ORDER — FENTANYL CITRATE 50 UG/ML
INJECTION, SOLUTION INTRAMUSCULAR; INTRAVENOUS PRN
Status: DISCONTINUED | OUTPATIENT
Start: 2019-11-21 | End: 2019-11-21

## 2019-11-21 RX ORDER — ONDANSETRON 2 MG/ML
4 INJECTION INTRAMUSCULAR; INTRAVENOUS EVERY 6 HOURS PRN
Status: DISCONTINUED | OUTPATIENT
Start: 2019-11-21 | End: 2019-11-21

## 2019-11-21 RX ORDER — ONDANSETRON 4 MG/1
4 TABLET, ORALLY DISINTEGRATING ORAL EVERY 6 HOURS PRN
Status: DISCONTINUED | OUTPATIENT
Start: 2019-11-21 | End: 2019-11-24 | Stop reason: HOSPADM

## 2019-11-21 RX ORDER — IBUPROFEN 400 MG/1
800 TABLET, FILM COATED ORAL
Status: DISCONTINUED | OUTPATIENT
Start: 2019-11-21 | End: 2019-11-22

## 2019-11-21 RX ORDER — ONDANSETRON 2 MG/ML
4 INJECTION INTRAMUSCULAR; INTRAVENOUS EVERY 6 HOURS PRN
Status: DISCONTINUED | OUTPATIENT
Start: 2019-11-21 | End: 2019-11-24 | Stop reason: HOSPADM

## 2019-11-21 RX ORDER — NALBUPHINE HYDROCHLORIDE 10 MG/ML
2.5-5 INJECTION, SOLUTION INTRAMUSCULAR; INTRAVENOUS; SUBCUTANEOUS EVERY 6 HOURS PRN
Status: DISCONTINUED | OUTPATIENT
Start: 2019-11-21 | End: 2019-11-22

## 2019-11-21 RX ORDER — NALBUPHINE HYDROCHLORIDE 10 MG/ML
2.5-5 INJECTION, SOLUTION INTRAMUSCULAR; INTRAVENOUS; SUBCUTANEOUS EVERY 6 HOURS PRN
Status: DISCONTINUED | OUTPATIENT
Start: 2019-11-21 | End: 2019-11-24 | Stop reason: HOSPADM

## 2019-11-21 RX ORDER — NALOXONE HYDROCHLORIDE 0.4 MG/ML
.1-.4 INJECTION, SOLUTION INTRAMUSCULAR; INTRAVENOUS; SUBCUTANEOUS
Status: DISCONTINUED | OUTPATIENT
Start: 2019-11-21 | End: 2019-11-21

## 2019-11-21 RX ORDER — PHENYLEPHRINE HCL IN 0.9% NACL 1 MG/10 ML
100 SYRINGE (ML) INTRAVENOUS EVERY 5 MIN PRN
Status: DISCONTINUED | OUTPATIENT
Start: 2019-11-21 | End: 2019-11-22

## 2019-11-21 RX ORDER — BUPIVACAINE HYDROCHLORIDE 7.5 MG/ML
INJECTION, SOLUTION INTRASPINAL PRN
Status: DISCONTINUED | OUTPATIENT
Start: 2019-11-21 | End: 2019-11-21

## 2019-11-21 RX ORDER — LIDOCAINE 40 MG/G
CREAM TOPICAL
Status: DISCONTINUED | OUTPATIENT
Start: 2019-11-21 | End: 2019-11-24 | Stop reason: HOSPADM

## 2019-11-21 RX ORDER — CARBOPROST TROMETHAMINE 250 UG/ML
250 INJECTION, SOLUTION INTRAMUSCULAR
Status: DISCONTINUED | OUTPATIENT
Start: 2019-11-21 | End: 2019-11-24 | Stop reason: HOSPADM

## 2019-11-21 RX ORDER — SODIUM CHLORIDE, SODIUM LACTATE, POTASSIUM CHLORIDE, CALCIUM CHLORIDE 600; 310; 30; 20 MG/100ML; MG/100ML; MG/100ML; MG/100ML
INJECTION, SOLUTION INTRAVENOUS CONTINUOUS
Status: DISCONTINUED | OUTPATIENT
Start: 2019-11-21 | End: 2019-11-24 | Stop reason: HOSPADM

## 2019-11-21 RX ORDER — OXYCODONE AND ACETAMINOPHEN 5; 325 MG/1; MG/1
1 TABLET ORAL
Status: DISCONTINUED | OUTPATIENT
Start: 2019-11-21 | End: 2019-11-24 | Stop reason: HOSPADM

## 2019-11-21 RX ORDER — LIDOCAINE HYDROCHLORIDE 10 MG/ML
INJECTION, SOLUTION INFILTRATION; PERINEURAL PRN
Status: DISCONTINUED | OUTPATIENT
Start: 2019-11-21 | End: 2019-11-21

## 2019-11-21 RX ORDER — FENTANYL CITRATE 50 UG/ML
100 INJECTION, SOLUTION INTRAMUSCULAR; INTRAVENOUS
Status: DISCONTINUED | OUTPATIENT
Start: 2019-11-21 | End: 2019-11-24 | Stop reason: HOSPADM

## 2019-11-21 RX ADMIN — SODIUM CHLORIDE, POTASSIUM CHLORIDE, SODIUM LACTATE AND CALCIUM CHLORIDE 1000 ML: 600; 310; 30; 20 INJECTION, SOLUTION INTRAVENOUS at 23:29

## 2019-11-21 RX ADMIN — LIDOCAINE HYDROCHLORIDE 30 MG: 10 INJECTION, SOLUTION INFILTRATION; PERINEURAL at 23:42

## 2019-11-21 RX ADMIN — FENTANYL CITRATE 25 MCG: 50 INJECTION, SOLUTION INTRAMUSCULAR; INTRAVENOUS at 23:42

## 2019-11-21 RX ADMIN — BUPIVACAINE HYDROCHLORIDE IN DEXTROSE 0.6 ML: 7.5 INJECTION, SOLUTION SUBARACHNOID at 23:42

## 2019-11-21 RX ADMIN — FENTANYL CITRATE 100 MCG: 50 INJECTION, SOLUTION INTRAMUSCULAR; INTRAVENOUS at 23:07

## 2019-11-21 RX ADMIN — MORPHINE SULFATE 0.1 MG: 1 INJECTION, SOLUTION EPIDURAL; INTRATHECAL; INTRAVENOUS at 23:42

## 2019-11-22 LAB
ABO + RH BLD: NORMAL
ABO + RH BLD: NORMAL
BLD GP AB SCN SERPL QL: NORMAL
BLOOD BANK CMNT PATIENT-IMP: NORMAL
SPECIMEN EXP DATE BLD: NORMAL

## 2019-11-22 PROCEDURE — 72200001 ZZH LABOR CARE VAGINAL DELIVERY SINGLE

## 2019-11-22 PROCEDURE — 12000000 ZZH R&B MED SURG/OB

## 2019-11-22 PROCEDURE — 88307 TISSUE EXAM BY PATHOLOGIST: CPT

## 2019-11-22 PROCEDURE — 25000125 ZZHC RX 250: Performed by: FAMILY MEDICINE

## 2019-11-22 PROCEDURE — 99207 ZZC NO CHARGE LOS: CPT | Performed by: FAMILY MEDICINE

## 2019-11-22 PROCEDURE — 25000132 ZZH RX MED GY IP 250 OP 250 PS 637: Performed by: FAMILY MEDICINE

## 2019-11-22 RX ORDER — AMOXICILLIN 250 MG
2 CAPSULE ORAL 2 TIMES DAILY
Status: DISCONTINUED | OUTPATIENT
Start: 2019-11-22 | End: 2019-11-24 | Stop reason: HOSPADM

## 2019-11-22 RX ORDER — AMOXICILLIN 250 MG
1 CAPSULE ORAL 2 TIMES DAILY
Status: DISCONTINUED | OUTPATIENT
Start: 2019-11-22 | End: 2019-11-24 | Stop reason: HOSPADM

## 2019-11-22 RX ORDER — OXYTOCIN 10 [USP'U]/ML
10 INJECTION, SOLUTION INTRAMUSCULAR; INTRAVENOUS
Status: DISCONTINUED | OUTPATIENT
Start: 2019-11-22 | End: 2019-11-24 | Stop reason: HOSPADM

## 2019-11-22 RX ORDER — IBUPROFEN 400 MG/1
800 TABLET, FILM COATED ORAL EVERY 6 HOURS PRN
Status: DISCONTINUED | OUTPATIENT
Start: 2019-11-22 | End: 2019-11-24 | Stop reason: HOSPADM

## 2019-11-22 RX ORDER — ACETAMINOPHEN 325 MG/1
650 TABLET ORAL EVERY 4 HOURS PRN
Status: DISCONTINUED | OUTPATIENT
Start: 2019-11-22 | End: 2019-11-24 | Stop reason: HOSPADM

## 2019-11-22 RX ORDER — HYDROCORTISONE 2.5 %
CREAM (GRAM) TOPICAL 3 TIMES DAILY PRN
Status: DISCONTINUED | OUTPATIENT
Start: 2019-11-22 | End: 2019-11-24 | Stop reason: HOSPADM

## 2019-11-22 RX ORDER — LANOLIN 100 %
OINTMENT (GRAM) TOPICAL
Status: DISCONTINUED | OUTPATIENT
Start: 2019-11-22 | End: 2019-11-24 | Stop reason: HOSPADM

## 2019-11-22 RX ORDER — BISACODYL 10 MG
10 SUPPOSITORY, RECTAL RECTAL DAILY PRN
Status: DISCONTINUED | OUTPATIENT
Start: 2019-11-24 | End: 2019-11-24 | Stop reason: HOSPADM

## 2019-11-22 RX ORDER — NALOXONE HYDROCHLORIDE 0.4 MG/ML
.1-.4 INJECTION, SOLUTION INTRAMUSCULAR; INTRAVENOUS; SUBCUTANEOUS
Status: DISCONTINUED | OUTPATIENT
Start: 2019-11-22 | End: 2019-11-24 | Stop reason: HOSPADM

## 2019-11-22 RX ADMIN — SENNOSIDES AND DOCUSATE SODIUM 1 TABLET: 8.6; 5 TABLET ORAL at 10:13

## 2019-11-22 RX ADMIN — ACETAMINOPHEN 650 MG: 325 TABLET, FILM COATED ORAL at 15:09

## 2019-11-22 RX ADMIN — IBUPROFEN 800 MG: 400 TABLET ORAL at 20:00

## 2019-11-22 RX ADMIN — Medication 100 ML/HR: at 02:00

## 2019-11-22 RX ADMIN — IBUPROFEN 800 MG: 400 TABLET ORAL at 02:39

## 2019-11-22 RX ADMIN — SENNOSIDES AND DOCUSATE SODIUM 2 TABLET: 8.6; 5 TABLET ORAL at 21:23

## 2019-11-22 RX ADMIN — ACETAMINOPHEN 650 MG: 325 TABLET, FILM COATED ORAL at 10:13

## 2019-11-22 RX ADMIN — IBUPROFEN 800 MG: 400 TABLET ORAL at 13:28

## 2019-11-22 NOTE — H&P
OB ADMISSION NOTE    CHIEF COMPLAINT:  SROM    OBSTETRICAL / DATING HISTORY:  Estimated Date of Delivery: Dec 4, 2019  Gestational Age:  38w1d    OB History    Para Term  AB Living   3 1 1 0 1 1   SAB TAB Ectopic Multiple Live Births   0 0 0 0 1      # Outcome Date GA Lbr Juanito/2nd Weight Sex Delivery Anes PTL Lv   3 Current            2 Molar 18           1 Term 16 40w0d  3.487 kg (7 lb 11 oz) M  EPI N FARHAN      Name: Hira        TESTING:  Hemoglobin   Date Value Ref Range Status   2019 10.4 (L) 11.7 - 15.7 g/dL Final   ]  Lab Results   Component Value Date    ABO A 2019    RH Pos 2019    [id number] A Rh Positive 2016           PAST MEDICAL HISTORY:  Past Medical History:   Diagnosis Date     High risk pregnancy, antepartum 2019     History of prior pregnancies 2019    , EDC      Molar pregnancy 2018        PAST SURGICAL HISTORY:  Past Surgical History:   Procedure Laterality Date     DENTAL SURGERY      wisdom teeth     DILATION AND CURETTAGE SUCTION N/A 2018    Procedure: DILATION AND CURETTAGE SUCTION;  Suction Dilation & Curettage;  Surgeon: Wily Mars MD;  Location:  OR        ALLERGIES:     Allergies   Allergen Reactions     Cats Itching and Shortness Of Breath          reports that she has never smoked. She has never used smokeless tobacco. She reports current alcohol use. She reports that she does not use drugs.    PREGNANCY RISK FACTORS:  History of molar pregnancy    HISTORY OF PRESENT ILLNESS:    (Please see scanned  sheets for prenatal history. Examination at the time of admission revealed no interval change in the patient s history or physical exam except as described below.)    Jenny Hadley is a 29 year old female  at 38w1d who presents with SROM tonight.  Very uncomfortable    REVIEW OF SYSTEMS:  Review Of Systems  unable      PHYSICAL EXAM:   Physical Exam uncomfortable  Cervix 7cm  per RN    Membrane Status:  ruptured  Fetal Presentation:vertex   Morgan Score:  Not applicable     Hemoglobin   Date Value Ref Range Status   2019 10.4 (L) 11.7 - 15.7 g/dL Final   ]  Lab Results   Component Value Date    ABO A 2019    RH Pos 2019    [id number] A Rh Positive 2016           Fetal monitoring: low risk  Contractions:  Every 2  Baseline: 140 BPM  Accels: present  Variability:  moderate  Decels:   none    EFW:  3400 gms  Pelvicassessment for adequacy: yes     Impression:  1.  Jenny Hadley is a 29 year old female  38w1d  - active labor, srom  2.  Category 1 tracing.  3.  History of molar pregnancy   Plan:  1.EFM, [place], IV started, IT being placed  2.  Placenta to go to pathology     Pia Tong MD

## 2019-11-22 NOTE — L&D DELIVERY NOTE
OB Vaginal Delivery Note    Jenny Hadley MRN# [id number]   Age: 29 year old YOB: 1990       GA: 38w2d  GP:   Labor Complications: None   EBL: 75  mL  Delivery QBL:    Delivery Type: Vaginal, Spontaneous   ROM to Delivery Time: rupture date or rupture time have not been documented   Weight:      1 Minute 5 Minute 10 Minute   Apgar Totals:    9    9             Delivery Details:  Jenny Hadley, a 29 year old  female delivered a viable infant with apgars of 9    and 9   . Patient was fully dilated and pushing after    1 hours    30 minutes in active labor. Delivery was via vaginal, spontaneous  to a sterile field under    anesthesia. Infant delivered in vertex  middle  occiput  anterior  position. Anterior and posterior shoulders delivered without difficulty. The cord was clamped, cut twice and    normal abnormalities were noted. Cord blood was obtained in routine fashion with the following disposition:   .      Cord complications:     Placenta delivered at   . Placental disposition was Pathology . Fundal massage performed and fundus found to be firm.     Episiotomy: none    Perineum, vagina, cervix were inspected, and the following lacerations were noted:   Perineal lacerations: 1st                     Any lacerations were repaired in the usual fashion using 3-0 vicryl .    Excellent hemostasis was noted. Needle count correct. Infant and patient in delivery room in good and stable condition.        Delivery/Placenta Date and Time    Delivery Date:  19 Delivery Time:  11:54 AM      Labor Events and Shoulder Dystocia    Fetal Tracing Prior to Delivery:  Category 1  Shoulder dystocia present?:  Neg             Delivery (Maternal) (Provider to Complete) ([id number])    Episiotomy:  None  Perineal lacerations:  1st Repaired?:  Yes   Est. blood loss (mL):  75     Blood Loss  Mother: Jenny Hadley #[id number]   Start of Mother's Information    IO Blood Loss   11/20/19 2354 - 11/22/19 0034    EBL (mL) Hospital Encounter 75 mL    Total  75 mL         End of Mother's Information  Mother: Jenny Hadley #[id number]         Delivery - Provider to Complete ([id number])    Delivering clinician:  Pia Rangel MD  Attempted Delivery Types (Choose all that apply):  Spontaneous Vaginal Delivery  Delivery Type (Choose the 1 that will go to the Birth History):  Vaginal, Spontaneous          Placenta    Delayed Cord Clamping:  Done  Removal:  Spontaneous  Disposition:  Pathology     Presentation and Position    Presentation:  Vertex  Position:  Middle Occiput Anterior           PIA BAILEY MD

## 2019-11-22 NOTE — ANESTHESIA POSTPROCEDURE EVALUATION
Patient: Jenny Hadley    * No procedures listed *    Diagnosis:* No pre-op diagnosis entered *  Diagnosis Additional Information: No value filed.    Anesthesia Type:  Spinal    Note:  Anesthesia Post Evaluation    Patient location during evaluation: Bedside  Patient participation: Able to fully participate in evaluation  Level of consciousness: awake and alert  Pain management: adequate  Airway patency: patent  Cardiovascular status: acceptable  Respiratory status: acceptable  Hydration status: acceptable  PONV: none     Anesthetic complications: None    Comments: Patient was happy with her intrathecal.  She has no residual numbness or tingling in her lower extremities.  She has been up walking around.  She has voided and reports no fever or chills.          Last vitals:  Vitals:    11/22/19 0118 11/22/19 1015 11/22/19 1509   BP: 117/59 124/66 107/66   Pulse:   86   Resp:  18 18   Temp: 96.4  F (35.8  C) 98.2  F (36.8  C) 98.3  F (36.8  C)   SpO2:   98%         Electronically Signed By: ANDREA SHARP CRNA  November 22, 2019  3:29 PM

## 2019-11-22 NOTE — ANESTHESIA PROCEDURE NOTES
Peripheral nerve/Neuraxial procedure note : intrathecal  Pre-Procedure  Performed by  Brice Kaiser APRN CRNA   Location: OB    Procedure Times:11/21/2019 11:38 PM and 11/21/2019 11:43 PM  Pre-Anesthestic Checklist: patient identified, IV checked, site marked, risks and benefits discussed, informed consent, monitors and equipment checked, pre-op evaluation and at physician/surgeon's request    Timeout  Correct Patient: Yes   Correct Procedure: Yes   Correct Site: Yes     Correct Position: Yes     .   Procedure Documentation  ASA 2  Diagnosis:Labor Pain.    Procedure: intrathecal, .   Patient Position:sitting Insertion Site:L3-4  (midline approach)     Patient Prep/Sterile Barriers; chlorhexidine gluconate and isopropyl alcohol.  .  Needle:  Spinal Needle (gauge): 25  Spinal/LP Needle Length (inches): 3.5 # of attempts: 1 and # of redirects:  Introducer used Introducer: 20 G .        Assessment/Narrative  Paresthesias: No.  .  .  clear CSF fluid removed . Time Injected: 23:43  Comments:  Pt tolerated well. No complications encountered.  No pain on injection.

## 2019-11-22 NOTE — LACTATION NOTE
This note was copied from a baby's chart.  INPATIENT LACTATION CONSULT      Consult with Jenny and carey regarding breastfeeding.  Obvious rooting with a strong latch observed this feeding session.  Rhythmic and aggressive suckling also noted.  Instructed Jenny on correct positioning and technique when latching babe on.  Jenny is independent with latching babe onto breast.  Minimal assistance required.  Encouraged Jenny on the importance of frequent feedings throughout the day (at least 8-12 feedings in a 24 hour period) and skin to skin contact.  Jenny demonstrated and states she understands all information given.    Kasandra Nieves RN, IBCLC  Lactation Consultant  [facility] and [facility]

## 2019-11-22 NOTE — PROGRESS NOTES
Patient is a 29 year old  who presents at 38w1d with complaint of contractions 1 minute apart and rupture of membranes at 2230. To room 406. EUM/EFM applied. SVE 5cm, 90%, -2. Will notify MD of patient arrival and status.

## 2019-11-22 NOTE — PROGRESS NOTES
Jenny Hadley  MRN: [id number]  Gestational Age: 38w1d      S:Delivery  B:Spontaneous Labor,38w1d  - Precipitous delivery  No results found for: GBS with antibiotic treatment not indicated 4 hours prior to delivery.  A: Patient delivered   lac 1st degree with  Dr. Tong  in attendance and baby placed on mother's abdomen for delayed cord clamping. Baby dried and stimulated. Baby placed  skin to skin @ 2357..  Apgars 9/9.  IV infusion of Oxytocin  infused. Placenta removal spontaneous. See Flowsheet for VS and PP checks. Labor care plan goals met, transition now to postpartum care.  R: Expect routine postpartum care.

## 2019-11-22 NOTE — PROGRESS NOTES
[facility]  Obstetrics Post-Partum Progress Note          Assessment and Plan:    Assessment:   Post-partum day #1  Normal spontaneous vaginal delivery  History of molar pregnancy  L&D complications: None      Doing well.  No excessive bleeding  Pain well-controlled.      Plan:   Ambulation encouraged  Breast feeding strategies discussed  Placenta sent to pathology           Interval History:   Doing well.  Pain is adequately controlled.  No fevers.  No history of foul-smelling vaginal discharge.  Good appetite.  Denies chest pain, shortness of breath, nausea or vomiting.  Vaginal bleeding is similar to a heavy menstrual flow.  Breastfeeding well.          Significant Problems:      Patient Active Problem List   Diagnosis     History of molar pregnancy     High risk pregnancy, antepartum     Encounter for triage in pregnant patient     Normal labor and delivery     Vaginal delivery             Review of Systems:    The patient denies any chest pain, shortness of breath, excessive pain, fever, chills, purulent drainage from the wound, nausea or vomiting.          Medications:   All medications related to the patient's surgery have been reviewed          Physical Exam:   All vitals stable /66   Temp 98.2  F (36.8  C) (Tympanic)   Resp 18   SpO2 99%   Breastfeeding Unknown    Uterine fundus is firm, non-tender and at the level of the umbilicus          Data:     Hemoglobin   Date Value Ref Range Status   11/21/2019 10.4 (L) 11.7 - 15.7 g/dL Final   ]      GEMMA BAILEY MD

## 2019-11-22 NOTE — ANESTHESIA PREPROCEDURE EVALUATION
Anesthesia Pre-Procedure Evaluation    Patient: Jenny Hadley   MRN: [id number] : 1990          Preoperative Diagnosis: * No pre-op diagnosis entered *    * No procedures listed *    Past Medical History:   Diagnosis Date     High risk pregnancy, antepartum 2019     History of prior pregnancies     , EDC      Molar pregnancy 2018     Past Surgical History:   Procedure Laterality Date     DENTAL SURGERY      wisdom teeth     DILATION AND CURETTAGE SUCTION N/A 2018    Procedure: DILATION AND CURETTAGE SUCTION;  Suction Dilation & Curettage;  Surgeon: Wily Mars MD;  Location:  OR       Anesthesia Evaluation     . Pt has had prior anesthetic.     No history of anesthetic complications          ROS/MED HX    ENT/Pulmonary:  - neg pulmonary ROS     Neurologic:  - neg neurologic ROS     Cardiovascular:  - neg cardiovascular ROS       METS/Exercise Tolerance:     Hematologic:  - neg hematologic  ROS       Musculoskeletal:  - neg musculoskeletal ROS       GI/Hepatic:     (+) GERD       Renal/Genitourinary:  - ROS Renal section negative       Endo:  - neg endo ROS       Psychiatric:  - neg psychiatric ROS       Infectious Disease:  - neg infectious disease ROS       Malignancy:      - no malignancy   Other:    - neg other ROS                 neg OB ROS            Physical Exam  Normal systems: cardiovascular, pulmonary and dental    Airway   Mallampati: I  TM distance: > 3 FB  Neck ROM: full  Mouth opening: > 3 cm    Dental     Cardiovascular       Pulmonary             Lab Results   Component Value Date    WBC 11.5 (H) 2019    HGB 10.4 (L) 2019    HCT 32.4 (L) 2019     2019     2018    POTASSIUM 3.9 2018    CHLORIDE 105 2018    CO2 24 2018    BUN 8 2018    CR 0.51 (L) 2018    GLC 92 2018    GRETA 9.0 2018    ALBUMIN 3.7 2018    PROTTOTAL 6.4 2018    ALT 25 2018    AST 14  "05/13/2018    ALKPHOS 41 05/13/2018    BILITOTAL 1.0 05/13/2018    LIPASE 23 05/13/2018    HCG Positive (A) 04/01/2019       Preop Vitals  BP Readings from Last 3 Encounters:   11/19/19 110/70   11/14/19 108/64   11/04/19 116/76    Pulse Readings from Last 3 Encounters:   11/19/19 84   11/14/19 92   11/04/19 95      Resp Readings from Last 3 Encounters:   11/19/19 18   11/14/19 18   11/04/19 16    SpO2 Readings from Last 3 Encounters:   11/04/19 97%   05/16/18 100%   05/13/18 99%      Temp Readings from Last 1 Encounters:   11/19/19 98.2  F (36.8  C) (Tympanic)    Ht Readings from Last 1 Encounters:   11/19/19 1.664 m (5' 5.5\")      Wt Readings from Last 1 Encounters:   11/19/19 83 kg (183 lb)    Estimated body mass index is 29.99 kg/m  as calculated from the following:    Height as of 11/19/19: 1.664 m (5' 5.5\").    Weight as of 11/19/19: 83 kg (183 lb).       Anesthesia Plan      History & Physical Review      ASA Status:  2 .  OB Epidural Asa: 2   NPO Status:  > 8 hours    Plan for Spinal (ITN for Labor Pain)          Postoperative Care      Consents  Anesthetic plan, risks, benefits and alternatives discussed with:  Patient and Patient..                 ANDREA Clement CRNA  "

## 2019-11-23 LAB
BASOPHILS # BLD AUTO: 0 10E9/L (ref 0–0.2)
BASOPHILS NFR BLD AUTO: 0.1 %
CREAT SERPL-MCNC: 0.53 MG/DL (ref 0.6–1.2)
DIFFERENTIAL METHOD BLD: ABNORMAL
EOSINOPHIL # BLD AUTO: 0 10E9/L (ref 0–0.7)
EOSINOPHIL NFR BLD AUTO: 0.1 %
ERYTHROCYTE [DISTWIDTH] IN BLOOD BY AUTOMATED COUNT: 13.4 % (ref 10–15)
GFR SERPL CREATININE-BSD FRML MDRD: >90 ML/MIN/{1.73_M2}
HCT VFR BLD AUTO: 32.5 % (ref 35–47)
HGB BLD-MCNC: 10.4 G/DL (ref 11.7–15.7)
IMM GRANULOCYTES # BLD: 0.1 10E9/L (ref 0–0.4)
IMM GRANULOCYTES NFR BLD: 0.4 %
LYMPHOCYTES # BLD AUTO: 0.5 10E9/L (ref 0.8–5.3)
LYMPHOCYTES NFR BLD AUTO: 3.2 %
MCH RBC QN AUTO: 26.7 PG (ref 26.5–33)
MCHC RBC AUTO-ENTMCNC: 32 G/DL (ref 31.5–36.5)
MCV RBC AUTO: 84 FL (ref 78–100)
MONOCYTES # BLD AUTO: 0.7 10E9/L (ref 0–1.3)
MONOCYTES NFR BLD AUTO: 4.1 %
NEUTROPHILS # BLD AUTO: 14.9 10E9/L (ref 1.6–8.3)
NEUTROPHILS NFR BLD AUTO: 92.1 %
PLATELET # BLD AUTO: 251 10E9/L (ref 150–450)
RBC # BLD AUTO: 3.89 10E12/L (ref 3.8–5.2)
T PALLIDUM AB SER QL: NONREACTIVE
WBC # BLD AUTO: 16.2 10E9/L (ref 4–11)

## 2019-11-23 PROCEDURE — 25000132 ZZH RX MED GY IP 250 OP 250 PS 637: Performed by: FAMILY MEDICINE

## 2019-11-23 PROCEDURE — 25000128 H RX IP 250 OP 636: Performed by: OBSTETRICS & GYNECOLOGY

## 2019-11-23 PROCEDURE — 25000125 ZZHC RX 250: Performed by: OBSTETRICS & GYNECOLOGY

## 2019-11-23 PROCEDURE — 85025 COMPLETE CBC W/AUTO DIFF WBC: CPT | Performed by: OBSTETRICS & GYNECOLOGY

## 2019-11-23 PROCEDURE — 87040 BLOOD CULTURE FOR BACTERIA: CPT | Performed by: OBSTETRICS & GYNECOLOGY

## 2019-11-23 PROCEDURE — 82565 ASSAY OF CREATININE: CPT | Performed by: OBSTETRICS & GYNECOLOGY

## 2019-11-23 PROCEDURE — 36415 COLL VENOUS BLD VENIPUNCTURE: CPT | Performed by: OBSTETRICS & GYNECOLOGY

## 2019-11-23 PROCEDURE — 12000000 ZZH R&B MED SURG/OB

## 2019-11-23 PROCEDURE — 87086 URINE CULTURE/COLONY COUNT: CPT | Performed by: OBSTETRICS & GYNECOLOGY

## 2019-11-23 PROCEDURE — 81001 URINALYSIS AUTO W/SCOPE: CPT | Performed by: FAMILY MEDICINE

## 2019-11-23 PROCEDURE — 99207 ZZC NO CHARGE LOS: CPT | Performed by: OBSTETRICS & GYNECOLOGY

## 2019-11-23 PROCEDURE — 25800030 ZZH RX IP 258 OP 636: Performed by: OBSTETRICS & GYNECOLOGY

## 2019-11-23 RX ORDER — CLINDAMYCIN PHOSPHATE 900 MG/50ML
900 INJECTION, SOLUTION INTRAVENOUS EVERY 8 HOURS
Status: DISCONTINUED | OUTPATIENT
Start: 2019-11-23 | End: 2019-11-24 | Stop reason: HOSPADM

## 2019-11-23 RX ORDER — GENTAMICIN SULFATE 80 MG/100ML
80 INJECTION, SOLUTION INTRAVENOUS EVERY 8 HOURS
Status: DISCONTINUED | OUTPATIENT
Start: 2019-11-23 | End: 2019-11-23

## 2019-11-23 RX ORDER — AMPICILLIN 1 G/1
1 INJECTION, POWDER, FOR SOLUTION INTRAMUSCULAR; INTRAVENOUS EVERY 6 HOURS
Status: DISCONTINUED | OUTPATIENT
Start: 2019-11-23 | End: 2019-11-24 | Stop reason: HOSPADM

## 2019-11-23 RX ADMIN — ACETAMINOPHEN 650 MG: 325 TABLET, FILM COATED ORAL at 22:49

## 2019-11-23 RX ADMIN — SENNOSIDES AND DOCUSATE SODIUM 2 TABLET: 8.6; 5 TABLET ORAL at 11:06

## 2019-11-23 RX ADMIN — IBUPROFEN 800 MG: 400 TABLET ORAL at 03:15

## 2019-11-23 RX ADMIN — CLINDAMYCIN PHOSPHATE 900 MG: 900 INJECTION, SOLUTION INTRAVENOUS at 19:46

## 2019-11-23 RX ADMIN — AMPICILLIN SODIUM 1 G: 1 INJECTION, POWDER, FOR SOLUTION INTRAMUSCULAR; INTRAVENOUS at 17:05

## 2019-11-23 RX ADMIN — CLINDAMYCIN PHOSPHATE 900 MG: 900 INJECTION, SOLUTION INTRAVENOUS at 11:33

## 2019-11-23 RX ADMIN — ACETAMINOPHEN 650 MG: 325 TABLET, FILM COATED ORAL at 12:43

## 2019-11-23 RX ADMIN — IBUPROFEN 800 MG: 400 TABLET ORAL at 09:58

## 2019-11-23 RX ADMIN — ACETAMINOPHEN 650 MG: 325 TABLET, FILM COATED ORAL at 18:23

## 2019-11-23 RX ADMIN — ACETAMINOPHEN 650 MG: 325 TABLET, FILM COATED ORAL at 08:18

## 2019-11-23 RX ADMIN — IBUPROFEN 800 MG: 400 TABLET ORAL at 16:34

## 2019-11-23 RX ADMIN — ACETAMINOPHEN 650 MG: 325 TABLET, FILM COATED ORAL at 00:46

## 2019-11-23 RX ADMIN — AMPICILLIN SODIUM 1 G: 1 INJECTION, POWDER, FOR SOLUTION INTRAMUSCULAR; INTRAVENOUS at 10:54

## 2019-11-23 RX ADMIN — SENNOSIDES AND DOCUSATE SODIUM 1 TABLET: 8.6; 5 TABLET ORAL at 22:49

## 2019-11-23 RX ADMIN — GENTAMICIN SULFATE 340 MG: 40 INJECTION, SOLUTION INTRAMUSCULAR; INTRAVENOUS at 12:36

## 2019-11-23 RX ADMIN — AMPICILLIN SODIUM 1 G: 1 INJECTION, POWDER, FOR SOLUTION INTRAMUSCULAR; INTRAVENOUS at 22:46

## 2019-11-23 NOTE — PROGRESS NOTES
MD notified that patient has concerns of fever/chills and generalized aching, as well as lower abdominal pain. Temperature of 102.8 noted. MD orders for CBC, blood cultures, and urine sample. All collected. Administered tylenol and ibuprofen for pain control and as fever reducer. Will continue to monitor.   David Ambrocio RN 11/23/19 0830 AM      WBC count returned at 16.2. IV antibiotics initiated.  David Ambrocio RN 11/23/19 11:13 AM      Fever improving with altering tylenol and ibuprofen. Patient is feeling less achy, however reports a dry mouth. Educated her on the importance of continuing to drink fluids.   David Ambrocio RN 11/23/19 2:33 PM

## 2019-11-23 NOTE — PROGRESS NOTES
[facility]    Post-Partum Progress Note    Assessment & Plan   Assessment:  Post-partum day #2  Normal spontaneous vaginal delivery    Fever and feeling ill overnight. She has a scratchy throat and admits to being around children with cough and fever a few days prior to delivery.    Plan:  Pain control measures as needed  Cbc, urine culture and blood culture for elevated temp.    Interval History   Doing well.  Pain is adequately controlled. No history of foul-smelling vaginal discharge.  Good appetite.  Denies chest pain, shortness of breath, nausea or vomiting.  Vaginal bleeding is similar to a heavy menstrual flow.  Breastfeeding well.  Fever and malaise overnight up to 102 deg F    Medications     lactated ringers       - MEDICATION INSTRUCTIONS -       - MEDICATION INSTRUCTIONS -       - MEDICATION INSTRUCTIONS -       - MEDICATION INSTRUCTIONS -       NO Rho (D) immune globulin (RhoGam) needed - mother Rh POSITIVE       - MEDICATION INSTRUCTIONS -       - MEDICATION INSTRUCTIONS -       oxytocin in 0.9% NaCl 100 mL/hr (11/22/19 0200)     oxytocin in 0.9% NaCl       oxytocin in 0.9% NaCl         bupivacaine HCl (PF)  10 mL EPIDURAL Once     - MEDICATION INSTRUCTIONS -   Does not apply See Admin Instructions    Or     - MEDICATION INSTRUCTIONS -   Does not apply See Admin Instructions    Or     - MEDICATION INSTRUCTIONS -   Does not apply See Admin Instructions    Or     - MEDICATION INSTRUCTIONS -   Does not apply See Admin Instructions     senna-docusate  1 tablet Oral BID    Or     senna-docusate  2 tablet Oral BID       Physical Exam   Temp: 102.8  F (39.3  C) Temp src: Tympanic BP: 121/68 Pulse: 100   Resp: 18 SpO2: 98 % O2 Device: None (Room air)    There were no vitals filed for this visit.  Vital Signs with Ranges  Temp:  [98.2  F (36.8  C)-102.8  F (39.3  C)] 102.8  F (39.3  C)  Pulse:  [] 100  Resp:  [18] 18  BP: (107-131)/(66-83) 121/68  SpO2:  [98 %-100 %] 98 %  I/O last 3  completed shifts:  In: 360 [P.O.:360]  Out: -     Uterine fundus is firm, non-tender and at the level of the umbilicus  Extremities Non-tender  Breasts are non-tender and not engorged.    Data   Recent Labs   Lab Test 11/21/19  1110   ABO A   RH Pos   AS Neg     Recent Labs   Lab Test 11/21/19  1110 09/11/19  1456   HGB 10.4* 11.0*     Recent Labs   Lab Test 04/23/19  1220   RUQIGG 39

## 2019-11-23 NOTE — PHARMACY-VANCOMYCIN DOSING SERVICE
Pharmacy Consult- Gentamicin Assessment    Jenny Hadley is a 29 year old female admitted on 2019.    Vancomycin has been ordered per MD, for the indication of: endomyometritis      Current Antibiotic Regimen Includes: clindamycin 900 mg IV Q8H, ampicillin 1 gram Q6H, and gentamicin per pharmacy    Patient Active Problem List   Diagnosis     History of molar pregnancy     High risk pregnancy, antepartum     Encounter for triage in pregnant patient     Normal labor and delivery     Vaginal delivery       Allergies (and reaction): Cats    Most recent flowsheet    Most recent flowsheet        Intake/Output Summary (Last 24 hours) at 2019 1106  Last data filed at 2019 1500  Gross per 24 hour   Intake 360 ml   Output --   Net 360 ml       Tmax = Temp (24hrs), Av.4  F (38  C), Min:98.3  F (36.8  C), Max:102.8  F (39.3  C)      Recent Labs   Lab Test 19  0905   WBC 16.2*       Recent Labs   Lab Test 18  1232   BUN 8   CR 0.51*       CrCl cannot be calculated (Patient's most recent lab result is older than the maximum 90 days allowed.).    Cultures Pending: Urine 19, blood x2 19    Culture Results: pending    Plan: Give gentamicin 340 mg IV (5 mg/kg adjusted body weight) Q24H. Per Uptodate recommendations for treatment of endometritis. If therapy is expected to last longer than 72 hours will plan to check extended interval dosing level. SCr pending at time of note - baseline renal function WNL. ** SCr resulted 19 WNL**    Regimen Start Date: 19  Recommended Dose: 340 mg, which provides 5 mg/kg/dose - adjusted body weight  Interval:Q24H    Thank You for the consult. Will continue to follow.    Judi Capps [facility] ....................  2019   11:06 AM

## 2019-11-23 NOTE — PLAN OF CARE
Patient is bonding well with babe.  Inconsistent with latch/breastfeeding- babe will have good latch/suck/swallow at times- did well for last 2 feedings.  Fundus is firm/midline 2 below, pink vaginal drainage- light flow.  Has been alternating IBU and tylenol- using ice packs- with adequate pain management.  Mother bonding well with babe.  Sandy Real RN on 11/22/2019 at 6:41 PM

## 2019-11-23 NOTE — PROGRESS NOTES
Lab Results   Component Value Date    WBC 16.2 11/23/2019     Lab Results   Component Value Date    RBC 3.89 11/23/2019     Lab Results   Component Value Date    HGB 10.4 11/23/2019     Lab Results   Component Value Date    HCT 32.5 11/23/2019     No components found for: MCT  Lab Results   Component Value Date    MCV 84 11/23/2019     Lab Results   Component Value Date    MCH 26.7 11/23/2019     Lab Results   Component Value Date    MCHC 32.0 11/23/2019     Lab Results   Component Value Date    RDW 13.4 11/23/2019     Lab Results   Component Value Date     11/23/2019       Still fevering    I/P  Fever of unknown origin  Waiting on blood and urine culture  Start ampicillin 1 g IV q 6 hours and gentamicin  mg load, and then 80 mg every 8 hours or as per pharmacy, until afebrile x 24 hours.    Wily Mars MD FACOG  10:12 AM 11/23/2019

## 2019-11-23 NOTE — PLAN OF CARE
Pt c/o abd pain. PRN ibuprofen and acetaminophen alternated with a decrease in pain. Fundus is midline, firm without massage, U/2. 1st degree lac intact with ice in place. Scant/light rubra noted on the pad with no clots. LS clear. HR reg. Breast feeding independently, latch observed. Lanolin cream applied to nipples. /83   Pulse 86   Temp 98.6  F (37  C) (Oral)   Resp 18   SpO2 100%  room air Jamila Kelley RN on 11/23/2019 at 5:29 AM

## 2019-11-24 VITALS
HEART RATE: 97 BPM | DIASTOLIC BLOOD PRESSURE: 83 MMHG | TEMPERATURE: 99.2 F | RESPIRATION RATE: 16 BRPM | SYSTOLIC BLOOD PRESSURE: 125 MMHG | OXYGEN SATURATION: 97 %

## 2019-11-24 LAB
ALBUMIN UR-MCNC: NEGATIVE MG/DL
APPEARANCE UR: CLEAR
BILIRUB UR QL STRIP: NEGATIVE
COLOR UR AUTO: YELLOW
GLUCOSE UR STRIP-MCNC: NEGATIVE MG/DL
HGB UR QL STRIP: ABNORMAL
KETONES UR STRIP-MCNC: NEGATIVE MG/DL
LEUKOCYTE ESTERASE UR QL STRIP: NEGATIVE
MUCOUS THREADS #/AREA URNS LPF: PRESENT /LPF
NITRATE UR QL: NEGATIVE
PH UR STRIP: 7 PH (ref 5–9)
RBC #/AREA URNS AUTO: ABNORMAL /HPF
SOURCE: ABNORMAL
SP GR UR STRIP: <1.005 (ref 1–1.03)
UROBILINOGEN UR STRIP-ACNC: 0.2 EU/DL (ref 0.2–1)
WBC #/AREA URNS AUTO: ABNORMAL /HPF

## 2019-11-24 PROCEDURE — 25000128 H RX IP 250 OP 636: Performed by: OBSTETRICS & GYNECOLOGY

## 2019-11-24 PROCEDURE — 25000125 ZZHC RX 250: Performed by: OBSTETRICS & GYNECOLOGY

## 2019-11-24 PROCEDURE — 25000132 ZZH RX MED GY IP 250 OP 250 PS 637: Performed by: FAMILY MEDICINE

## 2019-11-24 PROCEDURE — 25800030 ZZH RX IP 258 OP 636: Performed by: OBSTETRICS & GYNECOLOGY

## 2019-11-24 PROCEDURE — 99207 ZZC NO CHARGE LOS: CPT | Performed by: OBSTETRICS & GYNECOLOGY

## 2019-11-24 RX ORDER — AMOXICILLIN 250 MG
1-2 CAPSULE ORAL 2 TIMES DAILY PRN
Qty: 20 TABLET | Refills: 0 | Status: SHIPPED | OUTPATIENT
Start: 2019-11-24 | End: 2020-01-20

## 2019-11-24 RX ORDER — IBUPROFEN 600 MG/1
600 TABLET, FILM COATED ORAL EVERY 6 HOURS PRN
Qty: 30 TABLET | Refills: 0 | Status: SHIPPED | OUTPATIENT
Start: 2019-11-24 | End: 2020-01-20

## 2019-11-24 RX ADMIN — AMPICILLIN SODIUM 1 G: 1 INJECTION, POWDER, FOR SOLUTION INTRAMUSCULAR; INTRAVENOUS at 16:05

## 2019-11-24 RX ADMIN — SENNOSIDES AND DOCUSATE SODIUM 1 TABLET: 8.6; 5 TABLET ORAL at 10:08

## 2019-11-24 RX ADMIN — ACETAMINOPHEN 650 MG: 325 TABLET, FILM COATED ORAL at 03:41

## 2019-11-24 RX ADMIN — AMPICILLIN SODIUM 1 G: 1 INJECTION, POWDER, FOR SOLUTION INTRAMUSCULAR; INTRAVENOUS at 03:40

## 2019-11-24 RX ADMIN — CLINDAMYCIN PHOSPHATE 900 MG: 900 INJECTION, SOLUTION INTRAVENOUS at 11:14

## 2019-11-24 RX ADMIN — GENTAMICIN SULFATE 340 MG: 40 INJECTION, SOLUTION INTRAMUSCULAR; INTRAVENOUS at 12:22

## 2019-11-24 RX ADMIN — ACETAMINOPHEN 650 MG: 325 TABLET, FILM COATED ORAL at 15:11

## 2019-11-24 RX ADMIN — AMPICILLIN SODIUM 1 G: 1 INJECTION, POWDER, FOR SOLUTION INTRAMUSCULAR; INTRAVENOUS at 10:07

## 2019-11-24 RX ADMIN — IBUPROFEN 800 MG: 400 TABLET ORAL at 01:17

## 2019-11-24 RX ADMIN — CLINDAMYCIN PHOSPHATE 900 MG: 900 INJECTION, SOLUTION INTRAVENOUS at 02:36

## 2019-11-24 RX ADMIN — IBUPROFEN 800 MG: 400 TABLET ORAL at 07:59

## 2019-11-24 NOTE — PLAN OF CARE
"Pt c/o abd cramping. PRN acetaminophen and ibuprofen alternated. IV abx administered. LS clear. HR regular. Fundus midline and firm without massage U/1. Scant rubra noted with no clots. First degree tear intact. Ice in place. Tucks and gisela-bottle encouraged. Pt states, \"I think I'm feeling better\". Breast feeding independently and attentive to infant needs. Lanolin cream at bedside. Infant discharge, but remains in room.  at bedside to care for infant. /66   Pulse 79   Temp 99.9  F (37.7  C) (Tympanic)   Resp 18   SpO2 96% room air. Jamila Kelley RN on 11/24/2019 at 2:49 AM      "

## 2019-11-24 NOTE — DISCHARGE SUMMARY
[facility]    Discharge Summary  Obstetrics    Date of Admission:  2019  Date of Discharge:  2019  Discharging Provider: Wily Mars    Discharge Diagnoses   Vaginal birth  Fever of unknown origin  Possible endomyometritis    History of Present Illness   Jenny Hadley is a 29 year old female who presented with active labor and delivered without incident.     Hospital Course   The patient's hospital course was remarkable for fever on PPD 2. She was started on triple antibiotics IV, and defervesced later that afternoon. She was afebrile for 20 hours at the time of this note.  On discharge, her pain was well controlled. Vaginal bleeding is similar to peak menstrual flow.  Voiding without difficulty.  Ambulating well and tolerating a normal diet.  Breastfeeding well.  Infant is stable.  She was discharged on post-partum day #3.    Post-partum hemoglobin:   Hemoglobin   Date Value Ref Range Status   2019 10.4 (L) 11.7 - 15.7 g/dL Final       Wily Mars MD    Discharge Disposition   Discharged to home   Condition at discharge: Stable    Primary Care Physician   GEMMA BAILEY    Consultations This Hospital Stay   PHARMACY TO DOSE GENTAMICIN  ANESTHESIOLOGY IP CONSULT  HOME CARE POST PARTUM/ IP CONSULT  LACTATION IP CONSULT    Discharge Orders      Activity    Review discharge instructions     Reason for your hospital stay    Maternity care     Follow Up and recommended labs and tests    Follow up with me,  Wily Mars MD, within 6 weeks. for hospital follow- up.  No follow up labs or test are needed.     Discharge Instructions - Postpartum visit    Schedule postpartum visit with your provider and return to clinic in 6 weeks. Also 2 weeks if a  section was done.     Diet    Resume previous diet     Discharge Medications   Current Discharge Medication List      START taking these medications    Details   ibuprofen (ADVIL/MOTRIN) 600 MG  tablet Take 1 tablet (600 mg) by mouth every 6 hours as needed for other (cramping)  Qty: 30 tablet, Refills: 0    Associated Diagnoses: Vaginal delivery      senna-docusate (SENOKOT-S/PERICOLACE) 8.6-50 MG tablet Take 1-2 tablets by mouth 2 times daily as needed for constipation  Qty: 20 tablet, Refills: 0    Associated Diagnoses: Vaginal delivery         CONTINUE these medications which have NOT CHANGED    Details   Prenatal Multivit-Min-Fe-FA (PRENATAL VITAMINS) 0.8 MG TABS       Dimethicone 1.2 % GEL Use daily as directed, if needed.           Allergies   Allergies   Allergen Reactions     Cats Itching and Shortness Of Breath

## 2019-11-24 NOTE — PROGRESS NOTES
Patient continuing to spike temperatures. Alternating ibuprofen and tylenol to help with pain control and fever. Will update MD.     Patient is postpartum day 2. Breast feeding on demand. Good latch noted and able to bond with baby. Denies any pain of nipples and continues to apply lanolin cream. Complains of generalized aches and pains. Reports throat is dry, however denies discomfort. Fundus is midline and firm without massage and 2 below the umbilicus. First degree laceration is intact. Applying ice and tucks pads. Scant, light rubra noted on pads. Was able to have a bowel movement today that helped with previous abdominal pain. Lung sounds clear. Heart rate regular. Bowel sounds active. Father at bedside to help with cares. Vitals stable, other than fever of unknown origin.     /70 (BP Location: Right arm)   Pulse 120   Temp 102.4  F (39.1  C) (Tympanic)   Resp 16   SpO2 98%   Breastfeeding Unknown     David Ambrocio RN 11/23/19 6:11 PM

## 2019-11-24 NOTE — PROGRESS NOTES
Discharge Note      Data:  Jenny Hadley discharged to home at 1724 via ambulation. Accompanied by spouse, child and staff.    Action:  Written discharge/follow-up instructions were provided to patient. Prescriptions sent to patients preferred pharmacy. All belongings sent with patient. ID's matched on baby and Mom. Baby was placed on car seat.    Response:  Jenny verbalized understanding of discharge instructions, reason for discharge, and necessary follow-up appointments.    Nettie Lara RN on 11/24/2019 at 5:33 PM

## 2019-11-24 NOTE — PROGRESS NOTES
[facility]    Post-Partum Progress Note    Assessment & Plan   Assessment:  Post-partum day #3  Normal spontaneous vaginal delivery  Fever of unknown origin- now afebrile since yesterday afternoon on triple abx.  Suspect endomyometritis    Improving    Plan:  Continue iv abx until afebrile x 24 hours, then discharge.    Wily LÓPEZNelson Madisyn     Interval History   Doing well.  Pain is well-controlled.  No history of foul-smelling vaginal discharge.  Good appetite.  Denies chest pain, shortness of breath, nausea or vomiting.  Vaginal bleeding is similar to a heavy menstrual flow.  Ambulatory.  Breastfeeding well.    No cough or difficulty breathing, defervesced yesterday afternoon, no leg pain, dysuria, abdominal rlq pain has decreased, no hematochezia, BM yesterday was normal. No leg pain, chest pain or hemoptysis.    Medications     lactated ringers       - MEDICATION INSTRUCTIONS -       - MEDICATION INSTRUCTIONS -       - MEDICATION INSTRUCTIONS -       - MEDICATION INSTRUCTIONS -       NO Rho (D) immune globulin (RhoGam) needed - mother Rh POSITIVE       - MEDICATION INSTRUCTIONS -       - MEDICATION INSTRUCTIONS -       oxytocin in 0.9% NaCl 100 mL/hr (11/22/19 0200)     oxytocin in 0.9% NaCl       oxytocin in 0.9% NaCl         ampicillin  1 g Intravenous Q6H     bupivacaine HCl (PF)  10 mL EPIDURAL Once     clindamycin  900 mg Intravenous Q8H     gentamicin  5 mg/kg (Adjusted) Intravenous Q24H     - MEDICATION INSTRUCTIONS -   Does not apply See Admin Instructions    Or     - MEDICATION INSTRUCTIONS -   Does not apply See Admin Instructions    Or     - MEDICATION INSTRUCTIONS -   Does not apply See Admin Instructions    Or     - MEDICATION INSTRUCTIONS -   Does not apply See Admin Instructions     senna-docusate  1 tablet Oral BID    Or     senna-docusate  2 tablet Oral BID       Physical Exam   Temp: 96.8  F (36  C) Temp src: Tympanic BP: 108/77 Pulse: 78   Resp: 16 SpO2: 97 % O2 Device: None  (Room air)    There were no vitals filed for this visit.  Vital Signs with Ranges  Temp:  [96.8  F (36  C)-102.4  F (39.1  C)] 96.8  F (36  C)  Pulse:  [] 78  Resp:  [16-18] 16  BP: (104-114)/(63-77) 108/77  SpO2:  [96 %-98 %] 97 %  I/O last 3 completed shifts:  In: 150 [I.V.:150]  Out: -     Uterine fundus is firm, non-tender and at the level of the umbilicus  Extremities Non-tender    Data   Recent Labs   Lab Test 11/21/19  1110   ABO A   RH Pos   AS Neg     Recent Labs   Lab Test 11/23/19  0905 11/21/19  1110   HGB 10.4* 10.4*     Recent Labs   Lab Test 04/23/19  1220   RUQIGG 39

## 2019-11-25 ENCOUNTER — LACTATION ENCOUNTER (OUTPATIENT)
Age: 29
End: 2019-11-25

## 2019-11-25 ENCOUNTER — HOSPITAL ENCOUNTER (OUTPATIENT)
Dept: OBGYN | Facility: OTHER | Age: 29
End: 2019-11-25
Attending: FAMILY MEDICINE
Payer: COMMERCIAL

## 2019-11-25 LAB
BACTERIA SPEC CULT: NO GROWTH
SPECIMEN SOURCE: NORMAL

## 2019-11-25 PROCEDURE — S9443 LACTATION CLASS: HCPCS | Performed by: REGISTERED NURSE

## 2019-11-25 NOTE — LACTATION NOTE
This note was copied from a baby's chart.  Outpatient Lactation Visit    Delicia Hadley  [id number]    Consultation Date: 2019     Reason for Lactation Referral: Initial Lactation Consult    Baby's : 2019    Baby's Current Age: 4 day old  Baby's Gestational Age: Gestational Age: 38w1d    Primary Care Provider: Pia Rangel    Presenting Problem (concerns as stated by parent): no concerns    MATERNAL HISTORY   History of Breast Surgery: no  Breast Changes During Pregnancy: no  Breast Feeding History: nursed first child for 13 months  Maternal Meds: daily prenatal vitamin  Pregnancy Complications: none  Anesthesia during labor: intrathecal    MATERNAL ASSESSMENT    Breast Size: average, symmetrical, soft after feeding and filling prior to feeding  Nipple Appearance - Left: slightly cracked, with signs of healing, education on further healing techniques provided  Nipple Appearance - Right: slightly cracked, with signs of healing, education on further healing techniques provided  Nipple Erectility - Left: erect with stimulation  Nipple Erectility - Right: erect with stimulation  Areolas Compressibility: soft  Nipple Size: average  Special Equipment Used: none  Day mother reports milk came in:  Day 3    INFANT ASSESSMENT    Oral Anatomy  Mouth: normal  Palate: normal  Jaw: normal  Tongue: normal  Frenulum: normal   Digital Suck Exam: root    FEEDING   Feeding Time: aggressively for 20 minutes  Position:  cradle  Effort to Latch: awake and alert, latched easily  Duration of Breast Feeding: Right Breast: 20 minutes; Left Breast: 0  Results: excellent breast feed    Volume of Intake:    Birth Weight: 6 lb 7.4 oz    Hospital discharge weight: 6 lb 1 oz    Today's Weight 5 lb 15.7 oz    Total Intake: 1.3 oz  Output: 3-4 soil diapers in last 24 hours, 3-4 wet diapers in last 24 hours    LATCH Score:   Latch: 2 - Good Latch  Audible Swallowin - Spontaneous & frequent  Type of Nipple:  (Breast/Nipple) 2 - Everted  Comfort: 2 - Soft, Nontender  Hold: 2 - No Assist   Total LATCH Score:  10    FEEDING PLAN    Home Feeding Plan: Continue to feed on demand when  elicits feeding cues with deep latch.  Babe should be eating 8-12 times in a 24 hour period.  Exclusivity explained and encouraged in the early weeks to establish breastfeeding and order in milk supply.  Rooming-in encouraged with explanation of the benefits.  Continue to apply expressed breast milk and Lanolin cream to nipples after feedings for healing and comfort.  Postpartum breastfeeding assessment completed and education provided.  Items included in the education are:     proper positioning and latch    effectiveness of feeding    manual expression    handling and storing breastmilk    maintenance of breastfeeding for the first 6 months    sign/symptoms of infant feeding issues requiring referral to qualified health care provider    LACTATION COMMENTS   Deep latch explained for proper positioning of breast in infant's mouth, maximizing milk transfer and comfort.  Reassurance and encouragement provided in regard to mom's concerns about milk supply.  Follow-up support information provided.  Parents plan to keep  Well-Child Check with Dr. Tong as scheduled for 2 week well child check.      Face-to-face Time: 60 minutes with assessment and education.    Kasandra Nieves RN  2019  2:02 PM

## 2019-11-29 LAB
BACTERIA SPEC CULT: NORMAL
BACTERIA SPEC CULT: NORMAL
SPECIMEN SOURCE: NORMAL
SPECIMEN SOURCE: NORMAL

## 2020-01-20 ENCOUNTER — PRENATAL OFFICE VISIT (OUTPATIENT)
Dept: FAMILY MEDICINE | Facility: OTHER | Age: 30
End: 2020-01-20
Attending: FAMILY MEDICINE
Payer: COMMERCIAL

## 2020-01-20 VITALS
BODY MASS INDEX: 26.9 KG/M2 | HEIGHT: 66 IN | HEART RATE: 80 BPM | DIASTOLIC BLOOD PRESSURE: 72 MMHG | TEMPERATURE: 97 F | WEIGHT: 167.4 LBS | SYSTOLIC BLOOD PRESSURE: 112 MMHG | RESPIRATION RATE: 16 BRPM

## 2020-01-20 PROCEDURE — 99213 OFFICE O/P EST LOW 20 MIN: CPT | Performed by: FAMILY MEDICINE

## 2020-01-20 ASSESSMENT — PAIN SCALES - GENERAL: PAINLEVEL: NO PAIN (0)

## 2020-01-20 ASSESSMENT — PATIENT HEALTH QUESTIONNAIRE - PHQ9: SUM OF ALL RESPONSES TO PHQ QUESTIONS 1-9: 0

## 2020-01-20 ASSESSMENT — MIFFLIN-ST. JEOR: SCORE: 1488.13

## 2020-01-20 NOTE — NURSING NOTE
Patient presents to the clinic today for a post-partum check.   Med rec complete.  Jalyn Bullard LPN.................. 1/20/2020 1:22 PM

## 2020-01-20 NOTE — PROGRESS NOTES
Nursing Notes:   Jalyn Bullard LPN  2020  1:27 PM  Signed  Patient presents to the clinic today for a post-partum check.   Med rec complete.  Jalyn Aleah TADEO.................. 2020 1:22 PM      SUBJECTIVE: Jenny is here for a 6-week postpartum checkup.    38w1d - she had a short second stage of labor after SROM.  Received IT.  Pregnancy notable for past history of molar pregnancy.  Placenta was sent to pathology and this was normal.  She is breast feeding. No concerns about infection or with milk production.    She gets a 4-6 hour stretch of sleep during the night.      She stopped bleeding after about 2 weeks.  Bladder seems to be working ok.  Maybe some heartburn this past weekend.    No constipation.      Date of Last Pap:   - normal.     Allergies   Allergen Reactions     Cats Itching and Shortness Of Breath     Current Outpatient Medications   Medication     Dimethicone 1.2 % GEL     Prenatal Multivit-Min-Fe-FA (PRENATAL VITAMINS) 0.8 MG TABS     No current facility-administered medications for this visit.       Hemoglobin   Date Value Ref Range Status   2019 10.4 (L) 11.7 - 15.7 g/dL Final   ]      Date of delivery 19. She had a  of a viable girl, weight 6 pounds 7 oz., with no complications.  Since delivery, she has been breast feeding.  She has no signs of infection, bleeding or other complications.    We discussed contraceptions and she has chosen none.    She  has had intercourse since delivery and complains of moderate discomfort.   Patient screened for postpartum depression and symptoms are NEGATIVE.   PHQ-2 Score:     PHQ-2 (  [employer]) 2019   Q1: Little interest or pleasure in doing things 0 0   Q2: Feeling down, depressed or hopeless 0 0   PHQ-2 Score 0 0     PHQ-9 SCORE 2016   PHQ-9 Total Score 7 0       Screening has also been completed for intimate partner violence.  This is negative.     EXAM: /72   Pulse 80  "  Resp 16   Ht 1.664 m (5' 5.5\")   LMP  (LMP Unknown)   Breastfeeding Yes   BMI 29.99 kg/m     Wt Readings from Last 3 Encounters:   11/19/19 83 kg (183 lb)   11/14/19 81.9 kg (180 lb 8 oz)   11/04/19 81.4 kg (179 lb 6.4 oz)         GENERAL APPEARANCE: healthy, alert and no distress     EYES: EOMI, PERRL     NECK: no adenopathy, no asymmetry, masses, or scars and thyroid normal to palpation     RESP: lungs clear to auscultation - no rales, rhonchi or wheezes     CV: regular rates and rhythm, normal S1 S2, no S3 or S4 and no murmur, click or rub     ABDOMEN:  soft, nontender, no HSM or masses and bowel sounds normal     MS: extremities normal- no gross deformities noted, no evidence of inflammation in joints, FROM in all extremities.     SKIN: no suspicious lesions or rashes     NEURO: Normal strength and tone, sensory exam grossly normal, mentation intact and speech normal     PSYCH: mentation appears normal. and affect normal/bright  Hemoglobin   Date Value Ref Range Status   11/23/2019 10.4 (L) 11.7 - 15.7 g/dL Final   ]      ASSESSMENT:     ICD-10-CM    1. Postpartum care and examination of lactating mother Z39.1          PLAN: 1.  PPBC plan is none  2.  Discussed exercise, activity, nutrition  3.  Discussed changes in sleep routine, sleep deficit monitoring  4.  Next pap smear due 2022  5.  Continue prenatal vitamins while nursing.     Pia Tong MD     "

## 2020-06-02 ENCOUNTER — MEDICAL CORRESPONDENCE (OUTPATIENT)
Dept: HEALTH INFORMATION MANAGEMENT | Facility: OTHER | Age: 30
End: 2020-06-02

## 2020-11-04 ENCOUNTER — OFFICE VISIT (OUTPATIENT)
Dept: FAMILY MEDICINE | Facility: OTHER | Age: 30
End: 2020-11-04
Attending: NURSE PRACTITIONER
Payer: COMMERCIAL

## 2020-11-04 VITALS
SYSTOLIC BLOOD PRESSURE: 102 MMHG | BODY MASS INDEX: 25.16 KG/M2 | HEART RATE: 80 BPM | OXYGEN SATURATION: 98 % | RESPIRATION RATE: 20 BRPM | TEMPERATURE: 98.7 F | DIASTOLIC BLOOD PRESSURE: 64 MMHG | WEIGHT: 153.5 LBS

## 2020-11-04 DIAGNOSIS — J02.9 SORE THROAT: Primary | ICD-10-CM

## 2020-11-04 LAB
SPECIMEN SOURCE: NORMAL
STREP GROUP A PCR: NOT DETECTED

## 2020-11-04 PROCEDURE — 99213 OFFICE O/P EST LOW 20 MIN: CPT | Performed by: FAMILY MEDICINE

## 2020-11-04 PROCEDURE — U0003 INFECTIOUS AGENT DETECTION BY NUCLEIC ACID (DNA OR RNA); SEVERE ACUTE RESPIRATORY SYNDROME CORONAVIRUS 2 (SARS-COV-2) (CORONAVIRUS DISEASE [COVID-19]), AMPLIFIED PROBE TECHNIQUE, MAKING USE OF HIGH THROUGHPUT TECHNOLOGIES AS DESCRIBED BY CMS-2020-01-R: HCPCS | Mod: ZL | Performed by: FAMILY MEDICINE

## 2020-11-04 PROCEDURE — C9803 HOPD COVID-19 SPEC COLLECT: HCPCS

## 2020-11-04 PROCEDURE — 87651 STREP A DNA AMP PROBE: CPT | Mod: ZL | Performed by: NURSE PRACTITIONER

## 2020-11-04 ASSESSMENT — PAIN SCALES - GENERAL: PAINLEVEL: EXTREME PAIN (8)

## 2020-11-04 NOTE — PATIENT INSTRUCTIONS
Try salt water gargles 1/2 tsp salt in 1 c warm water three times daily  Use acetaminophen or ibuprofen for fever or pain.

## 2020-11-04 NOTE — NURSING NOTE
Pt here for sore throat since Monday.  Has worsened since.  Left ear started hurting today.  Erlinda Phillips CMA ([facility])......................11/4/2020  12:39 PM       Medication Reconciliation: complete    Erlinda Phillips CMA  11/4/2020 12:39 PM

## 2020-11-04 NOTE — PROGRESS NOTES
Nursing Notes:   Erlinda Phillips RENEUSHA Damon  11/4/2020 12:40 PM  Sign at exiting of workspace  Pt here for sore throat since Monday.  Has worsened since.  Left ear started hurting today.  Erlinda RENE USHA Phillips ([facility])......................11/4/2020  12:39 PM       Medication Reconciliation: complete    Erlinda RENENelson Phillips CMA  11/4/2020 12:39 PM      SUBJECTIVE:  30 year old female presents to [facility] for sore throat for 2 days. She also has left ear pain.   No cough, chest tightness, or shortness of breath.  No fever.  Her ear has not been plugged or making popping or crackling noises.  She has not really tried anything for the throat.  Reports strep and Covid testing in May 2020, with strep positive.  She works at [employer].  No close personal Covid exposures.      REVIEW OF SYSTEMS:    Pertinent items are noted in HPI.    No current outpatient medications on file.     Allergies   Allergen Reactions     Cats Itching and Shortness Of Breath       OBJECTIVE:  /64 (BP Location: Left arm, Patient Position: Sitting, Cuff Size: Adult Regular)   Pulse 80   Temp 98.7  F (37.1  C) (Temporal)   Resp 20   Wt 69.6 kg (153 lb 8 oz)   SpO2 98%   Breastfeeding Yes   BMI 25.16 kg/m      EXAM:  General Appearance: Pleasant, alert, appropriate appearance for age. No acute distress  Ear Exam: Normal TM's bilaterally. Normal auditory canals  OroPharynx Exam: Normal pharynx.  Neck Exam: Supple, no masses or nodes.  Chest/Respiratory Exam: Normal chest wall and respirations. Clear to auscultation.  Cardiovascular Exam: Regular rate and rhythm. S1, S2, no murmur, click, gallop, or rubs.  Psychiatric: Normal affect and mentation      ASSESSMENT/PLAN:    ICD-10-CM    1. Sore throat  J02.9 Group A Streptococcus PCR Throat Swab     Symptomatic COVID-19 Virus (Coronavirus) by PCR       No concerning features on exam.  Unlikely to have strep.  We will call her with results.  Only has sore throat, discussed utility of Covid  testing.  She elected for the swab. Out of work until results are known  Recommend salt water gargles TID to help with sore throat.      Follow-up as needed     Sebastián Jay MD    This document was prepared using a combination of typing and voice generated software.  While every attempt was made for accuracy, spelling and grammatical errors may exist.

## 2020-11-05 LAB
SARS-COV-2 RNA SPEC QL NAA+PROBE: NOT DETECTED
SPECIMEN SOURCE: NORMAL

## 2021-01-03 ENCOUNTER — HEALTH MAINTENANCE LETTER (OUTPATIENT)
Age: 31
End: 2021-01-03

## 2021-03-06 ENCOUNTER — HEALTH MAINTENANCE LETTER (OUTPATIENT)
Age: 31
End: 2021-03-06

## 2021-05-24 ENCOUNTER — ALLIED HEALTH/NURSE VISIT (OUTPATIENT)
Dept: FAMILY MEDICINE | Facility: OTHER | Age: 31
End: 2021-05-24
Attending: FAMILY MEDICINE
Payer: COMMERCIAL

## 2021-05-24 DIAGNOSIS — J02.9 SORE THROAT: Primary | ICD-10-CM

## 2021-05-24 LAB
SARS-COV-2 RNA RESP QL NAA+PROBE: NORMAL
SPECIMEN SOURCE: NORMAL

## 2021-05-24 PROCEDURE — C9803 HOPD COVID-19 SPEC COLLECT: HCPCS

## 2021-05-24 PROCEDURE — U0005 INFEC AGEN DETEC AMPLI PROBE: HCPCS | Mod: ZL | Performed by: FAMILY MEDICINE

## 2021-05-24 PROCEDURE — U0003 INFECTIOUS AGENT DETECTION BY NUCLEIC ACID (DNA OR RNA); SEVERE ACUTE RESPIRATORY SYNDROME CORONAVIRUS 2 (SARS-COV-2) (CORONAVIRUS DISEASE [COVID-19]), AMPLIFIED PROBE TECHNIQUE, MAKING USE OF HIGH THROUGHPUT TECHNOLOGIES AS DESCRIBED BY CMS-2020-01-R: HCPCS | Mod: ZL | Performed by: FAMILY MEDICINE

## 2021-05-25 LAB
LABORATORY COMMENT REPORT: NORMAL
SARS-COV-2 RNA RESP QL NAA+PROBE: NEGATIVE
SPECIMEN SOURCE: NORMAL

## 2021-06-09 ENCOUNTER — VIRTUAL VISIT (OUTPATIENT)
Dept: OBGYN | Facility: OTHER | Age: 31
End: 2021-06-09
Attending: FAMILY MEDICINE
Payer: COMMERCIAL

## 2021-06-09 VITALS — BODY MASS INDEX: 23.3 KG/M2 | WEIGHT: 145 LBS | HEIGHT: 66 IN

## 2021-06-09 DIAGNOSIS — Z32.01 PREGNANCY EXAMINATION OR TEST, POSITIVE RESULT: ICD-10-CM

## 2021-06-09 DIAGNOSIS — Z34.90 EARLY STAGE OF PREGNANCY: Primary | ICD-10-CM

## 2021-06-09 PROCEDURE — 99207 PR OB VISIT-NO CHARGE - GICH ONLY: CPT

## 2021-06-09 ASSESSMENT — PATIENT HEALTH QUESTIONNAIRE - PHQ9: SUM OF ALL RESPONSES TO PHQ QUESTIONS 1-9: 1

## 2021-06-09 ASSESSMENT — MIFFLIN-ST. JEOR: SCORE: 1381.53

## 2021-06-09 NOTE — PROGRESS NOTES
First Obstetric Visit       HPI       Jenny Hadley is a 30 year old   who presents for an initial prenatal visit at 6w6d weeks of pregnancy with RAVI of 2022 by LMP of Patient's last menstrual period was 2021 (exact date)..      Father of baby:  Andrea  Work status [employer]  Significant issues with prior pregnancy(ies):  See below     She has not had bleeding since her LMP.   She has had mild nausea.   Weight loss has not occurred.    This was a planned pregnancy.     OTHER CONCERNS: Risk factors reviewed: history of molar pregnancy    Advise patients with a prior molar pregnancy to have a first trimester ultrasound to confirm normal gestational development.  (Done).  Additionally, we measure a serum hCG at six weeks after the completion of any type of future pregnancy (eg, term delivery, spontaneous , induced ) to exclude choriocarcinoma. After  or term deliveries in patients with prior molar pregnancy or GTN, the placenta should be carefully examined and sent to pathology if any abnormalities are present.      Labor Risk Assessment   Is the patient's age <18 or >40?     No  Patint's BMI is Body mass index is 26.22 kg/m .   Does patient have a BMI < 18.5?     No  Prior delivery within 6 months?      No  Ever delivered prior to 37 weeks gestation?  No  Pregnancy occur via In Vitro Fertilization?   No  Are you carrying twins?       No    The patient has the following additional risk factors for  labor:   none       Labor Risk Summary:  Pt is high risk for  Labor  No      Past Medical History  Past Medical History:   Diagnosis Date     History of prior pregnancies     [id number] EDC 22     Molar pregnancy 2018     Do you have a history of any of the following medical conditions?    Condition Yes/No   Hypertension No   Heart disease, mitral valve prolapse, rheumatic fever No   Asthma or another chronic lung disease No   An  autoimmune disorder No   Kidney disease No   Frequent urinary tract infections No   Migraine headaches No   Stroke, loss of sensation/function, seizures, or other neuro problem No   Diabetes No   Thyroid problems or have you taken thyroid medication No   Hepatitis, liver disease, jaundice No   Blood clots, phlebitis, pulmonary embolism or varicose veins No   Excessive bleeding after surgery or dental work No   Heavy menstrual periods requiring treatment No   Anemia No   Blood transfusions No        Would you refuse a blood transfusion? No   Breast problems No   Abnormalities of the uterus No   Abnormal pap smear No   Have you been treated for an emotional disturbance? No   Have you been physically, sexually, or emotionally hurt by someone? No   Have you been in a major accident or suffered serious trauma? No   Have you had surgical procedures? Yes        Have you ever had any complications from anesthesia? No   Have you ever been hospitalized for a nonsurgical reason? No     Notes for positives D&C for molar pregnancy    Prenatal Genetic Screening    Do you have a history of any of the following Yes/No        A metabolic disorder (e.g. Insulin-dependent DM, PKU) No        Recurrent pregnancy loss No     Do you, the baby's father, or anyone in your families have Yes/No        Thalassemia AND MCV <80 No        Hemophilia No        Neural tube defect No        Congenital heart defect No        Sickle cell disease or trait No        Muscular dystrophy No        Cystic fibrosis No        Mental retardation or autism No        Down's syndrome No        Tiago-Sach's disease No        Williams's chorea No        Any other inherited genetic or chromosomal disorder No        A child with birth defects not listed above No     Infection History    At high risk for coming in contact with HIV No   Ever treated for tuberculosis No   Ever received the BCG vaccine for tuberculosis No   Ever had genital herpes (or has your partner)  No   Had a rash or viral illness since LMP No   Ever had a sexually transmitted infection No   Ever had chicken pox or the vaccine Yes   Ever had any other serious infectious disease No   Up to date with immunizations Yes, except Covid vaccine       Habits  Have you ever used tobacco products (cigarettes, chewing tobacco)? No, Do you currently use tobacco products? No. , Have you ever used alcohol? Yes - not since positive UPT, Have you ever used any other drugs? No     Allergies   Allergen Reactions     Cats Itching and Shortness Of Breath       Current medications are:  Current Outpatient Medications   Medication Sig Dispense Refill     Prenatal Vit-Fe Fumarate-FA (PRENATAL MULTIVITAMIN  PLUS IRON) 27-1 MG TABS Take 1 tablet by mouth daily         REVIEW OF SYSTEMS  ENT: NEGATIVE for ear, mouth and throat problems - last dentist visit was 2016    CV: NEGATIVE for chest pain, palpitations or peripheral edema  GI: NEGATIVE for nausea, abdominal pain, heartburn, or change in bowel habits  : NEGATIVE for unusual urinary or vaginal symptoms.   C: NEGATIVE for fever, chills, change in weight  I: NEGATIVE for worrisome rashes, moles or lesions  E: NEGATIVE for vision changes or irritation  R: NEGATIVE for significant cough or SOB  B: NEGATIVE for masses, tenderness or discharge  M: NEGATIVE for significant arthralgias or myalgia  N: NEGATIVE for weakness, dizziness or paresthesias  P: NEGATIVE for changes in mood or affect  ====================================================    PHYSICAL EXAM:  /58 (BP Location: Right arm, Patient Position: Sitting, Cuff Size: Adult Regular)   Pulse 73   Temp 98.2  F (36.8  C) (Tympanic)   Resp 16   Wt 72.6 kg (160 lb)   LMP 05/01/2021 (Exact Date)   SpO2 99%   Breastfeeding No   BMI 26.22 kg/m       Wt Readings from Last 4 Encounters:   06/18/21 72.6 kg (160 lb)   06/09/21 65.8 kg (145 lb)   11/04/20 69.6 kg (153 lb 8 oz)   01/20/20 75.9 kg (167 lb 6.4 oz)             GENERAL:  Pleasant pregnant female, alert, well groomed.  SKIN:  Warm and dry, without lesions or rashes  HEAD: Symmetrical features.  EYES:  PERRL, EOMI.  NECK:  Thyroid without enlargement and nodules.  Lymph nodes not palpable.  LUNGS:  Clear to auscultation.  HEART:  RRR without murmur.  ABDOMEN: Soft without masses, tenderness or organomegaly.  No CVA tenderness.   MUSCULOSKELETAL:  Full range of motion  EXTREMITIES:  No edema. No significant varicosities.   GENITALIA:  BUS WNL, no lesions noted  VAGINA:  Pink, normal rugae and discharge, wet prep obtained  CERVIX:   closed 4 cm long.  UTERUS: Anteverted, nontender 6 weeks in size.  ADNEXA: Without masses or tenderness  =========================================    Results for orders placed or performed in visit on 06/18/21   CBC WITH PLATELETS     Status: None   Result Value Ref Range    WBC 8.9 4.0 - 11.0 10e9/L    RBC Count 4.45 3.8 - 5.2 10e12/L    Hemoglobin 13.0 11.7 - 15.7 g/dL    Hematocrit 38.7 35.0 - 47.0 %    MCV 87 78 - 100 fl    MCH 29.2 26.5 - 33.0 pg    MCHC 33.6 31.5 - 36.5 g/dL    RDW 12.2 10.0 - 15.0 %    Platelet Count 276 150 - 450 10e9/L   UA reflex to Microscopic     Status: Abnormal   Result Value Ref Range    Color Urine Yellow     Appearance Urine Clear     Glucose Urine Negative NEG^Negative mg/dL    Bilirubin Urine Negative NEG^Negative    Ketones Urine Negative NEG^Negative mg/dL    Specific Gravity Urine 1.019 1.003 - 1.035    Blood Urine Negative NEG^Negative    pH Urine 7.5 (H) 5.0 - 7.0 pH    Protein Albumin Urine Negative NEG^Negative mg/dL    Urobilinogen mg/dL Normal 0.0 - 2.0 mg/dL    Nitrite Urine Negative NEG^Negative    Leukocyte Esterase Urine Negative NEG^Negative    Source Midstream Urine    ABO/Rh type and screen     Status: None   Result Value Ref Range    ABO A     RH(D) Pos     Antibody Screen Neg     Test Valid Only At [facility]        Specimen Expires 06/21/2021    Wet Prep, Genital      Status: None    Specimen: Vagina   Result Value Ref Range    Specimen Description Vagina     Wet Prep No Trichomonas seen     Wet Prep No yeast seen     Wet Prep No clue cells seen    GC/Chlamydia by PCR     Status: None    Specimen: Urine   Result Value Ref Range    Specimen Source Midstream Urine     Neisseria gonorrhoreae PCR Not Detected NDET^Not Detected    Chlamydia Trachomatis PCR Not Detected NDET^Not Detected   Results for orders placed or performed during the hospital encounter of 06/18/21   US OB < 14 Weeks Single     Status: None    Narrative    PROCEDURE: US OB < 14 WEEKS SINGLE 6/18/2021 1:41 PM    HISTORY: Assess dating and viability. LMP 5/1/2021. MUST BE SCHEDULED  PRIOR TO 1ST OB VISIT.; Early stage of pregnancy; Pregnancy  examination or test, positive result    COMPARISONS: None.    TECHNIQUE: Routine early OB ultrasound.    FINDINGS: There is a gestational sac within the uterus. This contains  a yolk sac and an embryo with cardiac activity and a heart rate of 143  bpm. Based on a crown-rump length of 12 mm estimated gestational age  is 7 weeks 3 days with an EDC by ultrasound of 2/1/2022.    No adnexal abnormality is seen. There is a probable left-sided corpus  luteum.         Impression    IMPRESSION: Early intrauterine gestation with estimated age of 7  weeks.    HOUSTON LASSITER MD         ASSESSMENT & PLAN     ICD-10-CM    1. High-risk pregnancy, unspecified trimester  O09.90 URINE MACROSCOPIC WITH REFLEX TO MICRO     Urine Culture Aerobic Bacterial     Treponema Abs w Reflex to RPR and Titer     HEPATITIS B SURFACE ANTIGEN     CBC WITH PLATELETS     Rubella Antibody IgG Quantitative     ABO/Rh type and screen     Wet Prep, Genital     GC/Chlamydia by PCR     URINE MACROSCOPIC WITH REFLEX TO MICRO     Urine Culture Aerobic Bacterial     ABO/Rh type and screen     Rubella Antibody IgG Quantitative     CBC WITH PLATELETS     HEPATITIS B SURFACE ANTIGEN     Treponema Abs w Reflex to RPR and  Titer     UA reflex to Microscopic     CANCELED: Urobilinogen qualitative urine   2. History of molar pregnancy  Z87.59         30 year old  at 6w6d * weeks of pregnancy with RAVI of 2022 by LMP of Patient's last menstrual period was 2021 (exact date)..      I have personally reviewed the labs listed above.     Pregnancy Risk Assessment: Average risk pregnancy  Dating US ordered?  Yes  -   RESULTS:  2021      COMPARISONS: None.     TECHNIQUE: Routine early OB ultrasound.     FINDINGS: There is a gestational sac within the uterus. This contains  a yolk sac and an embryo with cardiac activity and a heart rate of 143  bpm. Based on a crown-rump length of 12 mm estimated gestational age  is 7 weeks 3 days with an EDC by ultrasound of 2022.     No adnexal abnormality is seen. There is a probable left-sided corpus  luteum.                                                                        IMPRESSION: Early intrauterine gestation with estimated age of 7  weeks.     HOUSTON LASSITER MD    Reviewed recommendations for placenta to go to pathology postdelivery  HIV screening declined    PNV/Folate ordered?  Yes   Follow up:   1 month  Recommended weight gain:  25-35 lbs.    Genetic screening for CF and SMA:  Declined but will be planning on doing quad screen       Instructed on best evidence for: weight gain for her BMI for pregnancy; healthy diet and foods to avoid; exercise and activity during pregnancy; avoiding exposure to toxoplasmosis; and maintenance of a generally healthy lifestyle. Educational folder has been given.    Discussed the harms, benefits, side effects and alternative therapies for current prescribed and OTC medications.    Immunizations COVID vaccine hasn't been given;  Recommend seasonal flu vaccine and boostrix at 28 weeks    Pia Tong MD

## 2021-06-09 NOTE — PROGRESS NOTES
Verbal consent obtained for telephone visit. Total length of call: 20 min    HPI:    This is a 30 year old female patient,  who was called today for OB Intake visit. Patient reports positive pregnancy test at home.     Obstetrical history and OB Demographics updated to the best of this nurse's ability based on patient report. PHQ-9 depression screening and routine Domestic Abuse screening completed. All immediate questions and concerns answered.    Last menstrual period is reported as Patient's last menstrual period was 2021 (exact date). RAVI based on LMP is 2022.  Her cycles are regular.  Her last menstrual period was normal.   Since her LMP, she has experienced  nausea, fatigue and constipation.       Personal OB history includes: natural births 2, G  3 and P  2  Previous OB Provider: Ifeanyi Stafford  Previous Delivering Clinic: [facility]  Release of Records: NA    Current delivery plan: GICH  Preferred OB Provider: Ifeanyi Stafford  Current Primary Care Provider: Ifeanyi Stafford  Pediatrician: Ifeanyi Stafford      Additional History: none    Have you travelled during the pregnancy?No  Have your sexual partner(s) travelled during the pregnancy?No      HISTORY:   Planned Pregnancy: No  Marital Status:   Occupation: Sales  Living in Household: Spouse and Children    Father of the baby is involved.   Family and father of baby are supportive of current pregnancy.  Past Medical History of Father of Baby:No significant medical history but had stomach issues as a teenager and had some of his intestines removed.     Past History:  Her past medical history   Past Medical History:   Diagnosis Date     History of prior pregnancies 2019    , EDC      Molar pregnancy 2018   .      Her past surgical history:   Past Surgical History:   Procedure Laterality Date     DENTAL SURGERY      wisdom teeth     DILATION AND CURETTAGE SUCTION N/A 2018    Procedure: DILATION AND CURETTAGE SUCTION;   Suction Dilation & Curettage;  Surgeon: Wily Mars MD;  Location: [facility] OR       She has a history of  Molar pregnancy     Since her last LMP she denies use of alcohol, tobacco and street drugs.    Pap smear history: YES - updated in Problem List and Health Maintenance accordingly    STD/STI history: No STD history    STD/STI symptoms: no noticeable symptoms     Past medical, surgical, social and family history were reviewed and updated in EPIC.    Medications reviewed by this nurse. Current medication list:  Current Outpatient Medications   Medication Sig Dispense Refill     Prenatal Vit-Fe Fumarate-FA (PRENATAL MULTIVITAMIN  PLUS IRON) 27-1 MG TABS Take 1 tablet by mouth daily       The following medications were recommended to be discontinued due to Pregnancy Category D status: NA      Risk factors:  Moderate and moderately severe risks (consult with OB/Gyn)  Previous fetal or  demise: Yes  History of  delivery: No  History of heart disease Class I: No  Severe anemia, unresponsive to iron therapy: No  Pelvic mass or neoplasm: No  Previous : No  Hyper/hypothyroidism: No  History of postpartum hemorrhage requiring transfusion:No  History of Placenta Accreta: No    High Risk (Pregnancy managed by OB/Gyn)  Multiple pregnancy: No  Pre-gestational diabetes: No  Chronic Hypertension: No  Renal Failure: No  Heart disease, class II or greater: No  Rh Isoimmunization: No  Chronic active hepatitis: No  Convulsive disorder, poorly controlled: No  Isoimmune thrombocytopenia: No  Pre-term premature rupture of membranes: No  Lupus or other autoimmune disorder: No  Human Immunodeficiency Virus: No      ASSESSMENT/PLAN:     No diagnosis found.    30 year old , 5w4d of pregnancy with RAVI of 2022, Alternate RAVI Entry    Per standing orders and scope of practice of this nurse, patient will have the following orders placed and completed prior to initial OB visit with the appropriate  provider:    --early ultrasound for dating and viability ordered for 6+ weeks gestation based on LMP    --Quantitative Beta HCG and progesterone monitoring if indicated    Counseling given:     - Recommended weight gain for pregnancy: 25-35 lbs.   BMI < 18.5  28-40 lbs   18.5 - 24.9 25-35   25 - 29.9 15-25   > 30  < 15       PLAN/PATIENT INSTRUCTIONS:    Normal exercise.  Normal sexual activity.  Prenatal vitamins.  Anticipated weight gain.    follow-up appointment with Dr. Pia Tong MD for pre-tory care and take multivitamin or pre-tory vitamins    Lou Holliday RN.................................................. 2021 10:38 AM

## 2021-06-18 ENCOUNTER — PRENATAL OFFICE VISIT (OUTPATIENT)
Dept: FAMILY MEDICINE | Facility: OTHER | Age: 31
End: 2021-06-18
Attending: FAMILY MEDICINE
Payer: COMMERCIAL

## 2021-06-18 ENCOUNTER — HOSPITAL ENCOUNTER (OUTPATIENT)
Dept: ULTRASOUND IMAGING | Facility: OTHER | Age: 31
End: 2021-06-18
Attending: FAMILY MEDICINE
Payer: COMMERCIAL

## 2021-06-18 VITALS
OXYGEN SATURATION: 99 % | BODY MASS INDEX: 26.22 KG/M2 | SYSTOLIC BLOOD PRESSURE: 104 MMHG | TEMPERATURE: 98.2 F | HEART RATE: 73 BPM | RESPIRATION RATE: 16 BRPM | DIASTOLIC BLOOD PRESSURE: 58 MMHG | WEIGHT: 160 LBS

## 2021-06-18 DIAGNOSIS — Z32.01 PREGNANCY EXAMINATION OR TEST, POSITIVE RESULT: ICD-10-CM

## 2021-06-18 DIAGNOSIS — Z34.90 EARLY STAGE OF PREGNANCY: ICD-10-CM

## 2021-06-18 DIAGNOSIS — Z87.59 HISTORY OF MOLAR PREGNANCY: ICD-10-CM

## 2021-06-18 DIAGNOSIS — O09.90 HIGH-RISK PREGNANCY, UNSPECIFIED TRIMESTER: Primary | ICD-10-CM

## 2021-06-18 LAB
ABO + RH BLD: NORMAL
ABO + RH BLD: NORMAL
ALBUMIN UR-MCNC: NEGATIVE MG/DL
APPEARANCE UR: CLEAR
BILIRUB UR QL STRIP: NEGATIVE
BLD GP AB SCN SERPL QL: NORMAL
BLOOD BANK CMNT PATIENT-IMP: NORMAL
C TRACH DNA SPEC QL NAA+PROBE: NOT DETECTED
COLOR UR AUTO: YELLOW
ERYTHROCYTE [DISTWIDTH] IN BLOOD BY AUTOMATED COUNT: 12.2 % (ref 10–15)
GLUCOSE UR STRIP-MCNC: NEGATIVE MG/DL
HCT VFR BLD AUTO: 38.7 % (ref 35–47)
HGB BLD-MCNC: 13 G/DL (ref 11.7–15.7)
HGB UR QL STRIP: NEGATIVE
KETONES UR STRIP-MCNC: NEGATIVE MG/DL
LEUKOCYTE ESTERASE UR QL STRIP: NEGATIVE
MCH RBC QN AUTO: 29.2 PG (ref 26.5–33)
MCHC RBC AUTO-ENTMCNC: 33.6 G/DL (ref 31.5–36.5)
MCV RBC AUTO: 87 FL (ref 78–100)
N GONORRHOEA DNA SPEC QL NAA+PROBE: NOT DETECTED
NITRATE UR QL: NEGATIVE
PH UR STRIP: 7.5 PH (ref 5–7)
PLATELET # BLD AUTO: 276 10E9/L (ref 150–450)
RBC # BLD AUTO: 4.45 10E12/L (ref 3.8–5.2)
SOURCE: ABNORMAL
SP GR UR STRIP: 1.02 (ref 1–1.03)
SPECIMEN EXP DATE BLD: NORMAL
SPECIMEN SOURCE: NORMAL
SPECIMEN SOURCE: NORMAL
UROBILINOGEN UR STRIP-MCNC: NORMAL MG/DL (ref 0–2)
WBC # BLD AUTO: 8.9 10E9/L (ref 4–11)
WET PREP SPEC: NORMAL

## 2021-06-18 PROCEDURE — 99207 PR OB VISIT-NO CHARGE - GICH ONLY: CPT | Performed by: FAMILY MEDICINE

## 2021-06-18 PROCEDURE — 87086 URINE CULTURE/COLONY COUNT: CPT | Mod: ZL | Performed by: FAMILY MEDICINE

## 2021-06-18 PROCEDURE — 85027 COMPLETE CBC AUTOMATED: CPT | Mod: ZL | Performed by: FAMILY MEDICINE

## 2021-06-18 PROCEDURE — 86900 BLOOD TYPING SEROLOGIC ABO: CPT | Mod: ZL | Performed by: FAMILY MEDICINE

## 2021-06-18 PROCEDURE — 87591 N.GONORRHOEAE DNA AMP PROB: CPT | Mod: ZL | Performed by: FAMILY MEDICINE

## 2021-06-18 PROCEDURE — 87210 SMEAR WET MOUNT SALINE/INK: CPT | Mod: ZL | Performed by: FAMILY MEDICINE

## 2021-06-18 PROCEDURE — 76801 OB US < 14 WKS SINGLE FETUS: CPT

## 2021-06-18 PROCEDURE — 87491 CHLMYD TRACH DNA AMP PROBE: CPT | Mod: ZL | Performed by: FAMILY MEDICINE

## 2021-06-18 PROCEDURE — 86850 RBC ANTIBODY SCREEN: CPT | Mod: ZL | Performed by: FAMILY MEDICINE

## 2021-06-18 PROCEDURE — 81003 URINALYSIS AUTO W/O SCOPE: CPT | Mod: ZL | Performed by: FAMILY MEDICINE

## 2021-06-18 PROCEDURE — 86780 TREPONEMA PALLIDUM: CPT | Mod: ZL | Performed by: FAMILY MEDICINE

## 2021-06-18 PROCEDURE — 87340 HEPATITIS B SURFACE AG IA: CPT | Mod: ZL | Performed by: FAMILY MEDICINE

## 2021-06-18 PROCEDURE — 86901 BLOOD TYPING SEROLOGIC RH(D): CPT | Mod: ZL | Performed by: FAMILY MEDICINE

## 2021-06-18 PROCEDURE — 86762 RUBELLA ANTIBODY: CPT | Mod: ZL | Performed by: FAMILY MEDICINE

## 2021-06-18 PROCEDURE — 36415 COLL VENOUS BLD VENIPUNCTURE: CPT | Mod: ZL | Performed by: FAMILY MEDICINE

## 2021-06-18 ASSESSMENT — PAIN SCALES - GENERAL: PAINLEVEL: NO PAIN (0)

## 2021-06-18 NOTE — NURSING NOTE
"Chief Complaint   Patient presents with     Prenatal Care     physical     Patient presents for OB physical     Initial /58 (BP Location: Right arm, Patient Position: Sitting, Cuff Size: Adult Regular)   Pulse 73   Temp 98.2  F (36.8  C) (Tympanic)   Resp 16   Wt 72.6 kg (160 lb)   LMP 05/01/2021 (Exact Date)   SpO2 99%   Breastfeeding No   BMI 26.22 kg/m   Estimated body mass index is 26.22 kg/m  as calculated from the following:    Height as of 6/9/21: 1.664 m (5' 5.5\").    Weight as of this encounter: 72.6 kg (160 lb).  Medication Reconciliation: complete    Ekaterina Charlton MA  "

## 2021-06-19 LAB — T PALLIDUM AB SER QL: NONREACTIVE

## 2021-06-21 LAB
BACTERIA SPEC CULT: NORMAL
HBV SURFACE AG SERPL QL IA: NONREACTIVE
RUBV IGG SERPL IA-ACNC: 40 IU/ML
SPECIMEN SOURCE: NORMAL

## 2021-07-14 ENCOUNTER — PRENATAL OFFICE VISIT (OUTPATIENT)
Dept: FAMILY MEDICINE | Facility: OTHER | Age: 31
End: 2021-07-14
Attending: FAMILY MEDICINE
Payer: COMMERCIAL

## 2021-07-14 VITALS
WEIGHT: 152.6 LBS | TEMPERATURE: 97.8 F | RESPIRATION RATE: 16 BRPM | OXYGEN SATURATION: 97 % | HEART RATE: 95 BPM | DIASTOLIC BLOOD PRESSURE: 66 MMHG | BODY MASS INDEX: 25.01 KG/M2 | SYSTOLIC BLOOD PRESSURE: 96 MMHG

## 2021-07-14 DIAGNOSIS — Z87.59 HISTORY OF MOLAR PREGNANCY: ICD-10-CM

## 2021-07-14 DIAGNOSIS — O09.90 HIGH-RISK PREGNANCY, UNSPECIFIED TRIMESTER: Primary | ICD-10-CM

## 2021-07-14 PROCEDURE — 99207 PR OB VISIT-NO CHARGE - GICH ONLY: CPT | Performed by: FAMILY MEDICINE

## 2021-07-14 ASSESSMENT — PAIN SCALES - GENERAL: PAINLEVEL: NO PAIN (0)

## 2021-07-14 NOTE — PROGRESS NOTES
"Nursing Notes:   Ekaterina Charlton MA  2021 10:53 AM  Signed  Chief Complaint   Patient presents with     Prenatal Care     10 weeks      Patient is here for 10 week OB check    Initial BP 96/66 (BP Location: Right arm, Patient Position: Sitting, Cuff Size: Adult Regular)   Pulse 95   Temp 97.8  F (36.6  C) (Tympanic)   Resp 16   Wt 69.2 kg (152 lb 9.6 oz)   LMP 2021 (Exact Date)   SpO2 97%   Breastfeeding No   BMI 25.01 kg/m   Estimated body mass index is 25.01 kg/m  as calculated from the following:    Height as of 21: 1.664 m (5' 5.5\").    Weight as of this encounter: 69.2 kg (152 lb 9.6 oz).  Medication Reconciliation: complete    Ekaterina Charlton MA     FOOD SECURITY SCREENING QUESTIONS  Hunger Vital Signs:  Within the past 12 months we worried whether our food would run out before we got money to buy more. Never  Within the past 12 months the food we bought just didn't last and we didn't have money to get more. Never     Ekaterina Charlton MA 2021 10:48 AM        BP 96/66 (BP Location: Right arm, Patient Position: Sitting, Cuff Size: Adult Regular)   Pulse 95   Temp 97.8  F (36.6  C) (Tympanic)   Resp 16   Wt 69.2 kg (152 lb 9.6 oz)   LMP 2021 (Exact Date)   SpO2 97%   Breastfeeding No   BMI 25.01 kg/m      Jenny Hadley is a 30 year old female   at 10w4d     Wt loss noted - due to some nausea plus healthier eating habits.    Some headaches after a bad night of sleep (getting to bed late after work).      Mole/skin tag under right breast.        1.  US at 18-22 weeks.  2.  Quad screen discussed and offered: yes  - she will be doing this at 16 weeks  3.  Reviewed normal physiologic changes in mom, self cares.  4.  Discussed current fetal development.  5.  GTT, treponema and hgb at 24-28 weeks.  6.   Lab Results   Component Value Date    ABO A 2021    RH Pos 2021    [id number] A Rh Positive 2016         ICD-10-CM    1. High-risk pregnancy, " unspecified trimester  O09.90    2. History of molar pregnancy  Z87.59        RTC 4 weeks.    Pia Tong MD, FAAFP

## 2021-07-14 NOTE — NURSING NOTE
"Chief Complaint   Patient presents with     Prenatal Care     10 weeks      Patient is here for 10 week OB check    Initial BP 96/66 (BP Location: Right arm, Patient Position: Sitting, Cuff Size: Adult Regular)   Pulse 95   Temp 97.8  F (36.6  C) (Tympanic)   Resp 16   Wt 69.2 kg (152 lb 9.6 oz)   LMP 05/01/2021 (Exact Date)   SpO2 97%   Breastfeeding No   BMI 25.01 kg/m   Estimated body mass index is 25.01 kg/m  as calculated from the following:    Height as of 6/9/21: 1.664 m (5' 5.5\").    Weight as of this encounter: 69.2 kg (152 lb 9.6 oz).  Medication Reconciliation: complete    Ekaterina Charlton MA     FOOD SECURITY SCREENING QUESTIONS  Hunger Vital Signs:  Within the past 12 months we worried whether our food would run out before we got money to buy more. Never  Within the past 12 months the food we bought just didn't last and we didn't have money to get more. Never     Ekaterina Charlton MA 7/14/2021 10:48 AM    "

## 2021-08-10 ENCOUNTER — PRENATAL OFFICE VISIT (OUTPATIENT)
Dept: FAMILY MEDICINE | Facility: OTHER | Age: 31
End: 2021-08-10
Attending: FAMILY MEDICINE
Payer: COMMERCIAL

## 2021-08-10 VITALS
TEMPERATURE: 97.9 F | BODY MASS INDEX: 25.14 KG/M2 | RESPIRATION RATE: 16 BRPM | OXYGEN SATURATION: 98 % | WEIGHT: 153.4 LBS | DIASTOLIC BLOOD PRESSURE: 68 MMHG | SYSTOLIC BLOOD PRESSURE: 100 MMHG | HEART RATE: 92 BPM

## 2021-08-10 DIAGNOSIS — O09.90 HIGH-RISK PREGNANCY, UNSPECIFIED TRIMESTER: Primary | ICD-10-CM

## 2021-08-10 DIAGNOSIS — Z87.59 HISTORY OF MOLAR PREGNANCY: ICD-10-CM

## 2021-08-10 PROCEDURE — 99207 PR OB VISIT-NO CHARGE - GICH ONLY: CPT | Performed by: FAMILY MEDICINE

## 2021-08-10 ASSESSMENT — PAIN SCALES - GENERAL: PAINLEVEL: SEVERE PAIN (6)

## 2021-08-10 NOTE — NURSING NOTE
Chief Complaint   Patient presents with     Prenatal Care     No questions or concerns. Feeling well other than a few headaches. No cramping or bleeding.     Medication Reconciliation: complete    Ekaterina Oconnell LPN

## 2021-08-10 NOTE — PROGRESS NOTES
Nursing Notes:   Ekaterina Oconnell LPN  8/10/2021 10:14 AM  Signed  Chief Complaint   Patient presents with     Prenatal Care     No questions or concerns. Feeling well other than a few headaches. No cramping or bleeding.     Medication Reconciliation: complete    Ekaterina DUMONT. CARLYLE Oconnell       /68 (BP Location: Right arm, Patient Position: Sitting, Cuff Size: Adult Regular)   Pulse 92   Temp 97.9  F (36.6  C) (Tympanic)   Resp 16   Wt 69.6 kg (153 lb 6.4 oz)   LMP 2021 (Exact Date)   SpO2 98%   Breastfeeding No   BMI 25.14 kg/m      Jenny Hadley is a 30 year old female   at 14w3d   Second pregnancy significant for molar pregnancy - placenta will need to go to pathology post-delivery.      She will get nausea if she eats too fast.  Having hip pain - better with stretching.    Maybe has felt a few flutters.      HR variability from 135-150 during exam.      1.  US at 18-22 weeks.  2.  Quad screen discussed and offered: at her next visit    3.  Reviewed normal physiologic changes in mom, self cares.  4.  Discussed current fetal development.  5.  GTT, treponema and hgb at 24-28 weeks.  6.   Lab Results   Component Value Date    ABO A 2021    RH Pos 2021    [id number] A Rh Positive 2016         ICD-10-CM    1. High-risk pregnancy, unspecified trimester  O09.90    2. History of molar pregnancy  Z87.59        RTC 4 weeks.    Pia Tong MD, FAAFP

## 2021-08-31 ENCOUNTER — ALLIED HEALTH/NURSE VISIT (OUTPATIENT)
Dept: FAMILY MEDICINE | Facility: OTHER | Age: 31
End: 2021-08-31
Attending: FAMILY MEDICINE
Payer: COMMERCIAL

## 2021-08-31 DIAGNOSIS — Z20.822 COVID-19 RULED OUT: Primary | ICD-10-CM

## 2021-08-31 LAB — SARS-COV-2 RNA RESP QL NAA+PROBE: NEGATIVE

## 2021-08-31 PROCEDURE — U0005 INFEC AGEN DETEC AMPLI PROBE: HCPCS | Mod: ZL

## 2021-08-31 PROCEDURE — C9803 HOPD COVID-19 SPEC COLLECT: HCPCS

## 2021-08-31 NOTE — NURSING NOTE
Chief Complaint   Patient presents with     Covid 19 Testing     Travel       Patient swabbed for COVID-19 testing.  Rody Meade LPN on 8/31/2021 at 9:17 AM

## 2021-09-14 ENCOUNTER — HOSPITAL ENCOUNTER (OUTPATIENT)
Dept: ULTRASOUND IMAGING | Facility: OTHER | Age: 31
End: 2021-09-14
Attending: FAMILY MEDICINE
Payer: COMMERCIAL

## 2021-09-14 ENCOUNTER — PRENATAL OFFICE VISIT (OUTPATIENT)
Dept: FAMILY MEDICINE | Facility: OTHER | Age: 31
End: 2021-09-14
Attending: FAMILY MEDICINE
Payer: COMMERCIAL

## 2021-09-14 VITALS
TEMPERATURE: 98.2 F | RESPIRATION RATE: 16 BRPM | WEIGHT: 164.6 LBS | DIASTOLIC BLOOD PRESSURE: 60 MMHG | OXYGEN SATURATION: 98 % | HEART RATE: 71 BPM | SYSTOLIC BLOOD PRESSURE: 112 MMHG | BODY MASS INDEX: 26.97 KG/M2

## 2021-09-14 DIAGNOSIS — O09.90 HIGH-RISK PREGNANCY, UNSPECIFIED TRIMESTER: ICD-10-CM

## 2021-09-14 DIAGNOSIS — Z87.59 HISTORY OF MOLAR PREGNANCY: Primary | ICD-10-CM

## 2021-09-14 DIAGNOSIS — Z87.59 HISTORY OF MOLAR PREGNANCY: ICD-10-CM

## 2021-09-14 DIAGNOSIS — O09.90 HIGH-RISK PREGNANCY, UNSPECIFIED TRIMESTER: Primary | ICD-10-CM

## 2021-09-14 LAB — HOLD SPECIMEN: NORMAL

## 2021-09-14 PROCEDURE — 76805 OB US >/= 14 WKS SNGL FETUS: CPT

## 2021-09-14 PROCEDURE — 81511 FTL CGEN ABNOR FOUR ANAL: CPT | Mod: ZL | Performed by: FAMILY MEDICINE

## 2021-09-14 PROCEDURE — 99207 PR OB VISIT-NO CHARGE - GICH ONLY: CPT | Performed by: FAMILY MEDICINE

## 2021-09-14 PROCEDURE — 36415 COLL VENOUS BLD VENIPUNCTURE: CPT | Mod: ZL | Performed by: FAMILY MEDICINE

## 2021-09-14 ASSESSMENT — PAIN SCALES - GENERAL: PAINLEVEL: MILD PAIN (3)

## 2021-09-14 NOTE — PROGRESS NOTES
"Nursing Notes:   Nasima Cruz LPN  2021  2:06 PM  Signed  Chief Complaint   Patient presents with     Prenatal Care     19w3d       Initial /60 (BP Location: Right arm, Patient Position: Sitting, Cuff Size: Adult Regular)   Pulse 71   Temp 98.2  F (36.8  C) (Tympanic)   Resp 16   Wt 74.7 kg (164 lb 9.6 oz)   LMP 2021 (Exact Date)   SpO2 98%   Breastfeeding No   BMI 26.97 kg/m   Estimated body mass index is 26.97 kg/m  as calculated from the following:    Height as of 21: 1.664 m (5' 5.5\").    Weight as of this encounter: 74.7 kg (164 lb 9.6 oz).  Medication Reconciliation: complete  Nasima Cruz LPN    FOOD SECURITY SCREENING QUESTIONS  Hunger Vital Signs:  Within the past 12 months we worried whether our food would run out before we got money to buy more. Never  Within the past 12 months the food we bought just didn't last and we didn't have money to get more. Never  Nasima Cruz LPN 2021 1:52 PM       /60 (BP Location: Right arm, Patient Position: Sitting, Cuff Size: Adult Regular)   Pulse 71   Temp 98.2  F (36.8  C) (Tympanic)   Resp 16   Wt 74.7 kg (164 lb 9.6 oz)   LMP 2021 (Exact Date)   SpO2 98%   Breastfeeding No   BMI 26.97 kg/m      Jenny Hadley is a 30 year old female   at 19w3d     Is feeling movements now.    Ultrasound done today.      History of molar pregnancy, she is aware that placenta will need to go to pathology.      1.  US performed today.  Results of this are pending.  2.  Quad screen discussed and offered: would like this done today.    3.  Reviewed normal physiologic changes in mom, self cares.  4.  Discussed current fetal development.  5.  GTT, treponema and hgb at 24-28 weeks.  6.  Influenza vaccine at next visit.  Lab Results   Component Value Date    ABO A 2021    RH Pos 2021    [id number] A Rh Positive 2016         ICD-10-CM    1. History of molar pregnancy  Z87.59 Maternal Quad " Marker 2nd Trimester   2. High-risk pregnancy, unspecified trimester  O09.90        RTC 4 weeks.    Pia Tong MD, FAAFP

## 2021-09-14 NOTE — NURSING NOTE
"Chief Complaint   Patient presents with     Prenatal Care     19w3d       Initial /60 (BP Location: Right arm, Patient Position: Sitting, Cuff Size: Adult Regular)   Pulse 71   Temp 98.2  F (36.8  C) (Tympanic)   Resp 16   Wt 74.7 kg (164 lb 9.6 oz)   LMP 05/01/2021 (Exact Date)   SpO2 98%   Breastfeeding No   BMI 26.97 kg/m   Estimated body mass index is 26.97 kg/m  as calculated from the following:    Height as of 6/9/21: 1.664 m (5' 5.5\").    Weight as of this encounter: 74.7 kg (164 lb 9.6 oz).  Medication Reconciliation: complete  Nasima Cruz LPN    FOOD SECURITY SCREENING QUESTIONS  Hunger Vital Signs:  Within the past 12 months we worried whether our food would run out before we got money to buy more. Never  Within the past 12 months the food we bought just didn't last and we didn't have money to get more. Never  Nasima Cruz LPN 9/14/2021 1:52 PM    "

## 2021-10-09 ENCOUNTER — HEALTH MAINTENANCE LETTER (OUTPATIENT)
Age: 31
End: 2021-10-09

## 2021-10-13 ENCOUNTER — PRENATAL OFFICE VISIT (OUTPATIENT)
Dept: FAMILY MEDICINE | Facility: OTHER | Age: 31
End: 2021-10-13
Attending: FAMILY MEDICINE
Payer: COMMERCIAL

## 2021-10-13 ENCOUNTER — MYC MEDICAL ADVICE (OUTPATIENT)
Dept: FAMILY MEDICINE | Facility: OTHER | Age: 31
End: 2021-10-13

## 2021-10-13 VITALS
DIASTOLIC BLOOD PRESSURE: 58 MMHG | RESPIRATION RATE: 16 BRPM | BODY MASS INDEX: 27.37 KG/M2 | WEIGHT: 167 LBS | OXYGEN SATURATION: 98 % | TEMPERATURE: 98.1 F | HEART RATE: 97 BPM | SYSTOLIC BLOOD PRESSURE: 116 MMHG

## 2021-10-13 DIAGNOSIS — Z87.59 HISTORY OF MOLAR PREGNANCY: ICD-10-CM

## 2021-10-13 DIAGNOSIS — O09.90 HIGH-RISK PREGNANCY, UNSPECIFIED TRIMESTER: Primary | ICD-10-CM

## 2021-10-13 DIAGNOSIS — Z23 NEED FOR PROPHYLACTIC VACCINATION AND INOCULATION AGAINST INFLUENZA: ICD-10-CM

## 2021-10-13 PROCEDURE — 99207 PR OB VISIT-NO CHARGE - GICH ONLY: CPT | Performed by: FAMILY MEDICINE

## 2021-10-13 PROCEDURE — 90686 IIV4 VACC NO PRSV 0.5 ML IM: CPT | Performed by: FAMILY MEDICINE

## 2021-10-13 PROCEDURE — 90471 IMMUNIZATION ADMIN: CPT | Performed by: FAMILY MEDICINE

## 2021-10-13 ASSESSMENT — PAIN SCALES - GENERAL: PAINLEVEL: NO PAIN (0)

## 2021-10-13 NOTE — NURSING NOTE
"Chief Complaint   Patient presents with     Prenatal Care     23 weeks      Patient is here for 23 week OB check     Initial /58 (BP Location: Right arm, Patient Position: Sitting, Cuff Size: Adult Regular)   Pulse 97   Temp 98.1  F (36.7  C) (Tympanic)   Resp 16   Wt 75.8 kg (167 lb)   LMP 05/01/2021 (Exact Date)   SpO2 98%   BMI 27.37 kg/m   Estimated body mass index is 27.37 kg/m  as calculated from the following:    Height as of 6/9/21: 1.664 m (5' 5.5\").    Weight as of this encounter: 75.8 kg (167 lb).  Medication Reconciliation: complete    Ekaterina Charlton MA     FOOD SECURITY SCREENING QUESTIONS  Hunger Vital Signs:  Within the past 12 months we worried whether our food would run out before we got money to buy more. Never  Within the past 12 months the food we bought just didn't last and we didn't have money to get more. Never     Ekaterina Charlton MA 10/13/2021 7:47 AM    Immunization Documentation    Prior to Immunization administration, verified patients identity using patient's name and date of birth. Please see IMMUNIZATIONS  and order for additional information.  Patient / Parent instructed to remain in clinic for 15 minutes and report any adverse reaction to staff immediately.    Was the entire amount of vaccines given used? Yes    Ekaterina Charlton MA  10/13/2021   8:35 AM    "

## 2021-10-13 NOTE — PROGRESS NOTES
"Nursing Notes:   Ekaterina Charlton MA  10/13/2021  7:50 AM  Signed  Chief Complaint   Patient presents with     Prenatal Care     23 weeks      Patient is here for 23 week OB check     Initial /58 (BP Location: Right arm, Patient Position: Sitting, Cuff Size: Adult Regular)   Pulse 97   Temp 98.1  F (36.7  C) (Tympanic)   Resp 16   Wt 75.8 kg (167 lb)   LMP 2021 (Exact Date)   SpO2 98%   BMI 27.37 kg/m   Estimated body mass index is 27.37 kg/m  as calculated from the following:    Height as of 21: 1.664 m (5' 5.5\").    Weight as of this encounter: 75.8 kg (167 lb).  Medication Reconciliation: complete    Ekaterina Charlton MA     FOOD SECURITY SCREENING QUESTIONS  Hunger Vital Signs:  Within the past 12 months we worried whether our food would run out before we got money to buy more. Never  Within the past 12 months the food we bought just didn't last and we didn't have money to get more. Never     Ekaterina Charlton MA 10/13/2021 7:47 AM       /58 (BP Location: Right arm, Patient Position: Sitting, Cuff Size: Adult Regular)   Pulse 97   Temp 98.1  F (36.7  C) (Tympanic)   Resp 16   Wt 75.8 kg (167 lb)   LMP 2021 (Exact Date)   SpO2 98%   BMI 27.37 kg/m       Jenny Hadley is a 30 year old female  at 23w4d     Some heartburn symptoms - tums helps most of the time.    Flu vaccine today.    Contemplating COVID vaccine.     History of molar pregnancy:  Will need to send placenta to path    1.  US reviewed. EFW was at 98%tile.  Recheck is scheduled  2.  Labor class plans: declined  Infant feeding plans breast  3.  Reviewed normal physiologic changes in mom, self cares.  4.  Discussed current fetal development.  5.  GTT, treponema and hgb at next visit  6.    Lab Results   Component Value Date    ABO A 2021    RH Pos 2021    [id number] A Rh Positive 2016     7.  RTC 4 weeks.      ICD-10-CM    1. High-risk pregnancy, unspecified trimester  O09.90    2. History " of molar pregnancy  Z87.59    3. Need for prophylactic vaccination and inoculation against influenza  Z23        Pia Tong MD, FAAFP

## 2021-10-25 ENCOUNTER — IMMUNIZATION (OUTPATIENT)
Dept: FAMILY MEDICINE | Facility: OTHER | Age: 31
End: 2021-10-25
Attending: FAMILY MEDICINE
Payer: COMMERCIAL

## 2021-10-25 PROCEDURE — 0001A PR COVID VAC PFIZER DIL RECON 30 MCG/0.3 ML IM: CPT

## 2021-10-25 PROCEDURE — 91300 PR COVID VAC PFIZER DIL RECON 30 MCG/0.3 ML IM: CPT

## 2021-11-12 ENCOUNTER — PRENATAL OFFICE VISIT (OUTPATIENT)
Dept: FAMILY MEDICINE | Facility: OTHER | Age: 31
End: 2021-11-12
Attending: FAMILY MEDICINE
Payer: COMMERCIAL

## 2021-11-12 VITALS
BODY MASS INDEX: 29.27 KG/M2 | SYSTOLIC BLOOD PRESSURE: 120 MMHG | WEIGHT: 178.6 LBS | TEMPERATURE: 98 F | RESPIRATION RATE: 16 BRPM | OXYGEN SATURATION: 98 % | DIASTOLIC BLOOD PRESSURE: 62 MMHG | HEART RATE: 78 BPM

## 2021-11-12 DIAGNOSIS — Z87.59 HISTORY OF MOLAR PREGNANCY: ICD-10-CM

## 2021-11-12 DIAGNOSIS — O99.013 ANEMIA DURING PREGNANCY IN THIRD TRIMESTER: Primary | ICD-10-CM

## 2021-11-12 DIAGNOSIS — O09.90 HIGH-RISK PREGNANCY, UNSPECIFIED TRIMESTER: Primary | ICD-10-CM

## 2021-11-12 LAB
GLUCOSE 1H P 50 G GLC PO SERPL-MCNC: 122 MG/DL (ref 70–129)
HGB BLD-MCNC: 10.2 G/DL (ref 11.7–15.7)

## 2021-11-12 PROCEDURE — 90715 TDAP VACCINE 7 YRS/> IM: CPT | Performed by: FAMILY MEDICINE

## 2021-11-12 PROCEDURE — 82952 GTT-ADDED SAMPLES: CPT | Mod: ZL | Performed by: FAMILY MEDICINE

## 2021-11-12 PROCEDURE — 36415 COLL VENOUS BLD VENIPUNCTURE: CPT | Mod: ZL | Performed by: FAMILY MEDICINE

## 2021-11-12 PROCEDURE — 85018 HEMOGLOBIN: CPT | Mod: ZL | Performed by: FAMILY MEDICINE

## 2021-11-12 PROCEDURE — 86780 TREPONEMA PALLIDUM: CPT | Mod: ZL | Performed by: FAMILY MEDICINE

## 2021-11-12 PROCEDURE — 99207 PR OB VISIT-NO CHARGE - GICH ONLY: CPT | Performed by: FAMILY MEDICINE

## 2021-11-12 PROCEDURE — 90471 IMMUNIZATION ADMIN: CPT | Performed by: FAMILY MEDICINE

## 2021-11-12 RX ORDER — FERROUS GLUCONATE 324(38)MG
324 TABLET ORAL
Qty: 100 TABLET | Refills: 1 | Status: SHIPPED | OUTPATIENT
Start: 2021-11-12 | End: 2022-02-22

## 2021-11-12 ASSESSMENT — PAIN SCALES - GENERAL: PAINLEVEL: NO PAIN (0)

## 2021-11-12 NOTE — PROGRESS NOTES
"Nursing Notes:   Ekaterina Charlton MA  2021 10:15 AM  Signed  Chief Complaint   Patient presents with     Prenatal Care     27 weeks      Patient is here for 27 week OB check     Initial /62 (BP Location: Right arm, Patient Position: Sitting, Cuff Size: Adult Regular)   Pulse 78   Temp 98  F (36.7  C) (Tympanic)   Resp 16   Wt 81 kg (178 lb 9.6 oz)   LMP 2021 (Exact Date)   SpO2 98%   BMI 29.27 kg/m   Estimated body mass index is 29.27 kg/m  as calculated from the following:    Height as of 21: 1.664 m (5' 5.5\").    Weight as of this encounter: 81 kg (178 lb 9.6 oz).  Medication Reconciliation: complete    Ekaterina Charlton MA     FOOD SECURITY SCREENING QUESTIONS  Hunger Vital Signs:  Within the past 12 months we worried whether our food would run out before we got money to buy more. Never  Within the past 12 months the food we bought just didn't last and we didn't have money to get more. Never     Ekaterina Charlton MA 2021 10:12 AM       /62 (BP Location: Right arm, Patient Position: Sitting, Cuff Size: Adult Regular)   Pulse 78   Temp 98  F (36.7  C) (Tympanic)   Resp 16   Wt 81 kg (178 lb 9.6 oz)   LMP 2021 (Exact Date)   SpO2 98%   BMI 29.27 kg/m     Jenny Hadley is a 31 year old female   27w6d    Flu vaccine is completed.  She's had her first COVID vaccine.  Hemoglobin, treponema and Glucose test being done today.  Lab Results   Component Value Date    ABO A 2021    RH Pos 2021    [id number] A Rh Positive 2016     Discussed Boostrix update - and immunization updated.  The \"Information of Consent for Operative Vaginal Delivery\" form was reviewed at today'svisit.  The patient will be prepared to sign the consent at the time of hospital admission.    Kick count handout given.  PP birth control: declined/NFP   Reviewed signs and symptoms of PTL, preeclampsia  Currently novaginal discharge, bleeding cramping.  Follow up appointment " schedule for last trimester reviewed.      ICD-10-CM    1. High-risk pregnancy, unspecified trimester  O09.90 TDAP VACCINE (Adacel, Boostrix)  [[id number]]     Glucose tolerance, gest screen, 1 hour     Hemoglobin     Treponema Ab w Reflex to RPR and Titer   2. History of molar pregnancy  Z87.59        Pia Tong MD

## 2021-11-12 NOTE — NURSING NOTE
"Chief Complaint   Patient presents with     Prenatal Care     27 weeks      Patient is here for 27 week OB check     Initial /62 (BP Location: Right arm, Patient Position: Sitting, Cuff Size: Adult Regular)   Pulse 78   Temp 98  F (36.7  C) (Tympanic)   Resp 16   Wt 81 kg (178 lb 9.6 oz)   LMP 05/01/2021 (Exact Date)   SpO2 98%   BMI 29.27 kg/m   Estimated body mass index is 29.27 kg/m  as calculated from the following:    Height as of 6/9/21: 1.664 m (5' 5.5\").    Weight as of this encounter: 81 kg (178 lb 9.6 oz).  Medication Reconciliation: complete    Ekaterina Charlton MA     FOOD SECURITY SCREENING QUESTIONS  Hunger Vital Signs:  Within the past 12 months we worried whether our food would run out before we got money to buy more. Never  Within the past 12 months the food we bought just didn't last and we didn't have money to get more. Never     Ekaterina Charlton MA 11/12/2021 10:12 AM    Immunization Documentation    Prior to Immunization administration, verified patients identity using patient's name and date of birth. Please see IMMUNIZATIONS  and order for additional information.  Patient / Parent instructed to remain in clinic for 15 minutes and report any adverse reaction to staff immediately.    Was the entire amount of vaccines given used? Yes    Ekaterina Charlton MA  11/12/2021   10:47 AM      "

## 2021-11-13 LAB — T PALLIDUM AB SER QL: NONREACTIVE

## 2021-11-15 ENCOUNTER — HOSPITAL ENCOUNTER (OUTPATIENT)
Dept: ULTRASOUND IMAGING | Facility: OTHER | Age: 31
End: 2021-11-15
Attending: FAMILY MEDICINE
Payer: COMMERCIAL

## 2021-11-15 ENCOUNTER — MYC MEDICAL ADVICE (OUTPATIENT)
Dept: FAMILY MEDICINE | Facility: OTHER | Age: 31
End: 2021-11-15

## 2021-11-15 ENCOUNTER — IMMUNIZATION (OUTPATIENT)
Dept: FAMILY MEDICINE | Facility: OTHER | Age: 31
End: 2021-11-15
Attending: FAMILY MEDICINE
Payer: COMMERCIAL

## 2021-11-15 DIAGNOSIS — O09.90 HIGH-RISK PREGNANCY, UNSPECIFIED TRIMESTER: Primary | ICD-10-CM

## 2021-11-15 DIAGNOSIS — O09.90 HIGH-RISK PREGNANCY, UNSPECIFIED TRIMESTER: ICD-10-CM

## 2021-11-15 PROCEDURE — 0002A PR COVID VAC PFIZER DIL RECON 30 MCG/0.3 ML IM: CPT

## 2021-11-15 PROCEDURE — 91300 PR COVID VAC PFIZER DIL RECON 30 MCG/0.3 ML IM: CPT

## 2021-11-15 PROCEDURE — 76816 OB US FOLLOW-UP PER FETUS: CPT

## 2021-12-09 NOTE — PROGRESS NOTES
"Nursing Notes:   Ekaterina Charlton MA  12/10/2021 10:09 AM  Signed  Chief Complaint   Patient presents with     Prenatal Care     31 weeks      Patient is here for 31 week OB check    Initial /60 (BP Location: Right arm, Patient Position: Sitting, Cuff Size: Adult Regular)   Pulse 98   Temp 98.4  F (36.9  C) (Tympanic)   Resp 16   Wt 82.8 kg (182 lb 9.6 oz)   LMP 2021 (Exact Date)   SpO2 98%   BMI 29.92 kg/m   Estimated body mass index is 29.92 kg/m  as calculated from the following:    Height as of 21: 1.664 m (5' 5.5\").    Weight as of this encounter: 82.8 kg (182 lb 9.6 oz).  Medication Reconciliation: complete    Ekaterina Charlton MA       FOOD SECURITY SCREENING QUESTIONS:    The next two questions are to help us understand your food security.  If you are feeling you need any assistance in this area, we have resources available to support you today.    Hunger Vital Signs:  Within the past 12 months we worried whether our food would run out before we got money to buy more. Never  Within the past 12 months the food we bought just didn't last and we didn't have money to get more. Never     Ekaterina Charlton MA,LPN on 12/10/2021 at 10:00 AM           Jenny Hadley is a 31 year old female  at 31w6d     Anemia - she is on additional iron - nausea and constipation from this - started on stool softener    History of molar pregnancy - will need to send placenta to pathology.      Ultrasound scheduled 22    GBS at 35-36 weeks.  Hemoglobin (g/dL)   Date Value   2021 10.2 (L)   2021 13.0      Lab Results   Component Value Date    ABO A 2021    RH Pos 2021    [id number] A Rh Positive 2016       LMP 2021 (Exact Date)        ICD-10-CM    1. High-risk pregnancy, unspecified trimester  O09.90    2. History of molar pregnancy  Z87.59    3. Anemia during pregnancy in third trimester  O99.013        Reviewed signs and symptoms of labor and preeclampsia.  [facility] # " [facility] reviewed.  Continue activity as tolerated.  Reviewed selfcare and end of pregnancy comfort measures.  Discussed infant care, medications and procedures in first 48 hours post-delivery.    PP birth control: NFP   Immunizations reviewed: Boostrix, Covid, and flu vaccines are up-to-date  Follow up in 2 weeks      Pia Tong MD

## 2021-12-10 ENCOUNTER — PRENATAL OFFICE VISIT (OUTPATIENT)
Dept: FAMILY MEDICINE | Facility: OTHER | Age: 31
End: 2021-12-10
Attending: FAMILY MEDICINE
Payer: COMMERCIAL

## 2021-12-10 VITALS
RESPIRATION RATE: 16 BRPM | SYSTOLIC BLOOD PRESSURE: 116 MMHG | DIASTOLIC BLOOD PRESSURE: 60 MMHG | HEART RATE: 98 BPM | BODY MASS INDEX: 29.92 KG/M2 | WEIGHT: 182.6 LBS | TEMPERATURE: 98.4 F | OXYGEN SATURATION: 98 %

## 2021-12-10 DIAGNOSIS — O09.90 HIGH-RISK PREGNANCY, UNSPECIFIED TRIMESTER: Primary | ICD-10-CM

## 2021-12-10 DIAGNOSIS — O99.013 ANEMIA DURING PREGNANCY IN THIRD TRIMESTER: ICD-10-CM

## 2021-12-10 DIAGNOSIS — Z87.59 HISTORY OF MOLAR PREGNANCY: ICD-10-CM

## 2021-12-10 PROCEDURE — 99207 PR OB VISIT-NO CHARGE - GICH ONLY: CPT | Performed by: FAMILY MEDICINE

## 2021-12-10 ASSESSMENT — PAIN SCALES - GENERAL: PAINLEVEL: NO PAIN (0)

## 2021-12-10 NOTE — NURSING NOTE
"Chief Complaint   Patient presents with     Prenatal Care     31 weeks      Patient is here for 31 week OB check    Initial /60 (BP Location: Right arm, Patient Position: Sitting, Cuff Size: Adult Regular)   Pulse 98   Temp 98.4  F (36.9  C) (Tympanic)   Resp 16   Wt 82.8 kg (182 lb 9.6 oz)   LMP 05/01/2021 (Exact Date)   SpO2 98%   BMI 29.92 kg/m   Estimated body mass index is 29.92 kg/m  as calculated from the following:    Height as of 6/9/21: 1.664 m (5' 5.5\").    Weight as of this encounter: 82.8 kg (182 lb 9.6 oz).  Medication Reconciliation: complete    Ekaterina Charlton MA       FOOD SECURITY SCREENING QUESTIONS:    The next two questions are to help us understand your food security.  If you are feeling you need any assistance in this area, we have resources available to support you today.    Hunger Vital Signs:  Within the past 12 months we worried whether our food would run out before we got money to buy more. Never  Within the past 12 months the food we bought just didn't last and we didn't have money to get more. Never     Ekaterina Charlton MA,LPN on 12/10/2021 at 10:00 AM      "

## 2021-12-21 NOTE — PROGRESS NOTES
"Nursing Notes:   Nursing Notes:   Ekaterina Charlton MA  2021  9:11 AM  Signed  Chief Complaint   Patient presents with     Prenatal Care     33 weeks      Patient is here for 33 week OB check     Initial BP 98/68 (BP Location: Right arm, Patient Position: Sitting, Cuff Size: Adult Regular)   Pulse 116   Temp 97.4  F (36.3  C) (Tympanic)   Resp 16   Wt 83.8 kg (184 lb 12.8 oz)   LMP 2021 (Exact Date)   SpO2 98%   BMI 30.28 kg/m   Estimated body mass index is 30.28 kg/m  as calculated from the following:    Height as of 21: 1.664 m (5' 5.5\").    Weight as of this encounter: 83.8 kg (184 lb 12.8 oz).  Medication Reconciliation: complete    Ekaterina Charlton MA       FOOD SECURITY SCREENING QUESTIONS:    The next two questions are to help us understand your food security.  If you are feeling you need any assistance in this area, we have resources available to support you today.    Hunger Vital Signs:  Within the past 12 months we worried whether our food would run out before we got money to buy more. Never  Within the past 12 months the food we bought just didn't last and we didn't have money to get more. Never     Ekaterina Charlton MA,LPN on 2021 at 9:07 AM             Jenny Hadley is a 31 year old female  at 33w4d     Some leg cramps/calves     Anemia - she is on additional iron - nausea and constipation from this - started on stool softener    History of molar pregnancy - will need to send placenta to pathology.      Ultrasound scheduled 22    GBS at 35-36 weeks.  Hemoglobin (g/dL)   Date Value   2021 10.2 (L)   2021 13.0      Lab Results   Component Value Date    ABO A 2021    RH Pos 2021    [id number] A Rh Positive 2016       LMP 2021 (Exact Date)        ICD-10-CM    1. High-risk pregnancy, unspecified trimester  O09.90    2. History of molar pregnancy  Z87.59        Reviewed signs and symptoms of labor and preeclampsia.  [facility].  Continue activity as tolerated.  Reviewed selfcare and end of pregnancy comfort measures.  Discussed infant care, medications and procedures in first 48 hours post-delivery.    PP birth control: NFP   Immunizations reviewed: Boostrix, Covid, and flu vaccines are up-to-date  Follow up in 2 weeks      Pia Tong MD

## 2021-12-22 ENCOUNTER — PRENATAL OFFICE VISIT (OUTPATIENT)
Dept: FAMILY MEDICINE | Facility: OTHER | Age: 31
End: 2021-12-22
Attending: FAMILY MEDICINE
Payer: COMMERCIAL

## 2021-12-22 VITALS
WEIGHT: 184.8 LBS | HEART RATE: 116 BPM | BODY MASS INDEX: 30.28 KG/M2 | RESPIRATION RATE: 16 BRPM | TEMPERATURE: 97.4 F | SYSTOLIC BLOOD PRESSURE: 98 MMHG | DIASTOLIC BLOOD PRESSURE: 68 MMHG | OXYGEN SATURATION: 98 %

## 2021-12-22 DIAGNOSIS — O09.90 HIGH-RISK PREGNANCY, UNSPECIFIED TRIMESTER: Primary | ICD-10-CM

## 2021-12-22 DIAGNOSIS — Z87.59 HISTORY OF MOLAR PREGNANCY: ICD-10-CM

## 2021-12-22 PROCEDURE — 99207 PR OB VISIT-NO CHARGE - GICH ONLY: CPT | Performed by: FAMILY MEDICINE

## 2021-12-22 NOTE — NURSING NOTE
"Chief Complaint   Patient presents with     Prenatal Care     33 weeks      Patient is here for 33 week OB check     Initial BP 98/68 (BP Location: Right arm, Patient Position: Sitting, Cuff Size: Adult Regular)   Pulse 116   Temp 97.4  F (36.3  C) (Tympanic)   Resp 16   Wt 83.8 kg (184 lb 12.8 oz)   LMP 05/01/2021 (Exact Date)   SpO2 98%   BMI 30.28 kg/m   Estimated body mass index is 30.28 kg/m  as calculated from the following:    Height as of 6/9/21: 1.664 m (5' 5.5\").    Weight as of this encounter: 83.8 kg (184 lb 12.8 oz).  Medication Reconciliation: complete    Ekaterina Charlton MA       FOOD SECURITY SCREENING QUESTIONS:    The next two questions are to help us understand your food security.  If you are feeling you need any assistance in this area, we have resources available to support you today.    Hunger Vital Signs:  Within the past 12 months we worried whether our food would run out before we got money to buy more. Never  Within the past 12 months the food we bought just didn't last and we didn't have money to get more. Never     Ekaterina Charlton MA,LPN on 12/22/2021 at 9:07 AM      "

## 2022-01-04 NOTE — PROGRESS NOTES
"Nursing Notes:   Ekaterina Charlton MA  2022  9:09 AM  Signed  Chief Complaint   Patient presents with     Prenatal Care     35 weeks      Patient is here for 35 week OB check     Initial /66 (BP Location: Right arm, Patient Position: Sitting, Cuff Size: Adult Regular)   Pulse 103   Temp 97.1  F (36.2  C) (Tympanic)   Resp 18   Wt 86.3 kg (190 lb 3.2 oz)   LMP 2021 (Exact Date)   SpO2 98%   BMI 31.17 kg/m   Estimated body mass index is 31.17 kg/m  as calculated from the following:    Height as of 21: 1.664 m (5' 5.5\").    Weight as of this encounter: 86.3 kg (190 lb 3.2 oz).  Medication Reconciliation: complete    Ekaterina Charlton MA       FOOD SECURITY SCREENING QUESTIONS:    The next two questions are to help us understand your food security.  If you are feeling you need any assistance in this area, we have resources available to support you today.    Hunger Vital Signs:  Within the past 12 months we worried whether our food would run out before we got money to buy more. Never  Within the past 12 months the food we bought just didn't last and we didn't have money to get more. Never  Ekaterina Charlton MA,LPN on 2022 at 9:03 AM         /66 (BP Location: Right arm, Patient Position: Sitting, Cuff Size: Adult Regular)   Pulse 103   Temp 97.1  F (36.2  C) (Tympanic)   Resp 18   Wt 86.3 kg (190 lb 3.2 oz)   LMP 2021 (Exact Date)   SpO2 98%   BMI 31.17 kg/m      Jenny Hadley is a 31 year old female   at 35w4d    Some cramping and contractions. No bleeding or spotting.      GBS obtained today  Recheck of hemoglobin today.  Patient will need placenta sent to pathology after delivery due to history of molar pregnancy.   Hemoglobin   Date Value Ref Range Status   2021 10.2 (L) 11.7 - 15.7 g/dL Final   2021 13.0 11.7 - 15.7 g/dL Final   ]    Lab Results   Component Value Date    ABO A 2021    RH Pos 2021    [id number] A Rh Positive 2016 "         ICD-10-CM    1. High-risk pregnancy, unspecified trimester  O09.90 Rectal/Vag Group B Strep Culture     Hemoglobin   2. History of molar pregnancy  Z87.59    3. Anemia during pregnancy in third trimester  O99.013 Hemoglobin       Reviewed signs andsymptoms of labor and preeclampsia.  [facility] # [facility] reviewed.  Continue activity as tolerated.  Reviewed self care and end of pregnancy comfort measures.  PP birth control: vasectomy or nothing  Follow up in a week.  Pia Tong MD

## 2022-01-05 ENCOUNTER — PRENATAL OFFICE VISIT (OUTPATIENT)
Dept: FAMILY MEDICINE | Facility: OTHER | Age: 32
End: 2022-01-05
Attending: FAMILY MEDICINE
Payer: COMMERCIAL

## 2022-01-05 ENCOUNTER — HOSPITAL ENCOUNTER (OUTPATIENT)
Dept: ULTRASOUND IMAGING | Facility: OTHER | Age: 32
End: 2022-01-05
Attending: FAMILY MEDICINE
Payer: COMMERCIAL

## 2022-01-05 VITALS
DIASTOLIC BLOOD PRESSURE: 66 MMHG | RESPIRATION RATE: 18 BRPM | TEMPERATURE: 97.1 F | BODY MASS INDEX: 31.17 KG/M2 | WEIGHT: 190.2 LBS | OXYGEN SATURATION: 98 % | HEART RATE: 103 BPM | SYSTOLIC BLOOD PRESSURE: 108 MMHG

## 2022-01-05 DIAGNOSIS — Z87.59 HISTORY OF MOLAR PREGNANCY: ICD-10-CM

## 2022-01-05 DIAGNOSIS — O99.013 ANEMIA DURING PREGNANCY IN THIRD TRIMESTER: ICD-10-CM

## 2022-01-05 DIAGNOSIS — O09.90 HIGH-RISK PREGNANCY, UNSPECIFIED TRIMESTER: ICD-10-CM

## 2022-01-05 DIAGNOSIS — O09.90 HIGH-RISK PREGNANCY, UNSPECIFIED TRIMESTER: Primary | ICD-10-CM

## 2022-01-05 LAB — HGB BLD-MCNC: 10 G/DL (ref 11.7–15.7)

## 2022-01-05 PROCEDURE — 99207 PR OB VISIT-NO CHARGE - GICH ONLY: CPT | Performed by: FAMILY MEDICINE

## 2022-01-05 PROCEDURE — 87081 CULTURE SCREEN ONLY: CPT | Mod: ZL | Performed by: FAMILY MEDICINE

## 2022-01-05 PROCEDURE — 36415 COLL VENOUS BLD VENIPUNCTURE: CPT | Mod: ZL | Performed by: FAMILY MEDICINE

## 2022-01-05 PROCEDURE — 85018 HEMOGLOBIN: CPT | Mod: ZL | Performed by: FAMILY MEDICINE

## 2022-01-05 PROCEDURE — 87653 STREP B DNA AMP PROBE: CPT | Mod: ZL | Performed by: FAMILY MEDICINE

## 2022-01-05 PROCEDURE — 76816 OB US FOLLOW-UP PER FETUS: CPT

## 2022-01-05 ASSESSMENT — PAIN SCALES - GENERAL: PAINLEVEL: NO PAIN (0)

## 2022-01-05 NOTE — NURSING NOTE
"Chief Complaint   Patient presents with     Prenatal Care     35 weeks      Patient is here for 35 week OB check     Initial /66 (BP Location: Right arm, Patient Position: Sitting, Cuff Size: Adult Regular)   Pulse 103   Temp 97.1  F (36.2  C) (Tympanic)   Resp 18   Wt 86.3 kg (190 lb 3.2 oz)   LMP 05/01/2021 (Exact Date)   SpO2 98%   BMI 31.17 kg/m   Estimated body mass index is 31.17 kg/m  as calculated from the following:    Height as of 6/9/21: 1.664 m (5' 5.5\").    Weight as of this encounter: 86.3 kg (190 lb 3.2 oz).  Medication Reconciliation: complete    Ekaterina Charlton MA       FOOD SECURITY SCREENING QUESTIONS:    The next two questions are to help us understand your food security.  If you are feeling you need any assistance in this area, we have resources available to support you today.    Hunger Vital Signs:  Within the past 12 months we worried whether our food would run out before we got money to buy more. Never  Within the past 12 months the food we bought just didn't last and we didn't have money to get more. Never  Ekaterina Charlton MA,LPN on 1/5/2022 at 9:03 AM      "

## 2022-01-06 LAB
GP B STREP DNA SPEC QL NAA+PROBE: POSITIVE
PATIENT PENICILLIN, AMOXICILLIN, CEPHALOSPORINS ALLERGY: ABNORMAL

## 2022-01-07 ENCOUNTER — MYC MEDICAL ADVICE (OUTPATIENT)
Dept: FAMILY MEDICINE | Facility: OTHER | Age: 32
End: 2022-01-07
Payer: COMMERCIAL

## 2022-01-10 ENCOUNTER — ANESTHESIA EVENT (OUTPATIENT)
Dept: OBGYN | Facility: OTHER | Age: 32
End: 2022-01-10
Payer: COMMERCIAL

## 2022-01-10 ENCOUNTER — HOSPITAL ENCOUNTER (INPATIENT)
Facility: OTHER | Age: 32
LOS: 3 days | Discharge: HOME | End: 2022-01-13
Attending: FAMILY MEDICINE | Admitting: FAMILY MEDICINE
Payer: COMMERCIAL

## 2022-01-10 ENCOUNTER — ANESTHESIA (OUTPATIENT)
Dept: OBGYN | Facility: OTHER | Age: 32
End: 2022-01-10
Payer: COMMERCIAL

## 2022-01-10 DIAGNOSIS — Z37.9 NORMAL LABOR: Primary | ICD-10-CM

## 2022-01-10 PROBLEM — Z36.89 ENCOUNTER FOR TRIAGE IN PREGNANT PATIENT: Status: ACTIVE | Noted: 2022-01-10

## 2022-01-10 LAB
A1 MICROGLOB PLACENTAL VAG QL: POSITIVE
ABO/RH(D): NORMAL
ANTIBODY SCREEN: NEGATIVE
BASOPHILS # BLD AUTO: 0 10E3/UL (ref 0–0.2)
BASOPHILS NFR BLD AUTO: 0 %
EOSINOPHIL # BLD AUTO: 0.1 10E3/UL (ref 0–0.7)
EOSINOPHIL NFR BLD AUTO: 1 %
ERYTHROCYTE [DISTWIDTH] IN BLOOD BY AUTOMATED COUNT: 14.4 % (ref 10–15)
HCT VFR BLD AUTO: 32.3 % (ref 35–47)
HGB BLD-MCNC: 10.1 G/DL (ref 11.7–15.7)
IMM GRANULOCYTES # BLD: 0.1 10E3/UL
IMM GRANULOCYTES NFR BLD: 1 %
LYMPHOCYTES # BLD AUTO: 1.7 10E3/UL (ref 0.8–5.3)
LYMPHOCYTES NFR BLD AUTO: 20 %
MCH RBC QN AUTO: 25.7 PG (ref 26.5–33)
MCHC RBC AUTO-ENTMCNC: 31.3 G/DL (ref 31.5–36.5)
MCV RBC AUTO: 82 FL (ref 78–100)
MONOCYTES # BLD AUTO: 0.7 10E3/UL (ref 0–1.3)
MONOCYTES NFR BLD AUTO: 7 %
NEUTROPHILS # BLD AUTO: 6.3 10E3/UL (ref 1.6–8.3)
NEUTROPHILS NFR BLD AUTO: 71 %
NRBC # BLD AUTO: 0 10E3/UL
NRBC BLD AUTO-RTO: 0 /100
PLATELET # BLD AUTO: 225 10E3/UL (ref 150–450)
RBC # BLD AUTO: 3.93 10E6/UL (ref 3.8–5.2)
SARS-COV-2 RNA RESP QL NAA+PROBE: NEGATIVE
SPECIMEN EXPIRATION DATE: NORMAL
WBC # BLD AUTO: 8.9 10E3/UL (ref 4–11)

## 2022-01-10 PROCEDURE — 250N000011 HC RX IP 250 OP 636: Performed by: FAMILY MEDICINE

## 2022-01-10 PROCEDURE — 250N000009 HC RX 250: Performed by: FAMILY MEDICINE

## 2022-01-10 PROCEDURE — 86901 BLOOD TYPING SEROLOGIC RH(D): CPT | Performed by: FAMILY MEDICINE

## 2022-01-10 PROCEDURE — G0463 HOSPITAL OUTPT CLINIC VISIT: HCPCS | Mod: 25

## 2022-01-10 PROCEDURE — U0003 INFECTIOUS AGENT DETECTION BY NUCLEIC ACID (DNA OR RNA); SEVERE ACUTE RESPIRATORY SYNDROME CORONAVIRUS 2 (SARS-COV-2) (CORONAVIRUS DISEASE [COVID-19]), AMPLIFIED PROBE TECHNIQUE, MAKING USE OF HIGH THROUGHPUT TECHNOLOGIES AS DESCRIBED BY CMS-2020-01-R: HCPCS | Performed by: FAMILY MEDICINE

## 2022-01-10 PROCEDURE — 250N000011 HC RX IP 250 OP 636: Performed by: NURSE ANESTHETIST, CERTIFIED REGISTERED

## 2022-01-10 PROCEDURE — 85025 COMPLETE CBC W/AUTO DIFF WBC: CPT | Performed by: FAMILY MEDICINE

## 2022-01-10 PROCEDURE — 84112 EVAL AMNIOTIC FLUID PROTEIN: CPT | Performed by: FAMILY MEDICINE

## 2022-01-10 PROCEDURE — 86850 RBC ANTIBODY SCREEN: CPT | Performed by: FAMILY MEDICINE

## 2022-01-10 PROCEDURE — 250N000009 HC RX 250: Performed by: NURSE ANESTHETIST, CERTIFIED REGISTERED

## 2022-01-10 PROCEDURE — 250N000013 HC RX MED GY IP 250 OP 250 PS 637: Performed by: FAMILY MEDICINE

## 2022-01-10 PROCEDURE — 258N000003 HC RX IP 258 OP 636: Performed by: NURSE ANESTHETIST, CERTIFIED REGISTERED

## 2022-01-10 PROCEDURE — 370N000003 HC ANESTHESIA WARD SERVICE

## 2022-01-10 PROCEDURE — 120N000001 HC R&B MED SURG/OB

## 2022-01-10 PROCEDURE — 258N000003 HC RX IP 258 OP 636: Performed by: FAMILY MEDICINE

## 2022-01-10 PROCEDURE — 59400 OBSTETRICAL CARE: CPT | Performed by: NURSE ANESTHETIST, CERTIFIED REGISTERED

## 2022-01-10 PROCEDURE — 36415 COLL VENOUS BLD VENIPUNCTURE: CPT | Performed by: FAMILY MEDICINE

## 2022-01-10 PROCEDURE — 86780 TREPONEMA PALLIDUM: CPT | Performed by: FAMILY MEDICINE

## 2022-01-10 RX ORDER — ONDANSETRON 4 MG/1
4 TABLET, ORALLY DISINTEGRATING ORAL EVERY 6 HOURS PRN
Status: DISCONTINUED | OUTPATIENT
Start: 2022-01-10 | End: 2022-01-11 | Stop reason: HOSPADM

## 2022-01-10 RX ORDER — NALOXONE HYDROCHLORIDE 0.4 MG/ML
0.2 INJECTION, SOLUTION INTRAMUSCULAR; INTRAVENOUS; SUBCUTANEOUS
Status: DISCONTINUED | OUTPATIENT
Start: 2022-01-10 | End: 2022-01-11 | Stop reason: HOSPADM

## 2022-01-10 RX ORDER — METOCLOPRAMIDE HYDROCHLORIDE 5 MG/ML
10 INJECTION INTRAMUSCULAR; INTRAVENOUS EVERY 6 HOURS PRN
Status: DISCONTINUED | OUTPATIENT
Start: 2022-01-10 | End: 2022-01-11 | Stop reason: HOSPADM

## 2022-01-10 RX ORDER — PROCHLORPERAZINE MALEATE 10 MG
10 TABLET ORAL EVERY 6 HOURS PRN
Status: DISCONTINUED | OUTPATIENT
Start: 2022-01-10 | End: 2022-01-11 | Stop reason: HOSPADM

## 2022-01-10 RX ORDER — TRANEXAMIC ACID 10 MG/ML
1 INJECTION, SOLUTION INTRAVENOUS EVERY 30 MIN PRN
Status: DISCONTINUED | OUTPATIENT
Start: 2022-01-10 | End: 2022-01-11 | Stop reason: HOSPADM

## 2022-01-10 RX ORDER — OXYTOCIN/0.9 % SODIUM CHLORIDE 30/500 ML
340 PLASTIC BAG, INJECTION (ML) INTRAVENOUS CONTINUOUS PRN
Status: DISCONTINUED | OUTPATIENT
Start: 2022-01-10 | End: 2022-01-11 | Stop reason: HOSPADM

## 2022-01-10 RX ORDER — PENICILLIN G 3000000 [IU]/50ML
3 INJECTION, SOLUTION INTRAVENOUS EVERY 4 HOURS
Status: DISCONTINUED | OUTPATIENT
Start: 2022-01-10 | End: 2022-01-11 | Stop reason: HOSPADM

## 2022-01-10 RX ORDER — CARBOPROST TROMETHAMINE 250 UG/ML
250 INJECTION, SOLUTION INTRAMUSCULAR
Status: DISCONTINUED | OUTPATIENT
Start: 2022-01-10 | End: 2022-01-11 | Stop reason: HOSPADM

## 2022-01-10 RX ORDER — KETOROLAC TROMETHAMINE 30 MG/ML
30 INJECTION, SOLUTION INTRAMUSCULAR; INTRAVENOUS
Status: DISCONTINUED | OUTPATIENT
Start: 2022-01-10 | End: 2022-01-13 | Stop reason: HOSPADM

## 2022-01-10 RX ORDER — LIDOCAINE 40 MG/G
CREAM TOPICAL
Status: DISCONTINUED | OUTPATIENT
Start: 2022-01-10 | End: 2022-01-11 | Stop reason: HOSPADM

## 2022-01-10 RX ORDER — PROCHLORPERAZINE 25 MG
25 SUPPOSITORY, RECTAL RECTAL EVERY 12 HOURS PRN
Status: DISCONTINUED | OUTPATIENT
Start: 2022-01-10 | End: 2022-01-11 | Stop reason: HOSPADM

## 2022-01-10 RX ORDER — OXYTOCIN 10 [USP'U]/ML
10 INJECTION, SOLUTION INTRAMUSCULAR; INTRAVENOUS
Status: DISCONTINUED | OUTPATIENT
Start: 2022-01-10 | End: 2022-01-13 | Stop reason: HOSPADM

## 2022-01-10 RX ORDER — MISOPROSTOL 100 UG/1
400 TABLET ORAL
Status: DISCONTINUED | OUTPATIENT
Start: 2022-01-10 | End: 2022-01-11 | Stop reason: HOSPADM

## 2022-01-10 RX ORDER — NALBUPHINE HYDROCHLORIDE 10 MG/ML
2.5-5 INJECTION, SOLUTION INTRAMUSCULAR; INTRAVENOUS; SUBCUTANEOUS EVERY 6 HOURS PRN
Status: DISCONTINUED | OUTPATIENT
Start: 2022-01-10 | End: 2022-01-13 | Stop reason: HOSPADM

## 2022-01-10 RX ORDER — METHYLERGONOVINE MALEATE 0.2 MG/ML
200 INJECTION INTRAVENOUS
Status: DISCONTINUED | OUTPATIENT
Start: 2022-01-10 | End: 2022-01-11 | Stop reason: HOSPADM

## 2022-01-10 RX ORDER — NALOXONE HYDROCHLORIDE 0.4 MG/ML
0.4 INJECTION, SOLUTION INTRAMUSCULAR; INTRAVENOUS; SUBCUTANEOUS
Status: DISCONTINUED | OUTPATIENT
Start: 2022-01-10 | End: 2022-01-11 | Stop reason: HOSPADM

## 2022-01-10 RX ORDER — LIDOCAINE 40 MG/G
CREAM TOPICAL
Status: DISCONTINUED | OUTPATIENT
Start: 2022-01-10 | End: 2022-01-10 | Stop reason: HOSPADM

## 2022-01-10 RX ORDER — OXYTOCIN 10 [USP'U]/ML
10 INJECTION, SOLUTION INTRAMUSCULAR; INTRAVENOUS
Status: DISCONTINUED | OUTPATIENT
Start: 2022-01-10 | End: 2022-01-11 | Stop reason: HOSPADM

## 2022-01-10 RX ORDER — METOCLOPRAMIDE 10 MG/1
10 TABLET ORAL EVERY 6 HOURS PRN
Status: DISCONTINUED | OUTPATIENT
Start: 2022-01-10 | End: 2022-01-11 | Stop reason: HOSPADM

## 2022-01-10 RX ORDER — FENTANYL CITRATE-0.9 % NACL/PF 10 MCG/ML
100 PLASTIC BAG, INJECTION (ML) INTRAVENOUS EVERY 5 MIN PRN
Status: DISCONTINUED | OUTPATIENT
Start: 2022-01-10 | End: 2022-01-11 | Stop reason: HOSPADM

## 2022-01-10 RX ORDER — SODIUM CHLORIDE, SODIUM LACTATE, POTASSIUM CHLORIDE, CALCIUM CHLORIDE 600; 310; 30; 20 MG/100ML; MG/100ML; MG/100ML; MG/100ML
INJECTION, SOLUTION INTRAVENOUS CONTINUOUS PRN
Status: DISCONTINUED | OUTPATIENT
Start: 2022-01-10 | End: 2022-01-11 | Stop reason: HOSPADM

## 2022-01-10 RX ORDER — LIDOCAINE HYDROCHLORIDE 10 MG/ML
INJECTION, SOLUTION INFILTRATION; PERINEURAL PRN
Status: DISCONTINUED | OUTPATIENT
Start: 2022-01-10 | End: 2022-01-11

## 2022-01-10 RX ORDER — LIDOCAINE HYDROCHLORIDE AND EPINEPHRINE 15; 5 MG/ML; UG/ML
3 INJECTION, SOLUTION EPIDURAL
Status: COMPLETED | OUTPATIENT
Start: 2022-01-10 | End: 2022-01-10

## 2022-01-10 RX ORDER — CALCIUM CARBONATE 500 MG/1
500 TABLET, CHEWABLE ORAL 3 TIMES DAILY PRN
Status: DISCONTINUED | OUTPATIENT
Start: 2022-01-10 | End: 2022-01-13 | Stop reason: HOSPADM

## 2022-01-10 RX ORDER — IBUPROFEN 600 MG/1
600 TABLET, FILM COATED ORAL
Status: DISCONTINUED | OUTPATIENT
Start: 2022-01-10 | End: 2022-01-11

## 2022-01-10 RX ORDER — OXYTOCIN/0.9 % SODIUM CHLORIDE 30/500 ML
1-24 PLASTIC BAG, INJECTION (ML) INTRAVENOUS CONTINUOUS
Status: DISCONTINUED | OUTPATIENT
Start: 2022-01-10 | End: 2022-01-11 | Stop reason: HOSPADM

## 2022-01-10 RX ORDER — OXYTOCIN/0.9 % SODIUM CHLORIDE 30/500 ML
100-340 PLASTIC BAG, INJECTION (ML) INTRAVENOUS CONTINUOUS PRN
Status: DISCONTINUED | OUTPATIENT
Start: 2022-01-10 | End: 2022-01-13 | Stop reason: HOSPADM

## 2022-01-10 RX ORDER — ONDANSETRON 2 MG/ML
4 INJECTION INTRAMUSCULAR; INTRAVENOUS EVERY 6 HOURS PRN
Status: DISCONTINUED | OUTPATIENT
Start: 2022-01-10 | End: 2022-01-11 | Stop reason: HOSPADM

## 2022-01-10 RX ADMIN — CALCIUM CARBONATE (ANTACID) CHEW TAB 500 MG 500 MG: 500 CHEW TAB at 19:57

## 2022-01-10 RX ADMIN — Medication 2 MILLI-UNITS/MIN: at 23:27

## 2022-01-10 RX ADMIN — SODIUM CHLORIDE, POTASSIUM CHLORIDE, SODIUM LACTATE AND CALCIUM CHLORIDE: 600; 310; 30; 20 INJECTION, SOLUTION INTRAVENOUS at 19:13

## 2022-01-10 RX ADMIN — Medication 100 MCG: at 21:45

## 2022-01-10 RX ADMIN — SODIUM CHLORIDE, POTASSIUM CHLORIDE, SODIUM LACTATE AND CALCIUM CHLORIDE: 600; 310; 30; 20 INJECTION, SOLUTION INTRAVENOUS at 20:43

## 2022-01-10 RX ADMIN — Medication 100 MCG: at 21:27

## 2022-01-10 RX ADMIN — LIDOCAINE HYDROCHLORIDE AND EPINEPHRINE 5 ML: 15; 5 INJECTION, SOLUTION EPIDURAL at 20:29

## 2022-01-10 RX ADMIN — SODIUM CHLORIDE, POTASSIUM CHLORIDE, SODIUM LACTATE AND CALCIUM CHLORIDE 250 ML: 600; 310; 30; 20 INJECTION, SOLUTION INTRAVENOUS at 21:31

## 2022-01-10 RX ADMIN — CALCIUM CARBONATE (ANTACID) CHEW TAB 500 MG 500 MG: 500 CHEW TAB at 23:59

## 2022-01-10 RX ADMIN — LIDOCAINE HYDROCHLORIDE 1.5 ML: 10 INJECTION, SOLUTION INFILTRATION; PERINEURAL at 20:26

## 2022-01-10 RX ADMIN — SODIUM CHLORIDE 5 MILLION UNITS: 900 INJECTION INTRAVENOUS at 19:14

## 2022-01-10 RX ADMIN — Medication 10 ML/HR: at 20:36

## 2022-01-10 RX ADMIN — PENICILLIN G 3 MILLION UNITS: 3000000 INJECTION, SOLUTION INTRAVENOUS at 23:24

## 2022-01-10 RX ADMIN — CALCIUM CARBONATE (ANTACID) CHEW TAB 500 MG 500 MG: 500 CHEW TAB at 22:26

## 2022-01-10 RX ADMIN — SODIUM CHLORIDE, POTASSIUM CHLORIDE, SODIUM LACTATE AND CALCIUM CHLORIDE: 600; 310; 30; 20 INJECTION, SOLUTION INTRAVENOUS at 23:26

## 2022-01-11 LAB — T PALLIDUM AB SER QL: NONREACTIVE

## 2022-01-11 PROCEDURE — 88307 TISSUE EXAM BY PATHOLOGIST: CPT

## 2022-01-11 PROCEDURE — 722N000001 HC LABOR CARE VAGINAL DELIVERY SINGLE

## 2022-01-11 PROCEDURE — 250N000013 HC RX MED GY IP 250 OP 250 PS 637: Performed by: FAMILY MEDICINE

## 2022-01-11 PROCEDURE — 120N000001 HC R&B MED SURG/OB

## 2022-01-11 PROCEDURE — 250N000009 HC RX 250: Performed by: FAMILY MEDICINE

## 2022-01-11 PROCEDURE — 250N000011 HC RX IP 250 OP 636: Performed by: FAMILY MEDICINE

## 2022-01-11 PROCEDURE — 250N000011 HC RX IP 250 OP 636: Performed by: NURSE ANESTHETIST, CERTIFIED REGISTERED

## 2022-01-11 PROCEDURE — 59400 OBSTETRICAL CARE: CPT | Performed by: FAMILY MEDICINE

## 2022-01-11 RX ORDER — DOCUSATE SODIUM 100 MG/1
100 CAPSULE, LIQUID FILLED ORAL DAILY
Status: DISCONTINUED | OUTPATIENT
Start: 2022-01-11 | End: 2022-01-13 | Stop reason: HOSPADM

## 2022-01-11 RX ORDER — BISACODYL 10 MG
10 SUPPOSITORY, RECTAL RECTAL DAILY PRN
Status: DISCONTINUED | OUTPATIENT
Start: 2022-01-11 | End: 2022-01-13 | Stop reason: HOSPADM

## 2022-01-11 RX ORDER — FERROUS GLUCONATE 324(38)MG
324 TABLET ORAL 2 TIMES DAILY WITH MEALS
Status: DISCONTINUED | OUTPATIENT
Start: 2022-01-11 | End: 2022-01-13 | Stop reason: HOSPADM

## 2022-01-11 RX ORDER — MISOPROSTOL 100 UG/1
400 TABLET ORAL
Status: DISCONTINUED | OUTPATIENT
Start: 2022-01-11 | End: 2022-01-13 | Stop reason: HOSPADM

## 2022-01-11 RX ORDER — CARBOPROST TROMETHAMINE 250 UG/ML
250 INJECTION, SOLUTION INTRAMUSCULAR
Status: DISCONTINUED | OUTPATIENT
Start: 2022-01-11 | End: 2022-01-13 | Stop reason: HOSPADM

## 2022-01-11 RX ORDER — MODIFIED LANOLIN
OINTMENT (GRAM) TOPICAL
Status: DISCONTINUED | OUTPATIENT
Start: 2022-01-11 | End: 2022-01-13 | Stop reason: HOSPADM

## 2022-01-11 RX ORDER — OXYTOCIN 10 [USP'U]/ML
10 INJECTION, SOLUTION INTRAMUSCULAR; INTRAVENOUS
Status: DISCONTINUED | OUTPATIENT
Start: 2022-01-11 | End: 2022-01-13 | Stop reason: HOSPADM

## 2022-01-11 RX ORDER — ACETAMINOPHEN 325 MG/1
650 TABLET ORAL EVERY 4 HOURS PRN
Status: DISCONTINUED | OUTPATIENT
Start: 2022-01-11 | End: 2022-01-13 | Stop reason: HOSPADM

## 2022-01-11 RX ORDER — TRANEXAMIC ACID 10 MG/ML
1 INJECTION, SOLUTION INTRAVENOUS EVERY 30 MIN PRN
Status: DISCONTINUED | OUTPATIENT
Start: 2022-01-11 | End: 2022-01-13 | Stop reason: HOSPADM

## 2022-01-11 RX ORDER — METHYLERGONOVINE MALEATE 0.2 MG/ML
200 INJECTION INTRAVENOUS
Status: DISCONTINUED | OUTPATIENT
Start: 2022-01-11 | End: 2022-01-13 | Stop reason: HOSPADM

## 2022-01-11 RX ORDER — OXYTOCIN/0.9 % SODIUM CHLORIDE 30/500 ML
340 PLASTIC BAG, INJECTION (ML) INTRAVENOUS CONTINUOUS PRN
Status: COMPLETED | OUTPATIENT
Start: 2022-01-11 | End: 2022-01-11

## 2022-01-11 RX ORDER — HYDROCORTISONE 2.5 %
CREAM (GRAM) TOPICAL 3 TIMES DAILY PRN
Status: DISCONTINUED | OUTPATIENT
Start: 2022-01-11 | End: 2022-01-13 | Stop reason: HOSPADM

## 2022-01-11 RX ORDER — IBUPROFEN 400 MG/1
800 TABLET, FILM COATED ORAL EVERY 6 HOURS PRN
Status: DISCONTINUED | OUTPATIENT
Start: 2022-01-11 | End: 2022-01-13 | Stop reason: HOSPADM

## 2022-01-11 RX ADMIN — ONDANSETRON 4 MG: 2 INJECTION INTRAMUSCULAR; INTRAVENOUS at 00:35

## 2022-01-11 RX ADMIN — Medication: at 01:26

## 2022-01-11 RX ADMIN — IBUPROFEN 800 MG: 400 TABLET, FILM COATED ORAL at 16:11

## 2022-01-11 RX ADMIN — IBUPROFEN 800 MG: 400 TABLET, FILM COATED ORAL at 08:53

## 2022-01-11 RX ADMIN — FERROUS GLUCONATE 324 MG: 324 TABLET ORAL at 08:53

## 2022-01-11 RX ADMIN — Medication 100 MCG: at 00:35

## 2022-01-11 RX ADMIN — Medication 340 ML/HR: at 02:17

## 2022-01-11 RX ADMIN — DOCUSATE SODIUM 100 MG: 100 CAPSULE, LIQUID FILLED ORAL at 19:45

## 2022-01-11 RX ADMIN — ACETAMINOPHEN 650 MG: 325 TABLET, FILM COATED ORAL at 11:57

## 2022-01-11 RX ADMIN — ACETAMINOPHEN 650 MG: 325 TABLET, FILM COATED ORAL at 18:40

## 2022-01-11 RX ADMIN — ACETAMINOPHEN 650 MG: 325 TABLET, FILM COATED ORAL at 23:47

## 2022-01-11 RX ADMIN — DOCUSATE SODIUM 100 MG: 100 CAPSULE, LIQUID FILLED ORAL at 08:52

## 2022-01-11 RX ADMIN — FERROUS GLUCONATE 324 MG: 324 TABLET ORAL at 18:35

## 2022-01-11 RX ADMIN — IBUPROFEN 800 MG: 400 TABLET, FILM COATED ORAL at 22:13

## 2022-01-11 NOTE — PROGRESS NOTES
S:  Patient reports recurrent nausea  Last report from RN: last alistair given about an hour ago  Pitocin at 6    O:  /59   Pulse 78   Temp 97.3  F (36.3  C) (Temporal)   Resp 18   LMP 2021 (Exact Date)   SpO2 97%   Breastfeeding No   Fetal monitoring: moderate risk  Contractions:  Every 4  Baseline: 125 BPM  Accels: present  Decels to 80s but did not match with audible HR  ISE placed, baseline 120s    Cervix 8-9/100%      Assessment:  Jenny Hadley is a 31 year old female  36w3d SROM  GBS positive - second dose of antibiotics in  Category 2 tracing - improved with pitocin off, patient repositioning  Plan: continue to monitor, consider restarting pitocin if needed     GEMMA BAILEY MD ....................  2022   1:25 AM

## 2022-01-11 NOTE — PROGRESS NOTES
S:  Patient reports that she is not completely numb  Last report from RN: alistair given for SBP of 88, IV fluid bolus given     O:  /60   Pulse 106   Temp 97.7  F (36.5  C) (Temporal)   Resp 16   LMP 2021 (Exact Date)   SpO2 100%   Breastfeeding No   Fetal monitoring: low risk  Contractions:  irregular  Baseline: 125 BPM  Accels: present  Variability:  moderate  Decels:   Variables to 100   Cervix       Assessment:  Jenny Hadley is a 31 year old female  36w2d SROM  GBS positive - penicillin given at 1900  Category 2 tracing  Plan: will start pitocin soon,  May need epidural adjustment    GEMMA BAILEY MD ....................  1/10/2022   10:25 PM

## 2022-01-11 NOTE — ANESTHESIA PROCEDURE NOTES
Epidural catheter Procedure Note    Pre-Procedure   Staff -        CRNA: Brenden Acosta APRN CRNA       Performed By: CRNA       Location: OB       Procedure Start/Stop Times: 1/10/2022 8:12 PM and 1/10/2022 8:42 PM       Pre-Anesthestic Checklist: patient identified, IV checked, risks and benefits discussed, informed consent, monitors and equipment checked, pre-op evaluation and at physician/surgeon's request  Timeout:       Correct Patient: Yes        Correct Procedure: Yes        Correct Site: Yes        Correct Position: Yes   Procedure Documentation  Procedure: epidural catheter       Diagnosis: Labor Pain       Patient Position: sitting       Patient Prep/Sterile Barriers: sterile gloves, mask, patient draped       Skin prep: Chloraprep      Local skin infiltrated with mL of 1% lidocaine.        Insertion Site: L2-3. (midline approach).       Technique: LORT air        JULI at 3 cm.       Needle Type: Touhy needle       Needle Gauge: 17.        Needle Length (Inches): 3.5        Catheter: 20 G.         Catheter threaded easily.         Threaded 12 cm at skin.        # of attempts: 1 and  # of redirects:     Assessment/Narrative         Paresthesias: No.     Test dose of mL lidocaine 1.5% w/ 1:200,000 epinephrine at.         Test dose negative, 3 minutes after injection, for signs of intravascular, subdural, or intrathecal injection.       Insertion/Infusion Method: LORT air       Aspiration negative for Heme or CSF via Epidural Catheter.

## 2022-01-11 NOTE — H&P
OB ADMISSION NOTE    CHIEF COMPLAINT:  SROM    OBSTETRICAL / DATING HISTORY:  Estimated Date of Delivery: 2022  Gestational Age:  36w2d    OB History    Para Term  AB Living   4 2 2 0 1 2   SAB IAB Ectopic Multiple Live Births   0 0 0 0 2      # Outcome Date GA Lbr Juanito/2nd Weight Sex Delivery Anes PTL Lv   4 Current            3 Term 19 38w1d 01:31 2.931 kg (6 lb 7.4 oz) F Vag-Spont IV, INT N FARHAN      Name: MENJIVAR,FEMALE-JENNY      Apgar1: 9  Apgar5: 9   2 Molar 18           1 Term 16 39w1d 11:00 3.487 kg (7 lb 11 oz) M Vag-Spont EPI N FARHAN      Name: Hira        TESTING:  Hemoglobin   Date Value Ref Range Status   2022 10.0 (L) 11.7 - 15.7 g/dL Final   2021 13.0 11.7 - 15.7 g/dL Final   ]  Lab Results   Component Value Date    ABO A 2021    RH Pos 2021    [id number] A Rh Positive 2016           PAST MEDICAL HISTORY:  Past Medical History:   Diagnosis Date     History of prior pregnancies     [id number] EDC 22     Molar pregnancy 2018        PAST SURGICAL HISTORY:  Past Surgical History:   Procedure Laterality Date     DENTAL SURGERY      wisdom teeth     DILATION AND CURETTAGE SUCTION N/A 2018    Procedure: DILATION AND CURETTAGE SUCTION;  Suction Dilation & Curettage;  Surgeon: Wily Masr MD;  Location:  OR        ALLERGIES:     Allergies   Allergen Reactions     Cats Itching and Shortness Of Breath          reports that she has never smoked. She has never used smokeless tobacco. She reports previous alcohol use. She reports that she does not use drugs.    PREGNANCY RISK FACTORS:  History of molar pregnancy  GBS positive   Anemia     HISTORY OF PRESENT ILLNESS:    (Please see scanned  sheets for prenatal history. Examination at the time of admission revealed no interval change in the patient s history or physical exam except as described below.)    Jenny Menjivar is a 31 year old female   at 36w2d who presents with SROM at 1645 today.  Intermittent contractions and loss of mucous plug this weekend.    Baby active    REVIEW OF SYSTEMS:  Review Of Systems  Skin: negative  Eyes: negative  Ears/Nose/Throat: negative  Respiratory: No shortness of breath, dyspnea on exertion, cough, or hemoptysis  Cardiovascular: negative  Gastrointestinal: heartburn   Genitourinary: negative  Musculoskeletal: negative  Neurologic: negative  Psychiatric: negative  Hematologic/Lymphatic/Immunologic: negative  Endocrine: negative      PHYSICAL EXAM: /75 (BP Location: Right arm, Patient Position: Sitting)   Pulse 106   Temp 97.9  F (36.6  C) (Temporal)   Resp 18   LMP 2021 (Exact Date)   SpO2 94%   Breastfeeding No   Physical Exam    General: alert, no  distress  CV:  RRR, no murmur  Lungs - CTA  Abdomen - gravid  EXT:  no edema     Cervix 5 cm/ 90%    Membrane Status:  SROM  Fetal Presentation:vertex   Morgan Score:  Not applicable       Fetal monitoring: low risk  Contractions:  rare  Baseline: 145 BPM  Accels: present  Variability:  moderate  Decels:   none    EFW:  3600 yes gms  Pelvic assessment for adequacy: yes     Impression:  1.  Jenny Hadley is a 31 year old female   36w2d with SROM  2.  Category 1 tracing.  3.  GBS positive   4.  Anemia, mild  5.  History of molar pregnancy  Plan:  1.EFM, [place], IV started, bolus given for epidural  2.  Penicillin started  3.  Placenta to pathology     Pia Tong MD

## 2022-01-11 NOTE — PROGRESS NOTES
VSS. Janeth Stafford notified of patient's status. Amnisure positive. She has put orders in and patient is being admitted. PIV placed. Report given to LESTER Hernandez    /75 (BP Location: Right arm, Patient Position: Sitting)   Pulse 106   Temp 97.9  F (36.6  C) (Temporal)   Resp 18   LMP 05/01/2021 (Exact Date)   SpO2 94%   Breastfeeding No     Aj Edmonds RN on 1/10/2022 at 7:08 PM

## 2022-01-11 NOTE — ANESTHESIA POSTPROCEDURE EVALUATION
Patient: Jenny Hadley    Procedure: * No procedures listed *       Diagnosis:* No pre-op diagnosis entered *  Diagnosis Additional Information: No value filed.    Anesthesia Type:  Epidural    Note:  Disposition: Inpatient   Postop Pain Control: Uneventful            Sign Out: Well controlled pain   PONV: No   Neuro/Psych: Uneventful            Sign Out: Acceptable/Baseline neuro status   Airway/Respiratory: Uneventful            Sign Out: Acceptable/Baseline resp. status   CV/Hemodynamics: Uneventful            Sign Out: Acceptable CV status; No obvious hypovolemia; No obvious fluid overload   Other NRE: NONE   DID A NON-ROUTINE EVENT OCCUR? No           Last vitals:  Vitals Value Taken Time   BP     Temp     Pulse     Resp     SpO2         Electronically Signed By: ANDREA WILKINSON CRNA  January 11, 2022  10:38 AM

## 2022-01-11 NOTE — ANESTHESIA PREPROCEDURE EVALUATION
Anesthesia Pre-Procedure Evaluation    Patient: Jenny Hadley   MRN: [id number] : 1990        Preoperative Diagnosis: * No pre-op diagnosis entered *    Procedure : * No procedures listed *          Past Medical History:   Diagnosis Date     History of prior pregnancies     [id number] EDC 22     Molar pregnancy 2018      Past Surgical History:   Procedure Laterality Date     DENTAL SURGERY      wisdom teeth     DILATION AND CURETTAGE SUCTION N/A 2018    Procedure: DILATION AND CURETTAGE SUCTION;  Suction Dilation & Curettage;  Surgeon: Wily Mars MD;  Location: [facility] OR      Allergies   Allergen Reactions     Cats Itching and Shortness Of Breath      Social History     Tobacco Use     Smoking status: Never Smoker     Smokeless tobacco: Never Used   Substance Use Topics     Alcohol use: Not Currently     Alcohol/week: 0.0 standard drinks     Comment: occasional      Wt Readings from Last 1 Encounters:   22 86.3 kg (190 lb 3.2 oz)        Anesthesia Evaluation   Pt has had prior anesthetic. Type: Regional and OB Labor Epidural.    No history of anesthetic complications       ROS/MED HX  ENT/Pulmonary:  - neg pulmonary ROS     Neurologic:  - neg neurologic ROS     Cardiovascular:  - neg cardiovascular ROS     METS/Exercise Tolerance:     Hematologic:  - neg hematologic  ROS     Musculoskeletal:  - neg musculoskeletal ROS     GI/Hepatic:  - neg GI/hepatic ROS     Renal/Genitourinary:  - neg Renal ROS     Endo:  - neg endo ROS     Psychiatric/Substance Use:  - neg psychiatric ROS     Infectious Disease:  - neg infectious disease ROS     Malignancy:  - neg malignancy ROS     Other:      (+) Possibly pregnant, ,         Physical Exam    Airway        Mallampati: I   TM distance: > 3 FB   Neck ROM: full   Mouth opening: > 3 cm    Respiratory Devices and Support         Dental  no notable dental history         Cardiovascular   cardiovascular exam normal       Rhythm and rate: regular  and normal     Pulmonary   pulmonary exam normal        breath sounds clear to auscultation           OUTSIDE LABS:  CBC:   Lab Results   Component Value Date    WBC 8.9 01/10/2022    WBC 8.9 06/18/2021    HGB 10.1 (L) 01/10/2022    HGB 10.0 (L) 01/05/2022    HCT 32.3 (L) 01/10/2022    HCT 38.7 06/18/2021     01/10/2022     06/18/2021     BMP:   Lab Results   Component Value Date     05/13/2018    POTASSIUM 3.9 05/13/2018    CHLORIDE 105 05/13/2018    CO2 24 05/13/2018    BUN 8 05/13/2018    CR 0.53 (L) 11/23/2019    CR 0.51 (L) 05/13/2018    GLC 92 05/13/2018     COAGS: No results found for: PTT, INR, FIBR  POC:   Lab Results   Component Value Date    HCG Positive (A) 04/01/2019     HEPATIC:   Lab Results   Component Value Date    ALBUMIN 3.7 05/13/2018    PROTTOTAL 6.4 05/13/2018    ALT 25 05/13/2018    AST 14 05/13/2018    ALKPHOS 41 05/13/2018    BILITOTAL 1.0 05/13/2018     OTHER:   Lab Results   Component Value Date    GRETA 9.0 05/13/2018    LIPASE 23 05/13/2018       Anesthesia Plan    ASA Status:  2   NPO Status:  NPO Appropriate                  Consents    Anesthesia Plan(s) and associated risks, benefits, and realistic alternatives discussed. Questions answered and patient/representative(s) expressed understanding.    - Discussed:     - Discussed with:  Patient         Postoperative Care            Comments:           neg OB ROS.       ANDREA SHARP CRNA

## 2022-01-11 NOTE — PLAN OF CARE
Assessment completed, vitals stable: /55   Pulse 78   Temp 97.3  F (36.3  C)   Resp 18   LMP 2021 (Exact Date)   SpO2 100%   Breastfeeding Unknown     Denies any pain at this time. Continues to have residual numbness from epidural. Epidural removed. DTV. FF, midline and 3 below umbilicus. Bleeding moderate. No clots noted. Postpartum pitocin and LR infusing. Bonding well with . Tolerated a regular diet and oral rehydration.    David Ambrocio RN 22 4:41 AM

## 2022-01-11 NOTE — PROGRESS NOTES
of viable female  born at 0216 over an intact perineum with apgar's of 8 and 9. To mothers chest. Vigorous and pinking up with stimulation. Oral and nasal bulb suction. Good latch, suck and swallow with first breastfeeding session.    David Ambrocio RN 22 4:14 AM         n/a

## 2022-01-11 NOTE — PROGRESS NOTES
Epidural completed at 2030. Patient reports that she is comfortable and unable to feel contractions. BP's stable. No other symptoms at this time.    David Ambrocio RN 01/10/22 9:07 PM

## 2022-01-11 NOTE — PROGRESS NOTES
MD at bedside due to patients complaint of nausea. Cervix 7-8/90/-1. FHT . Pitocin off. FSE applied. Repositioned to left side. O2 applied per MD recommendation. Rechecked cervix and 9/100/-1. -125.     David Ambrocio RN 01/11/22 1:33 AM

## 2022-01-11 NOTE — L&D DELIVERY NOTE
OB Vaginal Delivery Note    Jenny Hadley MRN# [id number]   Age: 31 year old YOB: 1990       GA: 36w3d  GP:   Labor Complications: None   EBL:   mL  Delivery QBL:    Delivery Type: Vaginal, Spontaneous   ROM to Delivery Time: (Delivered) Hours: 9 Minutes: 26  [place] Weight:     1 Minute 5 Minute 10 Minute   Apgar Totals: 8   9        NO HONG;YOUNG TREVINO;LULA COKER     Delivery Details:  Jenny Hadley, a 31 year old  female delivered a viable infant with apgars of 8  and 9 . Patient presented with SROM, 4cm dilated.  GBS positive and penicillin  Given.  Epidural for anesthesia.  After 4 hrs post antibiotics, pitocin augmentation started.  ISE placed. Patient was fully dilated and pushing after  2 hours    in active labor. Delivery was via vaginal, spontaneous  to a sterile field under epidural  anesthesia. Infant delivered in vertex  middle  occiput  anterior  position. Anterior and posterior shoulders delivered without difficulty. The cord was clamped, cut twice and 3 vessels  were noted. Cord blood was obtained in routine fashion with the following disposition: lab .      Cord complications: none   Placenta delivered at 2022  2:20 AM . Placental disposition was Pathology . Fundal massage performed and fundus found to be firm.     Episiotomy: none    Perineum, vagina, cervix were inspected, and the following lacerations were noted:   Perineal lacerations: none                Any lacerations were repaired in the usual fashion using not applicable .    Excellent hemostasis was noted. Needle count correct. Infant and patient in delivery room in good and stable condition.        Maida Hadley-Jenny [[id number]]    Labor Event Times    Labor onset date: 1/10/22 Onset time:  7:30 PM   Dilation complete date: 22 Complete time:  2:00 AM   Start pushing date/time: 2022 0207      Labor Events     labor?: No   steroids:  None  Labor Type: Spontaneous, Augmentation  Predominate monitoring during 1st stage: continuous electronic fetal monitoring     Antibiotics received during labor?: Yes  Reason for Antibiotics: GBS  Antibiotics received for GBS: Penicillin  Antibiotics Given (GBS): Greater than 4 hours prior to delivery     Rupture identifier: Sac 1  Rupture date/time: 1/10/22 1650   Rupture type: Spontaneous rupture of membranes occuring during spontaneous labor or augmentation  Fluid color: Clear  Fluid odor: Normal     Augmentation: Oxytocin  Augmentation date/time: 1/10/22 2327   Indications for augmentation: Ineffective Contraction Pattern  1:1 continuous labor support provided by?: RN Labor partogram used?: no      Delivery/Placenta Date and Time    Delivery Date: 1/11/22 Delivery Time:  2:16 AM   Placenta Date/Time: 1/11/2022  2:20 AM  Delivering clinician: Pia Rangel MD   Other personnel present at delivery:  Provider Role   David Ambrocio RN Registered Nurse   Nicole Tomas RN Registered Nurse   Rex Toney, RT Night RT         Vaginal Counts     Initial count performed by 2 team members:  Two Team Members   David PHELPS, RN       [place] Suture Needles Sponges (RETIRED) Instruments   Initial counts 1 0 6    Added to count       Relief counts       Final counts             Placed during labor Accounted for at the end of labor   FSE Yes Yes   IUPC No NA   Cervadil No NA              Final count performed by 2 team members:  Two Team Members   David PHELPS, RN      Final count correct?: Yes     Apgars    Living status: Living   1 Minute 5 Minute 10 Minute 15 Minute 20 Minute   Skin color: 0  1       Heart rate: 2  2       Reflex irritability: 2  2       Muscle tone: 2  2       Respiratory effort: 2  2       Total: 8  9       Apgars assigned by: KATRIN BATISTA     Cord    Vessels: 3 Vessels    Cord Complications: None               Cord Blood Disposition: Lab     Gases Sent?: No    Delayed cord clamping?: Yes    Stem cell collection?: No       [place] Resuscitation    Methods: Suctioning   Care at Delivery:  of viable female with apgars 8 &9. Vigorous. Pinking up with stimulation. Bulb to mouth and nose.      Labor Events and Shoulder Dystocia    Fetal Tracing Prior to Delivery: Category 1  Shoulder dystocia present?: Neg     Delivery (Maternal) (Provider to Complete) ([id number])    Episiotomy: None  Perineal lacerations: None    Repair suture: None  Genital tract inspection done: Neg     Blood Loss  Mother: Jenny Hadley #[id number]   Start of Mother's Information    Delivery Blood Loss  01/10/22 1930 - 22 0228    None           End of Mother's Information  Mother: Jenny Hadley #[id number]          Delivery - Provider to Complete ([id number])    Delivering clinician: Pia Rangel MD  Attempted Delivery Types (Choose all that apply): Spontaneous Vaginal Delivery  Delivery Type (Choose the 1 that will go to the Birth History): Vaginal, Spontaneous                   Other personnel:  Provider Role   David Ambrocio, RN Registered Nurse   Nicole Tomas RN Registered Nurse   Rex Toney, RT Night RT                Placenta    Date/Time: 2022  2:20 AM  Removal: Spontaneous  Disposition: Pathology           Anesthesia    Method: Epidural  Cervical dilation at placement: 4-7                Presentation and Position    Presentation: Vertex    Position: Middle Occiput Anterior                 PIA BAILEY MD

## 2022-01-11 NOTE — PROVIDER NOTIFICATION
01/11/22 0033   Vital Signs   BP (!) 76/46       x1 dose of phenylephrine administered. Patient also requesting Zofran, administered. Repositioned and checked cervix 7/9/-1, soft.     David Ambrocio RN 01/11/22 12:46 AM

## 2022-01-11 NOTE — PROGRESS NOTES
FF, midline and 2 below umbilicus. Moderate bleeding with no clots. Denies pain. Nipples tender from breastfeeding. Lanolin cream at bedside. Still having residual numbness. DTV. Ashley care performed. Independent with breastfeeding sessions.     David Ambrocio RN 01/11/22 5:48 AM

## 2022-01-11 NOTE — PROGRESS NOTES
Discussed with pt indication foraugmentation of labor - minimal uterine contractions after SROM. Discussed monitoring (continuos).  Discussed risks including tachysystole and fetal distress.  Discussed increased risk/need for  with labor augmentation.Pt voices good understanding and wishes to proceed.      Pia Tong MD

## 2022-01-11 NOTE — PROGRESS NOTES
S:  Patient reports that she is comfortable      O:  /60   Pulse 106   Temp (P) 97.7  F (36.5  C) (Temporal)   Resp (P) 16   LMP 2021 (Exact Date)   SpO2 100%   Breastfeeding No   Fetal monitoring: low  risk  Contractions:  rare  Baseline: 125 BPM  Accels: present  Variability:  moderate  Decels:   none  Cervix 5-6/90/-1      Assessment:  Jenny Hadley is a 31 year old female  36w2d SROM  GBS positive   Category 1 tracing  Plan: anticipate starting pitocin soon    GEMMA BAILEY MD ....................  1/10/2022   9:15 PM

## 2022-01-12 LAB — HGB BLD-MCNC: 9.8 G/DL (ref 11.7–15.7)

## 2022-01-12 PROCEDURE — 250N000013 HC RX MED GY IP 250 OP 250 PS 637: Performed by: FAMILY MEDICINE

## 2022-01-12 PROCEDURE — 36415 COLL VENOUS BLD VENIPUNCTURE: CPT | Performed by: FAMILY MEDICINE

## 2022-01-12 PROCEDURE — 85018 HEMOGLOBIN: CPT | Performed by: FAMILY MEDICINE

## 2022-01-12 PROCEDURE — 120N000001 HC R&B MED SURG/OB

## 2022-01-12 RX ADMIN — DOCUSATE SODIUM 100 MG: 100 CAPSULE, LIQUID FILLED ORAL at 08:26

## 2022-01-12 RX ADMIN — ACETAMINOPHEN 650 MG: 325 TABLET, FILM COATED ORAL at 08:25

## 2022-01-12 RX ADMIN — IBUPROFEN 800 MG: 400 TABLET, FILM COATED ORAL at 04:13

## 2022-01-12 RX ADMIN — IBUPROFEN 800 MG: 400 TABLET, FILM COATED ORAL at 20:32

## 2022-01-12 RX ADMIN — IBUPROFEN 800 MG: 400 TABLET, FILM COATED ORAL at 12:47

## 2022-01-12 RX ADMIN — CALCIUM CARBONATE (ANTACID) CHEW TAB 500 MG 500 MG: 500 CHEW TAB at 16:45

## 2022-01-12 RX ADMIN — ACETAMINOPHEN 650 MG: 325 TABLET, FILM COATED ORAL at 16:45

## 2022-01-12 RX ADMIN — FERROUS GLUCONATE 324 MG: 324 TABLET ORAL at 08:26

## 2022-01-12 RX ADMIN — FERROUS GLUCONATE 324 MG: 324 TABLET ORAL at 18:34

## 2022-01-12 NOTE — LACTATION NOTE
This note was copied from a baby's chart.  INPATIENT LACTATION CONSULT      Consult with Jenny and carey regarding breastfeeding.Jenny nursed her last child for about a year with no problems. Jenny states she notices obvious rooting with a strong latch during feedings.  Rhythmic and aggressive suckling also noted.  Instructed Jenny on correct positioning and technique when latching babe on.  Jenny is independent with latching babe onto breast.  Minimal assistance required.  Encouraged Jenny on the importance of frequent feedings throughout the day (at least 8-12 feedings in a 24 hour period) and skin to skin contact.  Jenny demonstrated and states she understands all information given.    Kasandra Nieves RN, IBCLC  Lactation Consultant  [facility]

## 2022-01-12 NOTE — PROGRESS NOTES
[facility] Obstetrics Post-Partum Progress Note          Assessment and Plan:    Assessment:   Post-partum day #1  Normal spontaneous vaginal delivery  L&D complications:  GBS positive  Mild anemia  None        Doing well.  Clean wound without signs of infection.  No excessive bleeding  Pain well-controlled.      Plan:   Ambulation encouraged  Breast feeding strategies discussed  History of pyelo after last delivery - no symptoms at this time.            Interval History:   Doing well.  Pain is adequately controlled.  No fevers.  No back pain.  Increased LE edema.  No history of foul-smelling vaginal discharge.  Good appetite.  Denies chest pain, shortness of breath, nausea or vomiting.  Vaginal bleeding is similar to a heavy menstrual flow.  Breastfeeding well - better than last evening.           Significant Problems:    Active Problems:    Encounter for triage in pregnant patient    Normal labor            Review of Systems:    The patient denies any chest pain, shortness of breath, excessive pain, fever, chills, purulent drainage from the wound, nausea or vomiting.          Medications:   All medications related to the patient's surgery have been reviewed          Physical Exam:   All vitals stable /69   Pulse 85   Temp 97  F (36.1  C)   Resp 16   Wt 86.2 kg (190 lb)   LMP 05/01/2021 (Exact Date)   SpO2 98%   Breastfeeding Unknown   BMI 31.14 kg/m     Uterine fundus is firm, non-tender and at the level of the umbilicus  1+ LE edema           Data:   All laboratory data related to this surgery reviewed  Results for orders placed or performed during the hospital encounter of 01/10/22   Rupture of membranes by Amnisure     Status: Abnormal   Result Value Ref Range    Amnisure Positive (A) Negative   Asymptomatic COVID-19 Virus (Coronavirus) by PCR Nose     Status: Normal    Specimen: Nose; Swab   Result Value Ref Range    SARS CoV2 PCR Negative Negative    Narrative    Testing was  performed using the Xpert Xpress SARS-CoV-2 Assay on the   Cepheid Gene-Xpert Instrument Systems. Additional information about   this Emergency Use Authorization (EUA) assay can be found via the Lab   Guide. This test should be ordered for the detection of SARS-CoV-2 in   individuals who meet SARS-CoV-2 clinical and/or epidemiological   criteria. Test performance is unknown in asymptomatic patients. This   test is for in vitro diagnostic use under the FDA EUA for   laboratories certified under CLIA to perform high complexity testing.   This test has not been FDA cleared or approved. A negative result   does not rule out the presence of PCR inhibitors in the specimen or   target RNA in concentration below the limit of detection for the   assay. The possibility of a false negative should be considered if   the patient's recent exposure or clinical presentation suggests   COVID-19. This test was validated by [facility]. This laboratory is certified under the Clinic  al Laboratory Improvement Amendments (CLIA) as qualified to perform high complex  ity clinical laboratory testing.   Treponema Abs w Reflex to RPR and Titer     Status: Normal   Result Value Ref Range    Treponema Antibody Total Nonreactive Nonreactive   CBC with platelets and differential     Status: Abnormal   Result Value Ref Range    WBC Count 8.9 4.0 - 11.0 10e3/uL    RBC Count 3.93 3.80 - 5.20 10e6/uL    Hemoglobin 10.1 (L) 11.7 - 15.7 g/dL    Hematocrit 32.3 (L) 35.0 - 47.0 %    MCV 82 78 - 100 fL    MCH 25.7 (L) 26.5 - 33.0 pg    MCHC 31.3 (L) 31.5 - 36.5 g/dL    RDW 14.4 10.0 - 15.0 %    Platelet Count 225 150 - 450 10e3/uL    % Neutrophils 71 %    % Lymphocytes 20 %    % Monocytes 7 %    % Eosinophils 1 %    % Basophils 0 %    % Immature Granulocytes 1 %    NRBCs per 100 WBC 0 <1 /100    Absolute Neutrophils 6.3 1.6 - 8.3 10e3/uL    Absolute Lymphocytes 1.7 0.8 - 5.3 10e3/uL    Absolute  Monocytes 0.7 0.0 - 1.3 10e3/uL    Absolute Eosinophils 0.1 0.0 - 0.7 10e3/uL    Absolute Basophils 0.0 0.0 - 0.2 10e3/uL    Absolute Immature Granulocytes 0.1 <=0.4 10e3/uL    Absolute NRBCs 0.0 10e3/uL   Hemoglobin     Status: Abnormal   Result Value Ref Range    Hemoglobin 9.8 (L) 11.7 - 15.7 g/dL   Adult Type and Screen     Status: None   Result Value Ref Range    ABO/RH(D) A POS     Antibody Screen Negative Negative    SPECIMEN EXPIRATION DATE [id number]    ABO/Rh type and screen     Status: None    Narrative    The following orders were created for panel order ABO/Rh type and screen.  Procedure                               Abnormality         Status                     ---------                               -----------         ------                     Adult Type and Screen[[id number]]                            Edited Result - FINAL        Please view results for these tests on the individual orders.   CBC with platelets differential     Status: Abnormal    Narrative    The following orders were created for panel order CBC with platelets differential.  Procedure                               Abnormality         Status                     ---------                               -----------         ------                     CBC with platelets and d...[[id number]]  Abnormal            Final result                 Please view results for these tests on the individual orders.       All imaging studies related to this surgery reviewed    GEMMA BAILEY MD

## 2022-01-12 NOTE — PROGRESS NOTES
Breastfeeding independently. Pain controlled with ice packs, ibuprofen and tylenol. Warm moist heat packs used to sore nipples.

## 2022-01-12 NOTE — PLAN OF CARE
Jenny Hadley is doing well after her vaginal delivery. Fundus has remained firm with light flow and minimal clots. Pt soaked in tub. Pt is breast feeding well every 2-3 hours. Pt has had minimal amounts of pain that are well managed with oral analgesics. Pt is ambulating appropriately in room. She is voiding. Pt has verbalized understanding of routine cares as well as warning signs to be aware of.

## 2022-01-13 VITALS
DIASTOLIC BLOOD PRESSURE: 71 MMHG | OXYGEN SATURATION: 99 % | WEIGHT: 190 LBS | BODY MASS INDEX: 31.14 KG/M2 | SYSTOLIC BLOOD PRESSURE: 126 MMHG | HEART RATE: 74 BPM | RESPIRATION RATE: 16 BRPM | TEMPERATURE: 97.5 F

## 2022-01-13 LAB
PATH REPORT.COMMENTS IMP SPEC: NORMAL
PATH REPORT.FINAL DX SPEC: NORMAL
PHOTO IMAGE: NORMAL

## 2022-01-13 PROCEDURE — 250N000013 HC RX MED GY IP 250 OP 250 PS 637: Performed by: FAMILY MEDICINE

## 2022-01-13 RX ORDER — ACETAMINOPHEN 325 MG/1
650 TABLET ORAL EVERY 4 HOURS PRN
COMMUNITY
Start: 2022-01-13 | End: 2023-06-09

## 2022-01-13 RX ORDER — IBUPROFEN 200 MG
800 TABLET ORAL EVERY 6 HOURS PRN
COMMUNITY
Start: 2022-01-13 | End: 2023-06-09

## 2022-01-13 RX ADMIN — IBUPROFEN 800 MG: 400 TABLET, FILM COATED ORAL at 06:45

## 2022-01-13 RX ADMIN — ACETAMINOPHEN 650 MG: 325 TABLET, FILM COATED ORAL at 00:48

## 2022-01-13 NOTE — PLAN OF CARE
Patient assessment WNL, VSS. Independent with caring for infant daughter, breastfeeding Q2-3. Pain well managed with rotating ibuprofen and tylenol. Bleeding is light with no clots, fundus firm. Makes needs known.     /59   Pulse 72   Temp 98  F (36.7  C) (Temporal)   Resp 16   Wt 86.2 kg (190 lb)   LMP 05/01/2021 (Exact Date)   SpO2 98%   Breastfeeding Unknown   BMI 31.14 kg/m      Page Moran RN on 1/13/2022 at 4:21 AM

## 2022-01-13 NOTE — DISCHARGE INSTRUCTIONS
Activity: May return to normal activities. Abstain from sexual intercourse x 6 weeks.   Diet: You may eat a regular diet. Drink plenty of fluids.    Call your Doctor if you experience any of the following symptoms:      Soak an entire sanitary pad with blood in 1 hour or you note clots greater than the size of a golf ball.     Bleeding that lasts greater than 6 weeks.    Foul smelling fluid that comes out of your vagina.    A fever greater than 100.4 degrees.    Severe pain, cramping, or tenderness in your lower belly area.    Increased pain, swelling, redness or fluid around your stitches.    A need to urinate more frequently (use the toilet more often), more urgently (use the toilet very quickly), or it burns when you urinate.    Redness, swelling, or pain around a vein in your leg.    Problems coping with sadness, anxiety, or depression.    Problems breastfeeding, or a red or painful area on your breast.    You have questions or concerns after you return home.    [place]: [phone].

## 2022-01-13 NOTE — PLAN OF CARE
Jenny Hadley is doing well after her vaginal delivery. Fundus has remained firm with light flow and no clots. Pt is breast feeding well. Pt has had minimal amounts of pain that are well managed with oral analgesics. Pt is ambulating appropriately in room. She is voiding. Pt has verbalized understanding of routine cares as well as warning signs to be aware of.

## 2022-01-13 NOTE — PROGRESS NOTES
Assessment completed, vitals stable: /71 (BP Location: Right arm)   Pulse 74   Temp 97.5  F (36.4  C)   Resp 16   Wt 86.2 kg (190 lb)   LMP 05/01/2021 (Exact Date)   SpO2 99%   Breastfeeding Unknown   BMI 31.14 kg/m      Denies any pain or nausea this morning. +1 edema of the lower extremities bilaterally. Patient reports she has been taking frequent walks and elevating her feet. FF, midline and 2 below umbilicus. Reports bleeding is light and denies any clots at this time. Tolerating a regular diet and fluids. Voiding independently. Ambulating independently. Breastfeeding independently and on demand. Denies questions or concerns.  Andrea will be here around 10 am to complete birth certificate with anticipated discharge home this morning. Gave patient breastfeeding pump order per Dr. Matias.     David Ambrocio RN 01/13/22 9:36 AM

## 2022-01-13 NOTE — DISCHARGE SUMMARY
[facility]  Discharge Summary  Obstetrics    Date of Admission:  1/10/2022  Date of Discharge:  2022  Discharging Provider: Ginny Matias    Discharge Diagnoses       History of Present Illness   Jenny Hadley is a 31 year old female who presented with SROM    Hospital Course   The patient's hospital course was unremarkable.  She recovered as anticipated and experienced no post-delivery complications.  On discharge, her pain was well controlled. Vaginal bleeding is similar to peak menstrual flow.  Voiding without difficulty.  Ambulating well and tolerating a normal diet.  No fevers.  Breastfeeding well.  Infant is stable.  She was discharged on post-partum day #2.    Post-partum hemoglobin:   Hemoglobin   Date Value Ref Range Status   2022 9.8 (L) 11.7 - 15.7 g/dL Final   2021 13.0 11.7 - 15.7 g/dL Final       Terril Depression Scale  Thoughts of Harming Self: 0-->never  Total Score: 6    Ginny Matias,   Family Practice    Discharge Disposition   Discharged to home   Condition at discharge: Stable    Primary Care Physician   GEMMA BAILEY    Consultations This Hospital Stay   HOME CARE POST PARTUM/ IP CONSULT  LACTATION IP CONSULT    Discharge Orders      Activity    Activity as tolerated     Reason for your hospital stay    Maternity care     Follow Up    Follow up with provider in 2 weeks and 6 weeks for post-delivery checks     Breast pump - Manual/Electric    Breast Pump Documentation:  Manual/Electric Pump: To support adequate breast milk production and nutrition for infant.     I, the undersigned, certify that the above prescribed supplies are medically necessary for this patient and is both reasonable and necessary in reference to accepted standards of medical and necessary in reference to accepted standards of medical practice in the treatment of this patient's condition and is not prescribed as a convenience.     Diet    Resume  previous diet     Discharge Medications   Current Discharge Medication List      START taking these medications    Details   acetaminophen (TYLENOL) 325 MG tablet Take 2 tablets (650 mg) by mouth every 4 hours as needed for mild pain or fever (greater than or equal to 38  C /100.4  F (oral) or 38.5  C/ 101.4  F (core).)    Associated Diagnoses:  (spontaneous vaginal delivery)      ibuprofen (ADVIL/MOTRIN) 200 MG tablet Take 4 tablets (800 mg) by mouth every 6 hours as needed for other (cramping)    Associated Diagnoses:  (spontaneous vaginal delivery)         CONTINUE these medications which have NOT CHANGED    Details   Docusate Calcium (STOOL SOFTENER PO)       ferrous gluconate (FERGON) 324 (38 Fe) MG tablet Take 1 tablet (324 mg) by mouth daily with food  Qty: 100 tablet, Refills: 1    Associated Diagnoses: Anemia during pregnancy in third trimester      Prenatal Vit-Fe Fumarate-FA (PRENATAL MULTIVITAMIN  PLUS IRON) 27-1 MG TABS Take 1 tablet by mouth daily           Allergies   Allergies   Allergen Reactions     Cats Itching and Shortness Of Breath

## 2022-01-13 NOTE — PROGRESS NOTES
Discharge Note      Data:  Jenny Hadley discharged to home at 1100 via ambulation. Accompanied by spouse and daughter and staff.    Action:  Written discharge/follow-up instructions were provided to patient. Prescriptions - None ordered for discharge. All belongings sent with patient.    Response:  Jenny verbalized understanding of discharge instructions, reason for discharge, and necessary follow-up appointments.    David Ambrocio RN 01/13/22 11:03 AM

## 2022-01-14 ENCOUNTER — LACTATION ENCOUNTER (OUTPATIENT)
Age: 32
End: 2022-01-14

## 2022-01-14 ENCOUNTER — HOSPITAL ENCOUNTER (OUTPATIENT)
Dept: OBGYN | Facility: OTHER | Age: 32
End: 2022-01-14
Attending: OBSTETRICS & GYNECOLOGY
Payer: COMMERCIAL

## 2022-01-14 PROCEDURE — S9443 LACTATION CLASS: HCPCS | Performed by: REGISTERED NURSE

## 2022-01-14 NOTE — LACTATION NOTE
This note was copied from a baby's chart.  Outpatient Lactation Visit    Colleen Hadley  [id number]    Consultation Date: 2022     Reason for Lactation Referral: Initial Lactation Consult    Baby's : [birth year]22    Baby's Current Age: 3 day old  Baby's Gestational Age: Gestational Age: 36w3d    Primary Care Provider: No primary care provider on file.    Presenting Problem (concerns as stated by parent): no concerns    MATERNAL HISTORY   History of Breast Surgery: no  Breast Changes During Pregnancy: no  Breast Feeding History: nursed other 2 children for a year each  Maternal Meds: daily prenatal vitamin  Pregnancy Complications: none  Anesthesia during labor: epidural    MATERNAL ASSESSMENT    Breast Size: average, symmetrical, soft after feeding and filling prior to feeding  Nipple Appearance - Left: slightly cracked, with signs of healing, education on further healing techniques provided  Nipple Appearance - Right: slightly cracked, with signs of healing, education on further healing techniques provided  Nipple Erectility - Left: erect with stimulation  Nipple Erectility - Right: erect with stimulation  Areolas Compressibility: soft  Nipple Size: average  Special Equipment Used: none  Day mother reports milk came in:  Day 3 (last night)    INFANT ASSESSMENT    Oral Anatomy  Mouth: normal  Palate: normal  Jaw: normal  Tongue: normal  Frenulum: normal   Digital Suck Exam: root    FEEDING   Feeding Time: aggressively for 20 minutes  Position:  cradle  Effort to Latch: awake and alert, latched easily  Duration of Breast Feeding: Right Breast: 0; Left Breast: 20  Results: excellent breast feed    Volume of Intake:    Birth Weight: 8 lb 1.4 oz    Hospital discharge weight: 7 lb 7.5 oz    Today's Weight 7 lb 6.6 oz    Total Intake: 0.75 oz  Output: 3-4 soil diapers in last 24 hours, 3-4 wet diapers in last 24 hours    LATCH Score:   Latch: 2 - Good Latch  Audible Swallowin - Spontaneous &  frequent  Type of Nipple: (Breast/Nipple) 2 - Everted  Comfort: 2 - Soft, Nontender  Hold: 2 - No Assist   Total LATCH Score:  10    FEEDING PLAN    Home Feeding Plan: Continue to feed on demand when  elicits feeding cues with deep latch.  Babe should be eating 8-12 times in a 24 hour period.  Exclusivity explained and encouraged in the early weeks to establish breastfeeding and order in milk supply.  Rooming-in encouraged with explanation of the benefits.  Continue to apply expressed breast milk and Lanolin cream to nipples after feedings for healing and comfort.  Postpartum breastfeeding assessment completed and education provided.  Items included in the education are:     proper positioning and latch    effectiveness of feeding    manual expression    handling and storing breastmilk    maintenance of breastfeeding for the first 6 months    sign/symptoms of infant feeding issues requiring referral to qualified health care provider    LACTATION COMMENTS   Deep latch explained for proper positioning of breast in infant's mouth, maximizing milk transfer and comfort.  Reassurance and encouragement provided in regard to mom's concerns about milk supply.  Follow-up support information provided.  Parents plan to keep [place] Well-Child Check with Dr. Tong as scheduled for 2 week well child check.      Face-to-face Time: 60 minutes with assessment and education.    Kasandra Nieves RN  2022  10:39 AM

## 2022-02-21 NOTE — PROGRESS NOTES
Nursing Notes:   Ekaterina Charlton MA  2022  9:56 AM  Signed  Chief Complaint   Patient presents with     Post Partum Exam     Patient is here for post partum exam     Medication Reconciliation: complete    Ekaterina Charlton MA       FOOD SECURITY SCREENING QUESTIONS:    The next two questions are to help us understand your food security.  If you are feeling you need any assistance in this area, we have resources available to support you today.    Hunger Vital Signs:  Within the past 12 months we worried whether our food would run out before we got money to buy more. Never  Within the past 12 months the food we bought just didn't last and we didn't have money to get more. Never     Ekaterina Charlton MA,LPN on 2022 at 9:55 AM           SUBJECTIVE: Jenny is here for a 6-week postpartum checkup.    36w3d - presented in spontaneous labor/SROM.  . She did require Pitocin augmentation.    She has a past history of molar pregnancy and this placenta was sent for pathology review.  It was normal/negative.    Patient did have postpartum anemia, has been on iron.  She last had bleeding over a week ago.      Date of Last Pap:  Is due      Allergies   Allergen Reactions     Cats Itching and Shortness Of Breath     Current Outpatient Medications   Medication     acetaminophen (TYLENOL) 325 MG tablet     Docusate Calcium (STOOL SOFTENER PO)     ibuprofen (ADVIL/MOTRIN) 200 MG tablet     Prenatal Vit-Fe Fumarate-FA (PRENATAL MULTIVITAMIN  PLUS IRON) 27-1 MG TABS     No current facility-administered medications for this visit.      Hemoglobin   Date Value Ref Range Status   2022 12.8 11.7 - 15.7 g/dL Final   2021 13.0 11.7 - 15.7 g/dL Final   ]      Delivery date was 2022. She had a  of a viable girl, weight 8 pounds 1.4 ounces, with no complications.  Since delivery, she has been breast feeding.  She has no signs of infection, bleeding or other complications.    We discussed contraceptions and she  has chosen natural family planning.    She  Has now had intercourse since delivery.  Patient screened for postpartum depression and symptoms are NEGATIVE.   PHQ-2 Score:     PHQ-2 ( 1999 [employer]) 1/5/2022 12/22/2021   Q1: Little interest or pleasure in doing things 0 0   Q2: Feeling down, depressed or hopeless 0 0   PHQ-2 Score 0 0   PHQ-2 Total Score (12-17 Years)- Positive if 3 or more points; Administer PHQ-A if positive 0 0     PHQ-9 SCORE 2/24/2016 1/20/2020 6/9/2021   PHQ-9 Total Score 7 0 1       Screening has also been completed for intimate partner violence.      EXAM: /72 (BP Location: Right arm, Patient Position: Sitting, Cuff Size: Adult Regular)   Pulse 67   Temp 97  F (36.1  C) (Tympanic)   Resp 16   Wt 78.1 kg (172 lb 3.2 oz)   LMP 05/01/2021 (Exact Date)   SpO2 97%   Breastfeeding Yes   BMI 28.22 kg/m     Wt Readings from Last 3 Encounters:   02/22/22 78.1 kg (172 lb 3.2 oz)   01/10/22 86.2 kg (190 lb)   01/05/22 86.3 kg (190 lb 3.2 oz)         GENERAL APPEARANCE: healthy, alert and no distress     EYES: EOMI, PERRL     NECK: no adenopathy, no asymmetry, masses, or scars and thyroid normal to palpation     RESP: lungs clear to auscultation - no rales, rhonchi or wheezes     CV: regular rates and rhythm, normal S1 S2, no S3 or S4 and no murmur, click or rub     ABDOMEN:  soft, nontender, no HSM or masses and bowel sounds normal     MS: extremities normal- no gross deformities noted, no evidence of inflammation in joints, FROM in all extremities.  :  Pap smear obtained      SKIN: no suspicious lesions or rashes     NEURO: Normal strength and tone, sensory exam grossly normal, mentation intact and speech normal     PSYCH: mentation appears normal. and affect normal/bright    Results for orders placed or performed in visit on 02/22/22   Hemoglobin     Status: Normal   Result Value Ref Range    Hemoglobin 12.8 11.7 - 15.7 g/dL         ASSESSMENT:     ICD-10-CM    1. History of molar  pregnancy  Z87.59    2. Anemia during pregnancy in third trimester  O99.013 Hemoglobin         PLAN: 1.  PPBC plan is NFP  2.  Discussed exercise, activity, nutrition; birth control plan is natural family planning.  3.  Discussed changes in sleep routine, sleep deficit monitoring  4.  HCM/Immunizations: up to date   5.  Recheck of hemoglobin today is normal.  Iron can be discontinued.  Continue prenatal vitamins while breast-feeding.  6.  Pap smear updated today    Pia Tong MD

## 2022-02-22 ENCOUNTER — PRENATAL OFFICE VISIT (OUTPATIENT)
Dept: FAMILY MEDICINE | Facility: OTHER | Age: 32
End: 2022-02-22
Attending: FAMILY MEDICINE
Payer: COMMERCIAL

## 2022-02-22 VITALS
BODY MASS INDEX: 28.22 KG/M2 | OXYGEN SATURATION: 97 % | WEIGHT: 172.2 LBS | HEART RATE: 67 BPM | DIASTOLIC BLOOD PRESSURE: 72 MMHG | TEMPERATURE: 97 F | RESPIRATION RATE: 16 BRPM | SYSTOLIC BLOOD PRESSURE: 108 MMHG

## 2022-02-22 DIAGNOSIS — Z12.4 SCREENING FOR MALIGNANT NEOPLASM OF CERVIX: ICD-10-CM

## 2022-02-22 DIAGNOSIS — O99.013 ANEMIA DURING PREGNANCY IN THIRD TRIMESTER: ICD-10-CM

## 2022-02-22 DIAGNOSIS — Z87.59 HISTORY OF MOLAR PREGNANCY: Primary | ICD-10-CM

## 2022-02-22 LAB — HGB BLD-MCNC: 12.8 G/DL (ref 11.7–15.7)

## 2022-02-22 PROCEDURE — G0123 SCREEN CERV/VAG THIN LAYER: HCPCS | Performed by: FAMILY MEDICINE

## 2022-02-22 PROCEDURE — 85018 HEMOGLOBIN: CPT | Mod: ZL | Performed by: FAMILY MEDICINE

## 2022-02-22 PROCEDURE — 87624 HPV HI-RISK TYP POOLED RSLT: CPT | Mod: ZL | Performed by: FAMILY MEDICINE

## 2022-02-22 PROCEDURE — 36415 COLL VENOUS BLD VENIPUNCTURE: CPT | Mod: ZL | Performed by: FAMILY MEDICINE

## 2022-02-22 PROCEDURE — 99207 PR POST-PARTUM 6 WK VISIT - GICH ONLY: CPT | Performed by: FAMILY MEDICINE

## 2022-02-22 ASSESSMENT — EDINBURGH POSTNATAL DEPRESSION SCALE (EPDS)
THINGS HAVE BEEN GETTING ON TOP OF ME: NO, MOST OF THE TIME I HAVE COPED QUITE WELL
I HAVE FELT SAD OR MISERABLE: NOT VERY OFTEN
I HAVE LOOKED FORWARD WITH ENJOYMENT TO THINGS: AS MUCH AS I EVER DID
I HAVE BEEN ANXIOUS OR WORRIED FOR NO GOOD REASON: HARDLY EVER
I HAVE BLAMED MYSELF UNNECESSARILY WHEN THINGS WENT WRONG: NOT VERY OFTEN
I HAVE BEEN ABLE TO LAUGH AND SEE THE FUNNY SIDE OF THINGS: AS MUCH AS I ALWAYS COULD
I HAVE BEEN SO UNHAPPY THAT I HAVE HAD DIFFICULTY SLEEPING: NOT VERY OFTEN
TOTAL SCORE: 7
THE THOUGHT OF HARMING MYSELF HAS OCCURRED TO ME: NEVER
I HAVE FELT SCARED OR PANICKY FOR NO GOOD REASON: NO, NOT MUCH
I HAVE BEEN SO UNHAPPY THAT I HAVE BEEN CRYING: ONLY OCCASIONALLY

## 2022-02-22 ASSESSMENT — PAIN SCALES - GENERAL: PAINLEVEL: NO PAIN (0)

## 2022-02-22 NOTE — NURSING NOTE
Chief Complaint   Patient presents with     Post Partum Exam     Patient is here for post partum exam     Medication Reconciliation: complete    Ekaterina Charlton MA       FOOD SECURITY SCREENING QUESTIONS:    The next two questions are to help us understand your food security.  If you are feeling you need any assistance in this area, we have resources available to support you today.    Hunger Vital Signs:  Within the past 12 months we worried whether our food would run out before we got money to buy more. Never  Within the past 12 months the food we bought just didn't last and we didn't have money to get more. Never     Ekaterina Charlton MA,LPN on 2/22/2022 at 9:55 AM

## 2022-02-25 LAB
BKR LAB AP GYN ADEQUACY: NORMAL
BKR LAB AP GYN INTERPRETATION: NORMAL
BKR LAB AP HPV REFLEX: NORMAL
BKR LAB AP PREVIOUS ABNORMAL: NORMAL
PATH REPORT.COMMENTS IMP SPEC: NORMAL
PATH REPORT.COMMENTS IMP SPEC: NORMAL
PATH REPORT.RELEVANT HX SPEC: NORMAL

## 2022-03-03 LAB
HUMAN PAPILLOMA VIRUS 16 DNA: NEGATIVE
HUMAN PAPILLOMA VIRUS 18 DNA: NEGATIVE
HUMAN PAPILLOMA VIRUS FINAL DIAGNOSIS: NORMAL
HUMAN PAPILLOMA VIRUS OTHER HR: NEGATIVE

## 2022-03-26 ENCOUNTER — HEALTH MAINTENANCE LETTER (OUTPATIENT)
Age: 32
End: 2022-03-26

## 2022-05-25 ENCOUNTER — MEDICAL CORRESPONDENCE (OUTPATIENT)
Dept: HEALTH INFORMATION MANAGEMENT | Facility: OTHER | Age: 32
End: 2022-05-25
Payer: COMMERCIAL

## 2022-07-25 ENCOUNTER — MEDICAL CORRESPONDENCE (OUTPATIENT)
Dept: HEALTH INFORMATION MANAGEMENT | Facility: OTHER | Age: 32
End: 2022-07-25

## 2022-09-17 ENCOUNTER — HEALTH MAINTENANCE LETTER (OUTPATIENT)
Age: 32
End: 2022-09-17

## 2023-05-06 ENCOUNTER — HEALTH MAINTENANCE LETTER (OUTPATIENT)
Age: 33
End: 2023-05-06

## 2023-06-09 ENCOUNTER — TELEPHONE (OUTPATIENT)
Dept: FAMILY MEDICINE | Facility: OTHER | Age: 33
End: 2023-06-09

## 2023-06-09 ENCOUNTER — OFFICE VISIT (OUTPATIENT)
Dept: FAMILY MEDICINE | Facility: OTHER | Age: 33
End: 2023-06-09
Attending: FAMILY MEDICINE
Payer: COMMERCIAL

## 2023-06-09 VITALS
SYSTOLIC BLOOD PRESSURE: 116 MMHG | OXYGEN SATURATION: 99 % | BODY MASS INDEX: 28.82 KG/M2 | WEIGHT: 173 LBS | RESPIRATION RATE: 12 BRPM | HEART RATE: 74 BPM | DIASTOLIC BLOOD PRESSURE: 62 MMHG | HEIGHT: 65 IN | TEMPERATURE: 98.7 F

## 2023-06-09 DIAGNOSIS — O09.90 HIGH-RISK PREGNANCY, UNSPECIFIED TRIMESTER: ICD-10-CM

## 2023-06-09 DIAGNOSIS — N91.2 AMENORRHEA: Primary | ICD-10-CM

## 2023-06-09 DIAGNOSIS — Z87.59 HISTORY OF MOLAR PREGNANCY: ICD-10-CM

## 2023-06-09 LAB — HCG UR QL: POSITIVE

## 2023-06-09 PROCEDURE — 99213 OFFICE O/P EST LOW 20 MIN: CPT | Performed by: FAMILY MEDICINE

## 2023-06-09 PROCEDURE — 81025 URINE PREGNANCY TEST: CPT | Mod: ZL | Performed by: FAMILY MEDICINE

## 2023-06-09 ASSESSMENT — PAIN SCALES - GENERAL: PAINLEVEL: NO PAIN (0)

## 2023-06-09 NOTE — NURSING NOTE
"Chief Complaint   Patient presents with     Amenorrhea     Patient is here for possible pregnancy     Initial /62   Pulse 74   Temp 98.7  F (37.1  C) (Tympanic)   Resp 12   Ht 1.638 m (5' 4.5\")   Wt 78.5 kg (173 lb)   LMP 04/10/2023 (Exact Date)   SpO2 99%   Breastfeeding Yes   BMI 29.24 kg/m   Estimated body mass index is 29.24 kg/m  as calculated from the following:    Height as of this encounter: 1.638 m (5' 4.5\").    Weight as of this encounter: 78.5 kg (173 lb).  Medication Reconciliation: complete    Ekaterina Charlton CMA       FOOD SECURITY SCREENING QUESTIONS:    The next two questions are to help us understand your food security.  If you are feeling you need any assistance in this area, we have resources available to support you today.    Hunger Vital Signs:  Within the past 12 months we worried whether our food would run out before we got money to buy more. Never  Within the past 12 months the food we bought just didn't last and we didn't have money to get more. Never  Ekaterina Charlton CMA,LPN on 6/9/2023 at 2:58 PM      "

## 2023-06-09 NOTE — PROGRESS NOTES
"  Assessment & Plan       ICD-10-CM    1. Amenorrhea  N91.2 Pregnancy, Urine (HCG)     Pregnancy, Urine (HCG)      2. High-risk pregnancy, unspecified trimester  O09.90 Prenatal Vit-Fe Fumarate-FA (PRENATAL MULTIVITAMIN  PLUS IRON) 27-1 MG TABS      OB < 14 Weeks Single      3. History of molar pregnancy  Z87.59         1.  Due to history of molar pregnancy, ultrasound is recommended within the next week.  This would also be for pregnancy dating.  Timing of next visit would be pending ultrasound results.  Prescription for prenatal vitamins sent.      Ordering of each unique test  Prescription drug management         BMI:   Estimated body mass index is 29.24 kg/m  as calculated from the following:    Height as of this encounter: 1.638 m (5' 4.5\").    Weight as of this encounter: 78.5 kg (173 lb).           GEMMA BAILEY MD  [facility] AND [facility]    Clinton Alvarado is a 32 year old, presenting for the following health issues:  Amenorrhea        2023     2:55 PM   Additional Questions   Roomed by USHA Tobar   Accompanied by Self     History of Present Illness       Reason for visit:  I think I m pregnant and I m not sure how far along I might be.    She eats 2-3 servings of fruits and vegetables daily.She consumes 0 sweetened beverage(s) daily.She exercises with enough effort to increase her heart rate 10 to 19 minutes per day.  She exercises with enough effort to increase her heart rate 3 or less days per week.   She is taking medications regularly.       She had 3 positive UPTs at home this week.    Only period since last pregnancy was 4/10/23 - overall this seemed like a pretty normal period.    She if  with history of molar pregnancy and last delivery at 36+ weeks GA.  Is not currently on prenatal vitamins.     Current Outpatient Medications   Medication     Prenatal Vit-Fe Fumarate-FA (PRENATAL MULTIVITAMIN  PLUS IRON) 27-1 MG TABS     No current facility-administered " "medications for this visit.     Past Medical History:   Diagnosis Date     History of prior pregnancies 2019    [id number] EDC 2/5/22     Molar pregnancy 05/23/2018             Review of Systems   Constitutional: Positive for fatigue.   Gastrointestinal: Positive for nausea.            Objective    /62   Pulse 74   Temp 98.7  F (37.1  C) (Tympanic)   Resp 12   Ht 1.638 m (5' 4.5\")   Wt 78.5 kg (173 lb)   LMP 04/10/2023 (Exact Date)   SpO2 99%   Breastfeeding Yes   BMI 29.24 kg/m    Body mass index is 29.24 kg/m .  Physical Exam  Vitals and nursing note reviewed.   Constitutional:       Appearance: Normal appearance.   Neurological:      Mental Status: She is alert.            Results for orders placed or performed in visit on 06/09/23   Pregnancy, Urine (HCG)     Status: Abnormal   Result Value Ref Range    hCG Urine Qualitative Positive (A) Negative                   "

## 2023-06-09 NOTE — TELEPHONE ENCOUNTER
6/20/23 first available appointment for OB ultrasound. Ok to wait until then?    Ekaterina Charlton, CMA on 6/9/2023 at 3:21 PM

## 2023-06-11 ASSESSMENT — ENCOUNTER SYMPTOMS
NAUSEA: 1
FATIGUE: 1

## 2023-06-13 ENCOUNTER — TELEPHONE (OUTPATIENT)
Dept: FAMILY MEDICINE | Facility: OTHER | Age: 33
End: 2023-06-13
Payer: COMMERCIAL

## 2023-06-13 ENCOUNTER — HOSPITAL ENCOUNTER (OUTPATIENT)
Dept: ULTRASOUND IMAGING | Facility: OTHER | Age: 33
Discharge: HOME | End: 2023-06-13
Attending: FAMILY MEDICINE | Admitting: FAMILY MEDICINE
Payer: COMMERCIAL

## 2023-06-13 DIAGNOSIS — O09.90 HIGH-RISK PREGNANCY, UNSPECIFIED TRIMESTER: ICD-10-CM

## 2023-06-13 PROCEDURE — 76801 OB US < 14 WKS SINGLE FETUS: CPT

## 2023-06-13 NOTE — TELEPHONE ENCOUNTER
Please schedule patient for OB nurse intake and OB physical - the ob physical can be in 3-4 weeks.       Pia Tong MD

## 2023-06-16 NOTE — TELEPHONE ENCOUNTER
Scheduled intake 6/19 and ob px [phone] on 7/3, blocked afternoon same day for catch up if needed.    Jazmyne Matthews on 6/16/2023 at 9:26 AM

## 2023-06-19 ENCOUNTER — VIRTUAL VISIT (OUTPATIENT)
Dept: OBGYN | Facility: OTHER | Age: 33
End: 2023-06-19
Attending: FAMILY MEDICINE
Payer: COMMERCIAL

## 2023-06-19 VITALS — WEIGHT: 172 LBS | HEIGHT: 65 IN | BODY MASS INDEX: 28.66 KG/M2

## 2023-06-19 DIAGNOSIS — O36.80X0 ENCOUNTER TO DETERMINE FETAL VIABILITY OF PREGNANCY, SINGLE OR UNSPECIFIED FETUS: Primary | ICD-10-CM

## 2023-06-19 PROCEDURE — 99207 PR OB VISIT-NO CHARGE - GICH ONLY: CPT | Mod: 95

## 2023-06-19 ASSESSMENT — PATIENT HEALTH QUESTIONNAIRE - PHQ9: SUM OF ALL RESPONSES TO PHQ QUESTIONS 1-9: 6

## 2023-06-19 ASSESSMENT — PAIN SCALES - GENERAL: PAINLEVEL: NO PAIN (0)

## 2023-06-19 NOTE — PROGRESS NOTES
Verbal consent obtained for telephone visit. Total length of call: 20 min    HPI:    This is a 32 year old female patient,  who was called today for OB Intake visit. Patient reports positive pregnancy test at home.     Obstetrical history and OB Demographics updated to the best of this nurse's ability based on patient report. PHQ-9 depression screening and routine Domestic Abuse screening completed. All immediate questions and concerns answered.    FOOD SECURITY SCREENING QUESTIONS:    The next two questions are to help us understand your food security.  If you are feeling you need any assistance in this area, we have resources available to support you today.    Hunger Vital Signs:  Within the past 12 months we worried whether our food would run out before we got money to buy more. Never  Within the past 12 months the food we bought just didn't last and we didn't have money to get more. Never    Last menstrual period is reported as Patient's last menstrual period was 04/10/2023. RAVI based on LMP is Estimated Date of Delivery: Chilo 15, 2024.  Her cycles are irregular.  Her last menstrual period was normal.   Since her LMP, she has experienced  nausea, fatigue, headache, loss of appetite, lightheadedness, urinary frequency and constipation.       OBSTETRIC HISTORY:    OB History    Para Term  AB Living   6 3 2 1 1 3   SAB IAB Ectopic Multiple Live Births   0 0 0 0 3      # Outcome Date GA Lbr Juanito/2nd Weight Sex Delivery Anes PTL Lv   6 Current            5  22 36w3d 06:30 / 00:16 3.668 kg (8 lb 1.4 oz) F Vag-Spont EPI N FARHAN      Name: Colleen      Apgar1: 8  Apgar5: 9   4 Term 19 38w1d 01:31 2.931 kg (6 lb 7.4 oz) F Vag-Spont IV, INT N FARHAN      Name: Lilevermerle      Apgar1: 9  Apgar5: 9   3 Molar 18           2 Term 16 39w1d 11:00 3.487 kg (7 lb 11 oz) M Vag-Spont EPI N FARHAN      Name: Hira   1                Obstetric Comments   Placenta to pathology  each pregnancy due to molar history    Lab Results                         A Rh Positive       04/06/2016             GBS positive 1/11/2022    QUAD yes 2021       Age of first pregnancy: 25  Previous OB Provider: Dr. Janeth Tong  Previous Delivering Clinic: [facility]  Release of Records: None needed     Current delivery plan: [facility]  Preferred OB Provider: Dr. Janeth Tong  Current Primary Care Provider: Dr. Janeth Tong  Pediatrician: Dr. Janeth Tong    Additional History: History of a molar pregnancy.    Have you travelled during the pregnancy?No  Have your sexual partner(s) travelled during the pregnancy?No      HISTORY:   Planned Pregnancy: No  Marital Status:   Occupation: Stay at Home Mom  Living in Household: Spouse and Children    Father of the baby is involved.   Family and father of baby are supportive of current pregnancy.  Past Medical History of Father of Baby:No significant medical history    Past History:  Her past medical history   Past Medical History:   Diagnosis Date     History of prior pregnancies 2019    [id number] EDC 2/5/22     Molar pregnancy 05/23/2018   .      Her past surgical history:   Past Surgical History:   Procedure Laterality Date     DENTAL SURGERY      wisdom teeth     DILATION AND CURETTAGE SUCTION N/A 5/16/2018    Procedure: DILATION AND CURETTAGE SUCTION;  Suction Dilation & Curettage;  Surgeon: Wily Mars MD;  Location:  OR       She has a history of  pre-term delivery at 36 weeks    Since her last LMP she denies use of alcohol, tobacco and street drugs.    Pap smear history: 2/22/2022.    STD/STI history: No STD history    STD/STI symptoms: no noticeable symptoms     Past medical, surgical, social and family history were reviewed and updated in EPIC.    Medications reviewed by this nurse. Current medication list:  Current Outpatient Medications   Medication Sig Dispense Refill     Prenatal Vit-Fe Fumarate-FA (PRENATAL  MULTIVITAMIN  PLUS IRON) 27-1 MG TABS Take 1 tablet by mouth daily 90 tablet 3     The following medications were recommended to be discontinued due to Pregnancy Category D status: Ibuprofen.  Patient informed to contact her primary care provider as soon as possible to discuss a safer alternative.    Risk factors:  Moderate and moderately severe risks (consult with OB/Gyn)  Previous fetal or  demise: No  History of  delivery: Yes  History of heart disease Class I: No  Severe anemia, unresponsive to iron therapy: No  Pelvic mass or neoplasm: No  Previous : No  Hyper/hypothyroidism: No  History of postpartum hemorrhage requiring transfusion:No  History of Placenta Accreta: No    High Risk (Pregnancy managed by OB/Gyn)  Multiple pregnancy: No  Pre-gestational diabetes: No  Chronic Hypertension: No  Renal Failure: No  Heart disease, class II or greater: No  Rh Isoimmunization: No  Chronic active hepatitis: No  Convulsive disorder, poorly controlled: No  Isoimmune thrombocytopenia: No  Pre-term premature rupture of membranes: No  Lupus or other autoimmune disorder: No  Human Immunodeficiency Virus: No      ASSESSMENT/PLAN:       ICD-10-CM    1. Encounter to determine fetal viability of pregnancy, single or unspecified fetus  O36.80X0           32 year old , Unknown of pregnancy with RAVI of 1/15/2024..    Per standing orders and scope of practice of this nurse, patient will have the following orders placed and completed prior to initial OB visit with the appropriate provider:    --early ultrasound for dating and viability ordered for 6+ weeks gestation based on LMP    --Quantitative Beta HCG and progesterone monitoring if indicated    Counseling given:     - Recommended weight gain for pregnancy: 15-25 lbs.   BMI < 18.5  28-40 lbs   18.5 - 24.9 25-35   25 - 29.9 15-25   > 30  < 15       PLAN/PATIENT INSTRUCTIONS:    Normal exercise.  Normal sexual activity.  Prenatal vitamins.    follow-up  appointment with Dr. Janeth Tong for pre-tory care and take multivitamin or pre-tory vitamins    Ruma Moran RN.................................................. 2023 9:08 AM

## 2023-07-02 LAB
ABO/RH(D): NORMAL
ANTIBODY SCREEN: NEGATIVE
SPECIMEN EXPIRATION DATE: NORMAL

## 2023-07-03 ENCOUNTER — PRENATAL OFFICE VISIT (OUTPATIENT)
Dept: FAMILY MEDICINE | Facility: OTHER | Age: 33
End: 2023-07-03
Attending: FAMILY MEDICINE
Payer: COMMERCIAL

## 2023-07-03 VITALS
TEMPERATURE: 98.4 F | HEIGHT: 65 IN | SYSTOLIC BLOOD PRESSURE: 102 MMHG | OXYGEN SATURATION: 97 % | HEART RATE: 82 BPM | DIASTOLIC BLOOD PRESSURE: 54 MMHG | RESPIRATION RATE: 14 BRPM | BODY MASS INDEX: 28.12 KG/M2 | WEIGHT: 168.8 LBS

## 2023-07-03 DIAGNOSIS — O09.90 HIGH-RISK PREGNANCY, UNSPECIFIED TRIMESTER: Primary | ICD-10-CM

## 2023-07-03 DIAGNOSIS — Z87.59 HISTORY OF MOLAR PREGNANCY: ICD-10-CM

## 2023-07-03 LAB
ALBUMIN UR-MCNC: 20 MG/DL
APPEARANCE UR: CLEAR
BACTERIA #/AREA URNS HPF: ABNORMAL /HPF
BILIRUB UR QL STRIP: NEGATIVE
C TRACH DNA SPEC QL PROBE+SIG AMP: NEGATIVE
CLUE CELLS: ABNORMAL
COLOR UR AUTO: YELLOW
ERYTHROCYTE [DISTWIDTH] IN BLOOD BY AUTOMATED COUNT: 11.8 % (ref 10–15)
GLUCOSE UR STRIP-MCNC: NEGATIVE MG/DL
HCT VFR BLD AUTO: 35.6 % (ref 35–47)
HGB BLD-MCNC: 12.2 G/DL (ref 11.7–15.7)
HGB UR QL STRIP: ABNORMAL
KETONES UR STRIP-MCNC: NEGATIVE MG/DL
LEUKOCYTE ESTERASE UR QL STRIP: NEGATIVE
MCH RBC QN AUTO: 29 PG (ref 26.5–33)
MCHC RBC AUTO-ENTMCNC: 34.3 G/DL (ref 31.5–36.5)
MCV RBC AUTO: 85 FL (ref 78–100)
MUCOUS THREADS #/AREA URNS LPF: PRESENT /LPF
N GONORRHOEA DNA SPEC QL NAA+PROBE: NEGATIVE
NITRATE UR QL: NEGATIVE
PH UR STRIP: 6.5 [PH] (ref 5–9)
PLATELET # BLD AUTO: 286 10E3/UL (ref 150–450)
RBC # BLD AUTO: 4.21 10E6/UL (ref 3.8–5.2)
RBC URINE: 5 /HPF
SP GR UR STRIP: 1.03 (ref 1–1.03)
TRICHOMONAS, WET PREP: ABNORMAL
UROBILINOGEN UR STRIP-MCNC: NORMAL MG/DL
WBC # BLD AUTO: 8.1 10E3/UL (ref 4–11)
WBC URINE: <1 /HPF
WBC'S/HIGH POWER FIELD, WET PREP: ABNORMAL
YEAST, WET PREP: ABNORMAL

## 2023-07-03 PROCEDURE — 87086 URINE CULTURE/COLONY COUNT: CPT | Mod: ZL | Performed by: FAMILY MEDICINE

## 2023-07-03 PROCEDURE — 86762 RUBELLA ANTIBODY: CPT | Mod: ZL | Performed by: FAMILY MEDICINE

## 2023-07-03 PROCEDURE — 87389 HIV-1 AG W/HIV-1&-2 AB AG IA: CPT | Mod: ZL | Performed by: FAMILY MEDICINE

## 2023-07-03 PROCEDURE — 86901 BLOOD TYPING SEROLOGIC RH(D): CPT | Mod: ZL | Performed by: FAMILY MEDICINE

## 2023-07-03 PROCEDURE — 86803 HEPATITIS C AB TEST: CPT | Mod: ZL | Performed by: FAMILY MEDICINE

## 2023-07-03 PROCEDURE — 86850 RBC ANTIBODY SCREEN: CPT | Mod: ZL | Performed by: FAMILY MEDICINE

## 2023-07-03 PROCEDURE — 81001 URINALYSIS AUTO W/SCOPE: CPT | Mod: ZL | Performed by: FAMILY MEDICINE

## 2023-07-03 PROCEDURE — 87591 N.GONORRHOEAE DNA AMP PROB: CPT | Mod: ZL | Performed by: FAMILY MEDICINE

## 2023-07-03 PROCEDURE — 87491 CHLMYD TRACH DNA AMP PROBE: CPT | Mod: ZL | Performed by: FAMILY MEDICINE

## 2023-07-03 PROCEDURE — 85027 COMPLETE CBC AUTOMATED: CPT | Mod: ZL | Performed by: FAMILY MEDICINE

## 2023-07-03 PROCEDURE — 87210 SMEAR WET MOUNT SALINE/INK: CPT | Mod: ZL | Performed by: FAMILY MEDICINE

## 2023-07-03 PROCEDURE — 86780 TREPONEMA PALLIDUM: CPT | Mod: ZL | Performed by: FAMILY MEDICINE

## 2023-07-03 PROCEDURE — 99207 PR OB VISIT-NO CHARGE - GICH ONLY: CPT | Performed by: FAMILY MEDICINE

## 2023-07-03 PROCEDURE — 87340 HEPATITIS B SURFACE AG IA: CPT | Mod: ZL | Performed by: FAMILY MEDICINE

## 2023-07-03 PROCEDURE — 36415 COLL VENOUS BLD VENIPUNCTURE: CPT | Mod: ZL | Performed by: FAMILY MEDICINE

## 2023-07-03 ASSESSMENT — PAIN SCALES - GENERAL: PAINLEVEL: NO PAIN (0)

## 2023-07-03 NOTE — NURSING NOTE
"Chief Complaint   Patient presents with     OB Education     OB physical      Patient is here for OB physical     Initial /54   Pulse 82   Temp 98.4  F (36.9  C) (Tympanic)   Resp 14   Ht 1.645 m (5' 4.75\")   Wt 76.6 kg (168 lb 12.8 oz)   LMP 04/10/2023   SpO2 97%   Breastfeeding Yes   BMI 28.31 kg/m   Estimated body mass index is 28.31 kg/m  as calculated from the following:    Height as of this encounter: 1.645 m (5' 4.75\").    Weight as of this encounter: 76.6 kg (168 lb 12.8 oz).  Medication Reconciliation: complete    Ekaterina Charlton CMA       FOOD SECURITY SCREENING QUESTIONS:    The next two questions are to help us understand your food security.  If you are feeling you need any assistance in this area, we have resources available to support you today.    Hunger Vital Signs:  Within the past 12 months we worried whether our food would run out before we got money to buy more. Never  Within the past 12 months the food we bought just didn't last and we didn't have money to get more. Never  Ekaterina Charlton CMA,LPN on 7/3/2023 at 9:51 AM      "

## 2023-07-03 NOTE — PROGRESS NOTES
First Obstetric Visit       HPI       Jenny Hadley is a 32 year old   who presents for an initial prenatal visit at 10w5d  of pregnancy with RAVI of 2024 by ultrasound done at 8 weeks     Father of baby:  Andrea   Work status at home   Significant issues with prior pregnancy(ies):  History of molar pregnancy      OB History    Para Term  AB Living   6 3 2 1 1 3   SAB IAB Ectopic Multiple Live Births   0 0 0 0 3      # Outcome Date GA Lbr Juanito/2nd Weight Sex Delivery Anes PTL Lv   6 Current            5  22 36w3d 06:30 / 00:16 3.668 kg (8 lb 1.4 oz) F Vag-Spont EPI N FARHAN      Name: Colleen      Apgar1: 8  Apgar5: 9   4 Term 19 38w1d 01:31 2.931 kg (6 lb 7.4 oz) F Vag-Spont IV, INT N FARHAN      Name: Arun      Apgar1: 9  Apgar5: 9   3 Molar 18           2 Term 16 39w1d 11:00 3.487 kg (7 lb 11 oz) M Vag-Spont EPI N FARHAN      Name: Hira   1                Obstetric Comments   Placenta to pathology each pregnancy due to molar history    Lab Results                         A Rh Positive       2016             GBS positive 2022    QUAD yes          Lab Results   Component Value Date    ABO A 2021    RH Pos 2021    [id number] A Rh Positive 2016       Lab Results   Component Value Date    PAP NIL 2019         She has not had bleeding since her LMP.   She has had mild nausea.        Labor Risk Assessment   Is the patient's age <18 or >40?     No  Patint's BMI is Body mass index is 28.31 kg/m .   Does patient have a BMI < 18.5?     No  Prior delivery within 6 months?      No  Ever delivered prior to 37 weeks gestation?  No  Pregnancy occur via In Vitro Fertilization?   No  Are you carrying twins?       No    The patient has the following additional risk factors for  labor:   History of delivery at 36+ weeks    Past Medical History  Past Medical History:   Diagnosis Date     History of prior pregnancies  2019    [id number] EDC 2/5/22     Molar pregnancy 05/23/2018     Do you have a history of any of the following medical conditions?    Condition Yes/No   Hypertension No   Heart disease, mitral valve prolapse, rheumatic fever No   Asthma or another chronic lung disease No   An autoimmune disorder No   Kidney disease No   Frequent urinary tract infections No   Migraine headaches No   Stroke, loss of sensation/function, seizures, or other neuro problem No   Diabetes No   Thyroid problems or have you taken thyroid medication No   Hepatitis, liver disease, jaundice No   Blood clots, phlebitis, pulmonary embolism or varicose veins No   Excessive bleeding after surgery or dental work No   Heavy menstrual periods requiring treatment No   Anemia Yes - with pregnancy   Blood transfusions No        Would you refuse a blood transfusion? No   Breast problems No   Abnormalities of the uterus No   Abnormal pap smear No   Have you been treated for an emotional disturbance? No   Have you been physically, sexually, or emotionally hurt by someone? No   Have you been in a major accident or suffered serious trauma? No   Have you had surgical procedures? Yes         Have you ever had any complications from anesthesia? No   Have you ever been hospitalized for a nonsurgical reason? No     Notes for positives   Past Surgical History:   Procedure Laterality Date     DENTAL SURGERY      wisdom teeth     DILATION AND CURETTAGE SUCTION N/A 5/16/2018    Procedure: DILATION AND CURETTAGE SUCTION;  Suction Dilation & Curettage;  Surgeon: Wily Mras MD;  Location:  OR         Prenatal Genetic Screening    Do you have a history of any of the following Yes/No        A metabolic disorder (e.g. Insulin-dependent DM, PKU) No        Recurrent pregnancy loss No     Do you, the baby's father, or anyone in your families have Yes/No        Thalassemia AND MCV <80 No        Hemophilia No        Neural tube defect No        Congenital heart defect No         Sickle cell disease or trait No        Muscular dystrophy No        Cystic fibrosis No        Mental retardation or autism No        Down's syndrome No        [place]'s disease No        [place]'s chorea No        Any other inherited genetic or chromosomal disorder No        A child with birth defects not listed above No     Infection History    At high risk for coming in contact with HIV No   Ever treated for tuberculosis No   Ever received the BCG vaccine for tuberculosis No   Ever had genital herpes (or has your partner) No   Had a rash or viral illness since LMP No   Ever had a sexually transmitted infection No   Ever had chicken pox or the vaccine Yes   Ever had any other serious infectious disease No   Up to date with immunizations Yes - COVID x2       Habits  Have you ever used tobacco products (cigarettes, chewing tobacco)? No, Do you currently use tobacco products? No. , Have you ever used alcohol? yes, Have you ever used any other drugs? CBD chew     Allergies   Allergen Reactions     Cats Itching and Shortness Of Breath       Current medications are:  Current Outpatient Medications   Medication Sig Dispense Refill     Prenatal Vit-Fe Fumarate-FA (PRENATAL MULTIVITAMIN  PLUS IRON) 27-1 MG TABS Take 1 tablet by mouth daily 90 tablet 3       REVIEW OF SYSTEMS  ENT: NEGATIVE for ear, mouth and throat problems  CV: NEGATIVE for chest pain, palpitations or peripheral edema  GI: NEGATIVE for nausea, abdominal pain, heartburn, or change in bowel habits  : NEGATIVE for unusual urinary or vaginal symptoms. Periods are regular.  C: NEGATIVE for fever, chills, change in weight  I: NEGATIVE for worrisome rashes, moles or lesions  E: NEGATIVE for vision changes or irritation  R: NEGATIVE for significant cough or SOB  B: NEGATIVE for masses, tenderness or discharge  M: NEGATIVE for significant arthralgias or myalgia  N: NEGATIVE for weakness, dizziness or paresthesias  P: NEGATIVE for changes in mood or  "affect  ====================================================    PHYSICAL EXAM:  /54   Pulse 82   Temp 98.4  F (36.9  C) (Tympanic)   Resp 14   Ht 1.645 m (5' 4.75\")   Wt 76.6 kg (168 lb 12.8 oz)   LMP 04/10/2023   SpO2 97%   Breastfeeding Yes   BMI 28.31 kg/m    Wt Readings from Last 4 Encounters:   23 76.6 kg (168 lb 12.8 oz)   23 78 kg (172 lb)   23 78.5 kg (173 lb)   22 78.1 kg (172 lb 3.2 oz)            GENERAL:  Pleasant pregnant female, alert, well groomed.  SKIN:  Warm and dry, without lesions or rashes  HEAD: Symmetrical features.  EYES:  PERRL, EOMI.  MOUTH:  Buccal mucosa pink, moist without lesions.    NECK:  Thyroid without enlargement and nodules.  Lymph nodes not palpable.  LUNGS:  Clear to auscultation.  HEART:  RRR without murmur.  ABDOMEN: Soft without masses, tenderness or organomegaly.  No CVA tenderness. No scars noted.. FHT 150s  MUSCULOSKELETAL:  Full range of motion  EXTREMITIES:  No edema. No significant varicosities.   GENITALIA:  no lesions noted   VAGINA:  Pink, normal rugae and discharge   CERVIX:  smooth, without discharge or CMT and parous os,   firm/ closed 4 cm long.  UTERUS: Anteverted, nontender 10 weeks in size.  ADNEXA: Without masses or tenderness  =========================================    ASSESSMENT & PLAN     ICD-10-CM    1. High-risk pregnancy, unspecified trimester  O09.90 URINE MACROSCOPIC WITH REFLEX TO MICRO     Urine Culture Aerobic Bacterial     Treponema Abs w Reflex to RPR and Titer     HIV Antigen Antibody Combo     HEPATITIS B SURFACE ANTIGEN     CBC WITH PLATELETS     Rubella Antibody IgG Quantitative     ABO/Rh type and screen     Hepatitis C Screen Reflex to HCV RNA Quant and Genotype     GC/Chlamydia by PCR      2. History of molar pregnancy  Z87.59            32 year old  Unknown weeks of pregnancy with RAVI of 2024 by LMP of Patient's last menstrual period was 04/10/2023..      Pregnancy Risk Assessment: " Average risk pregnancy  Dating US ordered?   Yes - completed  PNV/Folate ordered?  Yes  Follow up:   1 month  Recommended weight gain:  25-35 lbs.    Genetic screening for CF and SMA:  declined   NIPT:  At 16 weeks       Reviewed indication to send placenta to pathology due to molar pregnancy history     Instructed on best evidence for: weight gain for her BMI for pregnancy; healthy diet and foods to avoid; exercise and activity during pregnancy; avoiding exposure to toxoplasmosis; and maintenance of a generally healthy lifestyle. Educational folder has been given.    Discussed the harms, benefits, side effects and alternative therapies for current prescribed and OTC medications.    Immunizations  Boostrix at 28 weeks  Flu vaccine in season    COVID Vaccine x2    Pia Tong MD

## 2023-07-04 LAB
HBV SURFACE AG SERPL QL IA: NONREACTIVE
HCV AB SERPL QL IA: NONREACTIVE
HIV 1+2 AB+HIV1 P24 AG SERPL QL IA: NONREACTIVE

## 2023-07-05 LAB
BACTERIA UR CULT: NORMAL
RUBV IGG SERPL QL IA: 4.23 INDEX
RUBV IGG SERPL QL IA: POSITIVE
T PALLIDUM AB SER QL: NONREACTIVE

## 2023-07-31 ENCOUNTER — PRENATAL OFFICE VISIT (OUTPATIENT)
Dept: FAMILY MEDICINE | Facility: OTHER | Age: 33
End: 2023-07-31
Attending: FAMILY MEDICINE
Payer: COMMERCIAL

## 2023-07-31 VITALS
OXYGEN SATURATION: 99 % | TEMPERATURE: 98 F | BODY MASS INDEX: 29.04 KG/M2 | SYSTOLIC BLOOD PRESSURE: 118 MMHG | HEART RATE: 75 BPM | WEIGHT: 173.2 LBS | RESPIRATION RATE: 16 BRPM | DIASTOLIC BLOOD PRESSURE: 68 MMHG

## 2023-07-31 DIAGNOSIS — Z87.59 HISTORY OF MOLAR PREGNANCY: ICD-10-CM

## 2023-07-31 DIAGNOSIS — O09.90 HIGH-RISK PREGNANCY, UNSPECIFIED TRIMESTER: Primary | ICD-10-CM

## 2023-07-31 PROCEDURE — 99207 PR OB VISIT-NO CHARGE - GICH ONLY: CPT | Performed by: FAMILY MEDICINE

## 2023-07-31 ASSESSMENT — PAIN SCALES - GENERAL: PAINLEVEL: NO PAIN (0)

## 2023-07-31 NOTE — PROGRESS NOTES
"Nursing Notes:   Valerie Rico LPN  2023  2:02 PM  Sign at exiting of workspace  Chief Complaint   Patient presents with    Prenatal Care     14.5 weeks       Initial /68   Pulse 75   Temp 98  F (36.7  C) (Temporal)   Resp 16   Wt 78.6 kg (173 lb 3.2 oz)   LMP 04/10/2023   SpO2 99%   Breastfeeding No   BMI 29.04 kg/m   Estimated body mass index is 29.04 kg/m  as calculated from the following:    Height as of 7/3/23: 1.645 m (5' 4.75\").    Weight as of this encounter: 78.6 kg (173 lb 3.2 oz).  Medication Reconciliation: complete    FOOD SECURITY SCREENING QUESTIONS  Hunger Vital Signs:  Within the past 12 months we worried whether our food would run out before we got money to buy more. Never  Within the past 12 months the food we bought just didn't last and we didn't have money to get more. Never        Advance care directive on file? no  Advance care directive provided to patient? yes     Valerie Rico LPN       LMP 04/10/2023     Jenny Hadley is a 32 year old female   at 14w5d     Has been feeling baby movement, not consistent but can feel this.  Some mild nausea.    Sleep dept upon other children sleeping.      1. Reviewed normal physiologic changes in mom, self cares.  2.  Discussed current fetal development.    Lab Results   Component Value Date    ABO A 2021    RH Pos 2021    [id number] A Rh Positive 2016     Hemoglobin   Date Value Ref Range Status   2023 12.2 11.7 - 15.7 g/dL Final   2022 12.8 11.7 - 15.7 g/dL Final   2021 13.0 11.7 - 15.7 g/dL Final   2019 10.4 (L) 11.7 - 15.7 g/dL Final           ICD-10-CM    1. High-risk pregnancy, unspecified trimester  O09.90       2. History of molar pregnancy  Z87.59           -- Datin week US RAVI: is  not with LMP EDC ; EDC is 24  -- PNLs: collected today including the following              CBC              RPR              Hep B S Ag              Hep C              Rubella   "            HIV              ABO/Rh type              Antibody Screen    UA/UC   GONORRHEA/CT  -- Genetic Screening: Discussed with patient, she has decided to maybe do quad instead of  NIPT and carrier screening  -- Anatomy US: Planned for approximately 20 weeks gestation. Currently no indication for Level II   -- Immunizations: Plans for influenza:  in season   Covid completed x2  Tdap at approximately 28 weeks gestation  -- 3rd TM labs including CBC, RPR: Planned for after 27 weeks gestation  -- 1 hr GTT: Planned for  24-28 weeks   -- GBS: Planned for 36 weeks - history of positive   -- Postpartum Planning/PPBC: To be discussed   -- Delivery Planning: routine care   -- Return to clinic in 4 weeks for OB follow up visit  -- Planning to do at next visit: discussed NIPT vs quad    Placenta to pathology at delivery      Pia Tong MD, FAAFP

## 2023-07-31 NOTE — NURSING NOTE
"Chief Complaint   Patient presents with    Prenatal Care     14.5 weeks       Initial /68   Pulse 75   Temp 98  F (36.7  C) (Temporal)   Resp 16   Wt 78.6 kg (173 lb 3.2 oz)   LMP 04/10/2023   SpO2 99%   Breastfeeding No   BMI 29.04 kg/m   Estimated body mass index is 29.04 kg/m  as calculated from the following:    Height as of 7/3/23: 1.645 m (5' 4.75\").    Weight as of this encounter: 78.6 kg (173 lb 3.2 oz).  Medication Reconciliation: complete    FOOD SECURITY SCREENING QUESTIONS  Hunger Vital Signs:  Within the past 12 months we worried whether our food would run out before we got money to buy more. Never  Within the past 12 months the food we bought just didn't last and we didn't have money to get more. Never        Advance care directive on file? no  Advance care directive provided to patient? yes     Valerie Rico LPN    "

## 2023-08-29 ENCOUNTER — PRENATAL OFFICE VISIT (OUTPATIENT)
Dept: FAMILY MEDICINE | Facility: OTHER | Age: 33
End: 2023-08-29
Attending: FAMILY MEDICINE
Payer: COMMERCIAL

## 2023-08-29 VITALS
DIASTOLIC BLOOD PRESSURE: 62 MMHG | OXYGEN SATURATION: 98 % | RESPIRATION RATE: 18 BRPM | HEIGHT: 65 IN | BODY MASS INDEX: 29.51 KG/M2 | SYSTOLIC BLOOD PRESSURE: 110 MMHG | WEIGHT: 177.13 LBS | HEART RATE: 74 BPM | TEMPERATURE: 97.4 F

## 2023-08-29 DIAGNOSIS — O09.90 HIGH-RISK PREGNANCY, UNSPECIFIED TRIMESTER: Primary | ICD-10-CM

## 2023-08-29 DIAGNOSIS — Z87.59 HISTORY OF MOLAR PREGNANCY: ICD-10-CM

## 2023-08-29 PROCEDURE — 99207 PR OB VISIT-NO CHARGE - GICH ONLY: CPT | Performed by: FAMILY MEDICINE

## 2023-08-29 ASSESSMENT — PAIN SCALES - GENERAL: PAINLEVEL: NO PAIN (0)

## 2023-08-29 NOTE — PROGRESS NOTES
"Nursing Notes:        /62   Pulse 74   Temp 97.4  F (36.3  C) (Tympanic)   Resp 18   Ht 1.651 m (5' 5\")   Wt 80.3 kg (177 lb 2 oz)   LMP 04/10/2023   SpO2 98%   BMI 29.48 kg/m      Jenny Hadley is a 32 year old female   at 18w6d     Has been feeling baby movement; energy is better      1. Reviewed normal physiologic changes in mom, self cares.  2.  Discussed current fetal development.    Lab Results   Component Value Date    ABO A 2021    RH Pos 2021    [id number] A Rh Positive 2016     Hemoglobin   Date Value Ref Range Status   2023 12.2 11.7 - 15.7 g/dL Final   2022 12.8 11.7 - 15.7 g/dL Final   2021 13.0 11.7 - 15.7 g/dL Final   2019 10.4 (L) 11.7 - 15.7 g/dL Final           ICD-10-CM    1. High-risk pregnancy, unspecified trimester  O09.90       2. History of molar pregnancy  Z87.59           -- Datin week US RAVI: is  not with LMP EDC ; EDC is 24  -- PNLs: collected               CBC              RPR              Hep B S Ag              Hep C              Rubella              HIV              ABO/Rh type              Antibody Screen    UA/UC   GONORRHEA/CT  -- Genetic Screening: Discussed with patient, she has decided to maybe do quad instead of  NIPT and carrier screening  -- Anatomy US: Planned for approximately 20 weeks gestation. Currently no indication for Level II   -- Immunizations: Plans for influenza:  in season   Covid completed x2  Tdap at approximately 28 weeks gestation  -- 3rd TM labs including CBC, RPR: Planned for after 27 weeks gestation  -- 1 hr GTT: Planned for  24-28 weeks   -- GBS: Planned for 36 weeks - history of positive   -- Postpartum Planning/PPBC: To be discussed   -- Delivery Planning: routine care   -- Return to clinic in 4 weeks for OB follow up visit  -- Planning to do at next visit: discussed NIPT vs quad    Placenta to pathology at delivery      Pia Tong MD, FAAFP   "

## 2023-08-29 NOTE — NURSING NOTE
"Chief Complaint   Patient presents with    Prenatal Care     18w6d       Initial /62   Pulse 74   Temp 97.4  F (36.3  C) (Tympanic)   Resp 18   Ht 1.651 m (5' 5\")   Wt 80.3 kg (177 lb 2 oz)   LMP 04/10/2023   SpO2 98%   BMI 29.48 kg/m   Estimated body mass index is 29.48 kg/m  as calculated from the following:    Height as of this encounter: 1.651 m (5' 5\").    Weight as of this encounter: 80.3 kg (177 lb 2 oz).  Medication Reconciliation: complete    Marlene Hewitt LPN    Advanced Care Directive reviewed.       "

## 2023-09-05 ENCOUNTER — HOSPITAL ENCOUNTER (OUTPATIENT)
Dept: ULTRASOUND IMAGING | Facility: OTHER | Age: 33
Discharge: HOME | End: 2023-09-05
Attending: FAMILY MEDICINE | Admitting: FAMILY MEDICINE
Payer: COMMERCIAL

## 2023-09-05 DIAGNOSIS — O09.90 HIGH-RISK PREGNANCY, UNSPECIFIED TRIMESTER: ICD-10-CM

## 2023-09-05 PROCEDURE — 76805 OB US >/= 14 WKS SNGL FETUS: CPT

## 2023-09-14 ENCOUNTER — MYC MEDICAL ADVICE (OUTPATIENT)
Dept: FAMILY MEDICINE | Facility: OTHER | Age: 33
End: 2023-09-14
Payer: COMMERCIAL

## 2023-09-26 ENCOUNTER — PRENATAL OFFICE VISIT (OUTPATIENT)
Dept: FAMILY MEDICINE | Facility: OTHER | Age: 33
End: 2023-09-26
Attending: FAMILY MEDICINE
Payer: COMMERCIAL

## 2023-09-26 VITALS
SYSTOLIC BLOOD PRESSURE: 114 MMHG | OXYGEN SATURATION: 98 % | DIASTOLIC BLOOD PRESSURE: 58 MMHG | RESPIRATION RATE: 12 BRPM | TEMPERATURE: 97.8 F | HEART RATE: 80 BPM | WEIGHT: 183.6 LBS | HEIGHT: 65 IN | BODY MASS INDEX: 30.59 KG/M2

## 2023-09-26 DIAGNOSIS — O09.90 HIGH-RISK PREGNANCY, UNSPECIFIED TRIMESTER: Primary | ICD-10-CM

## 2023-09-26 DIAGNOSIS — Z87.59 HISTORY OF MOLAR PREGNANCY: ICD-10-CM

## 2023-09-26 PROCEDURE — 99207 PR OB VISIT-NO CHARGE - GICH ONLY: CPT | Performed by: FAMILY MEDICINE

## 2023-09-26 ASSESSMENT — PAIN SCALES - GENERAL: PAINLEVEL: MODERATE PAIN (4)

## 2023-09-26 NOTE — PROGRESS NOTES
"Nursing Notes:  Nursing Notes:   Basia Holland LPN  2023  9:30 AM  Signed  Chief Complaint   Patient presents with    Prenatal Care     OB check - 22 weeks, 6 days       Initial /58 (BP Location: Right arm, Patient Position: Sitting, Cuff Size: Adult Regular)   Pulse 80   Temp 97.8  F (36.6  C) (Temporal)   Resp 12   Ht 1.651 m (5' 5\")   Wt 83.3 kg (183 lb 9.6 oz)   LMP 04/10/2023   SpO2 98%   BMI 30.55 kg/m   Estimated body mass index is 30.55 kg/m  as calculated from the following:    Height as of this encounter: 1.651 m (5' 5\").    Weight as of this encounter: 83.3 kg (183 lb 9.6 oz).    Medication Reconciliation: complete      FOOD SECURITY SCREENING QUESTIONS:    The next two questions are to help us understand your food security.  If you are feeling you need any assistance in this area, we have resources available to support you today.    Hunger Vital Signs:  Within the past 12 months we worried whether our food would run out before we got money to buy more. Never  Within the past 12 months the food we bought just didn't last and we didn't have money to get more. Never        Advance care plan reviewed      Basia Holland LPN on 2023 at 9:30 AM              /58 (BP Location: Right arm, Patient Position: Sitting, Cuff Size: Adult Regular)   Pulse 80   Temp 97.8  F (36.6  C) (Temporal)   Resp 12   Ht 1.651 m (5' 5\")   Wt 83.3 kg (183 lb 9.6 oz)   LMP 04/10/2023   SpO2 98%   BMI 30.55 kg/m      Jenny Hadley is a 32 year old female   at 22w6d     Lots of sciatica and heartburn     1. Reviewed normal physiologic changes in mom, self cares.  2.  Discussed current fetal development.    Lab Results   Component Value Date    ABO A 2021    RH Pos 2021    [id number] A Rh Positive 2016     Hemoglobin   Date Value Ref Range Status   2023 12.2 11.7 - 15.7 g/dL Final   2022 12.8 11.7 - 15.7 g/dL Final   2021 13.0 11.7 - 15.7 g/dL Final "   2019 10.4 (L) 11.7 - 15.7 g/dL Final             -- Datin week US RAVI: is  not with LMP EDC ; EDC is 24  -- PNLs: collected               CBC              RPR              Hep B S Ag              Hep C              Rubella              HIV              ABO/Rh type A POS                Antibody Screen    UA/UC   GONORRHEA/CT  -- Genetic Screening: Discussed with patient, she has decided to maybe do quad instead of  NIPT and carrier screening  -- Anatomy US: Planned for approximately 20 weeks gestation. Completed - EFW 88% and will recheck in third trimester   -- Immunizations: Plans for influenza:  discussed 2023, likely at some point   Covid completed x2  Tdap at approximately 28 weeks gestation  -- 3rd TM labs including CBC, RPR: Planned for after 27 weeks gestation  -- 1 hr GTT: Planned for  24-28 weeks   -- GBS: Planned for 36 weeks - history of positive   -- Postpartum Planning/PPBC: To be discussed   -- Delivery Planning: routine care   -- Return to clinic in 4 weeks for OB follow up visit  -- Planning to do at next visit: labs and boostrix, maybe flu     Placenta to pathology at delivery      Pia Tong MD, FAAFP

## 2023-09-26 NOTE — NURSING NOTE
"Chief Complaint   Patient presents with    Prenatal Care     OB check - 22 weeks, 6 days       Initial /58 (BP Location: Right arm, Patient Position: Sitting, Cuff Size: Adult Regular)   Pulse 80   Temp 97.8  F (36.6  C) (Temporal)   Resp 12   Ht 1.651 m (5' 5\")   Wt 83.3 kg (183 lb 9.6 oz)   LMP 04/10/2023   SpO2 98%   BMI 30.55 kg/m   Estimated body mass index is 30.55 kg/m  as calculated from the following:    Height as of this encounter: 1.651 m (5' 5\").    Weight as of this encounter: 83.3 kg (183 lb 9.6 oz).    Medication Reconciliation: complete      FOOD SECURITY SCREENING QUESTIONS:    The next two questions are to help us understand your food security.  If you are feeling you need any assistance in this area, we have resources available to support you today.    Hunger Vital Signs:  Within the past 12 months we worried whether our food would run out before we got money to buy more. Never  Within the past 12 months the food we bought just didn't last and we didn't have money to get more. Never        Advance care plan reviewed      Basia Holland LPN on 9/26/2023 at 9:30 AM      "

## 2023-10-23 ENCOUNTER — LAB (OUTPATIENT)
Dept: LAB | Facility: OTHER | Age: 33
End: 2023-10-23
Attending: FAMILY MEDICINE
Payer: COMMERCIAL

## 2023-10-23 DIAGNOSIS — O09.90 HIGH-RISK PREGNANCY, UNSPECIFIED TRIMESTER: ICD-10-CM

## 2023-10-23 DIAGNOSIS — O99.013 ANEMIA DURING PREGNANCY IN THIRD TRIMESTER: ICD-10-CM

## 2023-10-23 DIAGNOSIS — O09.90 HIGH-RISK PREGNANCY, UNSPECIFIED TRIMESTER: Primary | ICD-10-CM

## 2023-10-23 LAB
ERYTHROCYTE [DISTWIDTH] IN BLOOD BY AUTOMATED COUNT: 12.3 % (ref 10–15)
GLUCOSE 1H P 50 G GLC PO SERPL-MCNC: 108 MG/DL (ref 70–129)
HCT VFR BLD AUTO: 32.9 % (ref 35–47)
HGB BLD-MCNC: 10.9 G/DL (ref 11.7–15.7)
MCH RBC QN AUTO: 28.7 PG (ref 26.5–33)
MCHC RBC AUTO-ENTMCNC: 33.1 G/DL (ref 31.5–36.5)
MCV RBC AUTO: 87 FL (ref 78–100)
PLATELET # BLD AUTO: 253 10E3/UL (ref 150–450)
RBC # BLD AUTO: 3.8 10E6/UL (ref 3.8–5.2)
WBC # BLD AUTO: 8.3 10E3/UL (ref 4–11)

## 2023-10-23 PROCEDURE — 36415 COLL VENOUS BLD VENIPUNCTURE: CPT | Mod: ZL

## 2023-10-23 PROCEDURE — 85027 COMPLETE CBC AUTOMATED: CPT | Mod: ZL

## 2023-10-23 PROCEDURE — 86780 TREPONEMA PALLIDUM: CPT | Mod: ZL

## 2023-10-23 PROCEDURE — 82950 GLUCOSE TEST: CPT | Mod: ZL

## 2023-10-23 RX ORDER — FERROUS GLUCONATE 324(38)MG
324 TABLET ORAL
Qty: 90 TABLET | Refills: 3 | Status: SHIPPED | OUTPATIENT
Start: 2023-10-23

## 2023-10-24 ENCOUNTER — PRENATAL OFFICE VISIT (OUTPATIENT)
Dept: FAMILY MEDICINE | Facility: OTHER | Age: 33
End: 2023-10-24
Attending: FAMILY MEDICINE
Payer: COMMERCIAL

## 2023-10-24 VITALS
WEIGHT: 189.4 LBS | RESPIRATION RATE: 14 BRPM | DIASTOLIC BLOOD PRESSURE: 70 MMHG | HEIGHT: 65 IN | SYSTOLIC BLOOD PRESSURE: 120 MMHG | HEART RATE: 78 BPM | TEMPERATURE: 97.8 F | OXYGEN SATURATION: 99 % | BODY MASS INDEX: 31.56 KG/M2

## 2023-10-24 DIAGNOSIS — O09.90 HIGH-RISK PREGNANCY, UNSPECIFIED TRIMESTER: Primary | ICD-10-CM

## 2023-10-24 DIAGNOSIS — O99.013 ANEMIA DURING PREGNANCY IN THIRD TRIMESTER: ICD-10-CM

## 2023-10-24 LAB — T PALLIDUM AB SER QL: NONREACTIVE

## 2023-10-24 PROCEDURE — 99207 PR OB VISIT-NO CHARGE - GICH ONLY: CPT | Performed by: FAMILY MEDICINE

## 2023-10-24 PROCEDURE — 90472 IMMUNIZATION ADMIN EACH ADD: CPT | Performed by: FAMILY MEDICINE

## 2023-10-24 PROCEDURE — 90471 IMMUNIZATION ADMIN: CPT | Performed by: FAMILY MEDICINE

## 2023-10-24 PROCEDURE — 90686 IIV4 VACC NO PRSV 0.5 ML IM: CPT | Performed by: FAMILY MEDICINE

## 2023-10-24 PROCEDURE — 90715 TDAP VACCINE 7 YRS/> IM: CPT | Performed by: FAMILY MEDICINE

## 2023-10-24 ASSESSMENT — PAIN SCALES - GENERAL: PAINLEVEL: MODERATE PAIN (4)

## 2023-10-24 NOTE — PROGRESS NOTES
"Nursing Notes:   Basia Holland LPN  10/24/2023  9:29 AM  Signed  Chief Complaint   Patient presents with    Prenatal Care     OB check - 26 weeks 6 days       Initial /70 (BP Location: Right arm, Patient Position: Sitting, Cuff Size: Adult Regular)   Pulse 78   Temp 97.8  F (36.6  C) (Temporal)   Resp 14   Ht 1.651 m (5' 5\")   Wt 85.9 kg (189 lb 6.4 oz)   LMP 04/10/2023   SpO2 99%   BMI 31.52 kg/m   Estimated body mass index is 31.52 kg/m  as calculated from the following:    Height as of this encounter: 1.651 m (5' 5\").    Weight as of this encounter: 85.9 kg (189 lb 6.4 oz).    Medication Reconciliation: complete      Advance care plan reviewed      Basia Holland LPN on 10/24/2023 at 9:28 AM              /70 (BP Location: Right arm, Patient Position: Sitting, Cuff Size: Adult Regular)   Pulse 78   Temp 97.8  F (36.6  C) (Temporal)   Resp 14   Ht 1.651 m (5' 5\")   Wt 85.9 kg (189 lb 6.4 oz)   LMP 04/10/2023   SpO2 99%   BMI 31.52 kg/m      Jenny Hadley is a 32 year old female   at  26w6d     LBP - better with walking and stretching and not carrying.  Has to sleep on her right side.  Labs done yesterday     1. Reviewed normal physiologic changes in mom, self cares.  2.  Discussed current fetal development.    Lab Results   Component Value Date    ABO A 2021    RH Pos 2021    [id number] A Rh Positive 2016     Hemoglobin   Date Value Ref Range Status   10/23/2023 10.9 (L) 11.7 - 15.7 g/dL Final   2023 12.2 11.7 - 15.7 g/dL Final   2021 13.0 11.7 - 15.7 g/dL Final   2019 10.4 (L) 11.7 - 15.7 g/dL Final             -- Datin week US RAVI: is  not with LMP EDC ; EDC is 1/24/24  -- PNLs: collected               CBC              RPR              Hep B S Ag              Hep C              Rubella              HIV              ABO/Rh type A POS                Antibody Screen    UA/UC   GONORRHEA/CT  -- Genetic Screening: Discussed with " patient, she has decided to maybe do quad instead of  NIPT and carrier screening  -- Anatomy US: Planned for approximately 20 weeks gestation. Completed - EFW 88% and will recheck in third trimester   -- Immunizations: Plans for influenza:  discussed 9/26/2023, likely at some point   Covid completed x2  Tdap 10/24/2023   Flu 10/24/2023   -- 3rd TM labs including CBC, RPR: completed, hemoglobin low and iron started   -- 1 hr GTT: within normal limits   -- GBS: Planned for 36 weeks - history of positive   -- Postpartum Planning/PPBC: To be discussed   -- Delivery Planning: routine care   -- Return to clinic in 4 weeks for OB follow up visit  -- Planning to do at next visit:  routine follow up ; ultrasound for growth   Placenta to pathology at delivery        Pia Tong MD, FAAFP

## 2023-10-24 NOTE — NURSING NOTE
"Chief Complaint   Patient presents with    Prenatal Care     OB check - 26 weeks 6 days       Initial /70 (BP Location: Right arm, Patient Position: Sitting, Cuff Size: Adult Regular)   Pulse 78   Temp 97.8  F (36.6  C) (Temporal)   Resp 14   Ht 1.651 m (5' 5\")   Wt 85.9 kg (189 lb 6.4 oz)   LMP 04/10/2023   SpO2 99%   BMI 31.52 kg/m   Estimated body mass index is 31.52 kg/m  as calculated from the following:    Height as of this encounter: 1.651 m (5' 5\").    Weight as of this encounter: 85.9 kg (189 lb 6.4 oz).    Medication Reconciliation: complete      Advance care plan reviewed      Basia Holland LPN on 10/24/2023 at 9:28 AM      "

## 2023-11-21 ENCOUNTER — OFFICE VISIT (OUTPATIENT)
Dept: FAMILY MEDICINE | Facility: OTHER | Age: 33
End: 2023-11-21
Attending: NURSE PRACTITIONER
Payer: COMMERCIAL

## 2023-11-21 ENCOUNTER — TELEPHONE (OUTPATIENT)
Dept: FAMILY MEDICINE | Facility: OTHER | Age: 33
End: 2023-11-21

## 2023-11-21 VITALS
WEIGHT: 194.1 LBS | HEIGHT: 65 IN | DIASTOLIC BLOOD PRESSURE: 68 MMHG | RESPIRATION RATE: 16 BRPM | SYSTOLIC BLOOD PRESSURE: 118 MMHG | HEART RATE: 97 BPM | TEMPERATURE: 97.8 F | BODY MASS INDEX: 32.34 KG/M2 | OXYGEN SATURATION: 97 %

## 2023-11-21 DIAGNOSIS — J02.9 SORE THROAT: ICD-10-CM

## 2023-11-21 DIAGNOSIS — J02.0 STREP PHARYNGITIS: Primary | ICD-10-CM

## 2023-11-21 DIAGNOSIS — H92.02 LEFT EAR PAIN: ICD-10-CM

## 2023-11-21 LAB — GROUP A STREP BY PCR: DETECTED

## 2023-11-21 PROCEDURE — 99213 OFFICE O/P EST LOW 20 MIN: CPT | Performed by: NURSE PRACTITIONER

## 2023-11-21 PROCEDURE — 87651 STREP A DNA AMP PROBE: CPT | Mod: ZL | Performed by: NURSE PRACTITIONER

## 2023-11-21 RX ORDER — PENICILLIN V POTASSIUM 500 MG/1
500 TABLET, FILM COATED ORAL 2 TIMES DAILY
Qty: 20 TABLET | Refills: 0 | Status: SHIPPED | OUTPATIENT
Start: 2023-11-21 | End: 2023-12-01

## 2023-11-21 ASSESSMENT — PAIN SCALES - GENERAL: PAINLEVEL: SEVERE PAIN (7)

## 2023-11-21 NOTE — TELEPHONE ENCOUNTER
[facility] is sending in a script for strep- patient wants you to make sure it is ok due to pregnancy-- she does not know the name of it please call her

## 2023-11-21 NOTE — TELEPHONE ENCOUNTER
Patient was seen in  today and was prescribed penicillin (Veetid) for strep throat.  Patient inquiring if this is ok to take while pregnant.    Basia Holland LPN on 11/21/2023 at 10:46 AM

## 2023-11-21 NOTE — PROGRESS NOTES
ASSESSMENT/PLAN:     I have reviewed the nursing notes.  I have reviewed the findings, diagnosis, plan and need for follow up with the patient.          1. Left ear pain    Referred ear pain from throat.  Normal ear exam.    2. Sore throat    - Group A Streptococcus PCR Throat Swab    3. Strep pharyngitis    - penicillin V (VEETID) 500 MG tablet; Take 1 tablet (500 mg) by mouth 2 times daily for 10 days  Dispense: 20 tablet; Refill: 0    Positive strep PCR test     New toothbrush in 2 days     Symptomatic treatment - Encouraged fluids, salt water gargles, honey, elevation, humidifier, lozenges, tea, soup, smoothies, popsicles, etc     May use over-the-counter Tylenol PRN    Discussed warning signs/symptoms indicative of need to f/u  Follow up if symptoms persist or worsen or concerns      I explained my diagnostic considerations and recommendations to the patient, who voiced understanding and agreement with the treatment plan. All questions were answered. We discussed potential side effects of any prescribed or recommended therapies, as well as expectations for response to treatments.    Farhana Lin NP  [facility]      SUBJECTIVE:   Jenny Hadley is a 33 year old female who presents to clinic today for the following health issues:  Sore throat and ear pain    HPI  Sore throat and left ear, minimal nasal congestion/drainage, throat clearing, headaches, intermittent chills, fatigue, mild nausea, and mildly decreased appetite for the past 2 days.  No fevers.  No cough or shortness of breath.    Drinking honey water and using salt water gargles        Past Medical History:   Diagnosis Date    Anemia     Carrier of group B Streptococcus     Complication of anesthesia     History of prior pregnancies 2019 1/24    Molar pregnancy 05/23/2018     Past Surgical History:   Procedure Laterality Date    DENTAL SURGERY      wisdom teeth    DILATION AND CURETTAGE SUCTION N/A 5/16/2018     "Procedure: DILATION AND CURETTAGE SUCTION;  Suction Dilation & Curettage;  Surgeon: Wily Mars MD;  Location:  OR     Social History     Tobacco Use    Smoking status: Never     Passive exposure: Past    Smokeless tobacco: Never   Substance Use Topics    Alcohol use: Not Currently     Alcohol/week: 0.0 standard drinks of alcohol     Comment: occasional     Current Outpatient Medications   Medication Sig Dispense Refill    ferrous gluconate (FERGON) 324 (38 Fe) MG tablet Take 1 tablet (324 mg) by mouth daily with food 90 tablet 3    Prenatal Vit-Fe Fumarate-FA (PRENATAL MULTIVITAMIN  PLUS IRON) 27-1 MG TABS Take 1 tablet by mouth daily 90 tablet 3     Allergies   Allergen Reactions    Cats Itching and Shortness Of Breath         Past medical history, past surgical history, current medications and allergies reviewed and accurate to the best of my knowledge.        OBJECTIVE:     /68   Pulse 97   Temp 97.8  F (36.6  C) (Tympanic)   Resp 16   Ht 1.651 m (5' 5\")   Wt 88 kg (194 lb 1.6 oz)   LMP 04/10/2023   SpO2 97%   Breastfeeding No   BMI 32.30 kg/m    Body mass index is 32.3 kg/m .      Physical Exam  General Appearance: Well appearing adult female, appropriate appearance for age. No acute distress  Ears: Left TM intact, no erythema, no effusion, no bulging, no purulence.  Right TM intact, no erythema, no effusion, no bulging, no purulence.  Left auditory canal clear without drainage or bleeding.  Right auditory canal clear without drainage or bleeding.  Normal external ears, non tender.  Eyes: conjunctivae normal without erythema or irritation, corneas clear, no drainage or crusting, no eyelid swelling, pupils equal   Orophayrnx: moist mucous membranes, pharynx with erythema, tonsils without hypertrophy, tonsils with erythema, no tonsillar exudates, no oral lesions, no palate petechiae, no post nasal drip seen, no trismus, voice clear.    Nose:  No noted drainage or congestion   Neck:  Single " left anterior cervical lymph node enlargement with tenderness to palpation   Respiratory: normal chest wall and respirations.  Normal effort.  Clear to auscultation bilaterally, no wheezing, crackles or rhonchi.  No increased work of breathing.  No cough appreciated.  Cardiac: RRR with no murmurs  Musculoskeletal:  Equal movement of bilateral upper extremities.  Equal movement of bilateral lower extremities.  Normal gait.    Psychological: normal affect, alert, oriented, and pleasant.       Labs:  Results for orders placed or performed in visit on 11/21/23   Group A Streptococcus PCR Throat Swab     Status: Abnormal    Specimen: Throat; Swab   Result Value Ref Range    Group A strep by PCR Detected (A) Not Detected    Narrative    The Xpert Xpress Strep A test, performed on the [employer]  Instrument Systems, is a rapid, qualitative in vitro diagnostic test for the detection of Streptococcus pyogenes (Group A ß-hemolytic Streptococcus, Strep A) in throat swab specimens from patients with signs and symptoms of pharyngitis. The Xpert Xpress Strep A test can be used as an aid in the diagnosis of Group A Streptococcal pharyngitis. The assay is not intended to monitor treatment for Group A Streptococcus infections. The Xpert Xpress Strep A test utilizes an automated real-time polymerase chain reaction (PCR) to detect Streptococcus pyogenes DNA.

## 2023-11-21 NOTE — TELEPHONE ENCOUNTER
Called patient with verbal okay to take medication per Dr. Ifeanyi Stafford.    Basia Holland LPN on 11/21/2023 at 12:41 PM

## 2023-11-21 NOTE — NURSING NOTE
"Chief Complaint   Patient presents with    Pharyngitis    Otalgia     Left        Initial /68   Pulse 97   Temp 97.8  F (36.6  C) (Tympanic)   Resp 16   Ht 1.651 m (5' 5\")   Wt 88 kg (194 lb 1.6 oz)   LMP 04/10/2023   SpO2 97%   Breastfeeding No   BMI 32.30 kg/m   Estimated body mass index is 32.3 kg/m  as calculated from the following:    Height as of this encounter: 1.651 m (5' 5\").    Weight as of this encounter: 88 kg (194 lb 1.6 oz).  Medication Review: complete    The next two questions are to help us understand your food security.  If you are feeling you need any assistance in this area, we have resources available to support you today.          9/26/2023   SDOH- Food Insecurity   Within the past 12 months, did you worry that your food would run out before you got money to buy more? N   Within the past 12 months, did the food you bought just not last and you didn t have money to get more? N         Health Care Directive:  Patient does not have a Health Care Directive or Living Will: Discussed advance care planning with patient; however, patient declined at this time.    Ekaterina Charlton CMA      "

## 2023-11-28 ENCOUNTER — HOSPITAL ENCOUNTER (OUTPATIENT)
Dept: ULTRASOUND IMAGING | Facility: OTHER | Age: 33
Discharge: HOME | End: 2023-11-28
Attending: FAMILY MEDICINE
Payer: COMMERCIAL

## 2023-11-28 ENCOUNTER — PRENATAL OFFICE VISIT (OUTPATIENT)
Dept: FAMILY MEDICINE | Facility: OTHER | Age: 33
End: 2023-11-28
Attending: FAMILY MEDICINE
Payer: COMMERCIAL

## 2023-11-28 VITALS
HEART RATE: 103 BPM | HEIGHT: 65 IN | OXYGEN SATURATION: 97 % | WEIGHT: 195 LBS | RESPIRATION RATE: 16 BRPM | DIASTOLIC BLOOD PRESSURE: 68 MMHG | TEMPERATURE: 97.3 F | BODY MASS INDEX: 32.49 KG/M2 | SYSTOLIC BLOOD PRESSURE: 118 MMHG

## 2023-11-28 DIAGNOSIS — O99.013 ANEMIA DURING PREGNANCY IN THIRD TRIMESTER: ICD-10-CM

## 2023-11-28 DIAGNOSIS — O09.90 HIGH-RISK PREGNANCY, UNSPECIFIED TRIMESTER: Primary | ICD-10-CM

## 2023-11-28 DIAGNOSIS — Z87.59 HISTORY OF MOLAR PREGNANCY: ICD-10-CM

## 2023-11-28 DIAGNOSIS — O09.90 HIGH-RISK PREGNANCY, UNSPECIFIED TRIMESTER: ICD-10-CM

## 2023-11-28 PROCEDURE — 99207 PR OB VISIT-NO CHARGE - GICH ONLY: CPT | Performed by: FAMILY MEDICINE

## 2023-11-28 PROCEDURE — 76816 OB US FOLLOW-UP PER FETUS: CPT

## 2023-11-28 ASSESSMENT — PAIN SCALES - GENERAL: PAINLEVEL: NO PAIN (0)

## 2023-11-28 NOTE — NURSING NOTE
"Chief Complaint   Patient presents with    Prenatal Care     OB check - 31 weeks, 6 days       Initial /68 (BP Location: Right arm, Patient Position: Sitting, Cuff Size: Adult Regular)   Pulse 103   Temp 97.3  F (36.3  C) (Temporal)   Resp 16   Ht 1.651 m (5' 5\")   Wt 88.5 kg (195 lb)   LMP 04/10/2023   SpO2 97%   BMI 32.45 kg/m   Estimated body mass index is 32.45 kg/m  as calculated from the following:    Height as of this encounter: 1.651 m (5' 5\").    Weight as of this encounter: 88.5 kg (195 lb).  Medication Review: complete    The next two questions are to help us understand your food security.  If you are feeling you need any assistance in this area, we have resources available to support you today.          11/28/2023   SDOH- Food Insecurity   Within the past 12 months, did you worry that your food would run out before you got money to buy more? N   Within the past 12 months, did the food you bought just not last and you didn t have money to get more? N       Health Care Directive:  Patient does not have a Health Care Directive or Living Will: Patient states has Advance Directive and will bring in a copy to clinic.    Basia Holland LPN      "

## 2023-11-28 NOTE — PROGRESS NOTES
"Nursing Notes:   Basia Holland LPN  2023 10:12 AM  Signed  Chief Complaint   Patient presents with    Prenatal Care     OB check - 31 weeks, 6 days       Initial /68 (BP Location: Right arm, Patient Position: Sitting, Cuff Size: Adult Regular)   Pulse 103   Temp 97.3  F (36.3  C) (Temporal)   Resp 16   Ht 1.651 m (5' 5\")   Wt 88.5 kg (195 lb)   LMP 04/10/2023   SpO2 97%   BMI 32.45 kg/m   Estimated body mass index is 32.45 kg/m  as calculated from the following:    Height as of this encounter: 1.651 m (5' 5\").    Weight as of this encounter: 88.5 kg (195 lb).  Medication Review: complete    The next two questions are to help us understand your food security.  If you are feeling you need any assistance in this area, we have resources available to support you today.          2023   SDOH- Food Insecurity   Within the past 12 months, did you worry that your food would run out before you got money to buy more? N   Within the past 12 months, did the food you bought just not last and you didn t have money to get more? N       Health Care Directive:  Patient does not have a Health Care Directive or Living Will: Patient states has Advance Directive and will bring in a copy to clinic.    Basia Holland LPN          /68 (BP Location: Right arm, Patient Position: Sitting, Cuff Size: Adult Regular)   Pulse 103   Temp 97.3  F (36.3  C) (Temporal)   Resp 16   Ht 1.651 m (5' 5\")   Wt 88.5 kg (195 lb)   LMP 04/10/2023   SpO2 97%   BMI 32.45 kg/m      Jenny Hadley is a 32 year old female   at  31w6d     Follow up ultrasound done today.    Results for orders placed or performed during the hospital encounter of 23   US OB >14 Weeks Follow Up     Status: None    Narrative    OB ULTRASOUND - Transabdominal     Clinical: EFW, amber; High-risk pregnancy, unspecified trimester.   Gestation: 1  Comparison: 2023  Presentation: Cephalic  Cardiac Activity: Regular at 140 " bpm  Movement: Yes  Placenta: Anterior ndGndrndanddndend:nd nd2nd Amniotic Fluid: Normal SHASHI: 9.2 cm    Measurements ([place]):  BPD: 32%  HC: 40%  AC: 93%  FL: 76%    Estimated Fetal Weight: 2182 grams  HC/AC: 0.99  US age (current): 33 weeks 1 days  Gestational age: 31 weeks 6 days  US EDC (preferred):  24   %WT for EGA (preferred dating): 86 %      Impression    IMPRESSION: Single viable intrauterine pregnancy demonstrating an  estimated fetal weight at the 86th percentile for EGA.    MALIK QUEEN MD         SYSTEM ID:  [id number]       Overall - feels pretty ok.       Recent strep.      1. Reviewed normal physiologic changes in mom, self cares.  2.  Discussed current fetal development.    Lab Results   Component Value Date    ABO A 2021    RH Pos 2021    [id number] A Rh Positive 2016     Hemoglobin   Date Value Ref Range Status   10/23/2023 10.9 (L) 11.7 - 15.7 g/dL Final   2023 12.2 11.7 - 15.7 g/dL Final   2021 13.0 11.7 - 15.7 g/dL Final   2019 10.4 (L) 11.7 - 15.7 g/dL Final             -- Datin week US RAVI: is  not with LMP EDC ; EDC is 24  -- PNLs: collected               CBC              RPR              Hep B S Ag              Hep C              Rubella              HIV              ABO/Rh type A POS                Antibody Screen    UA/UC   GONORRHEA/CT  -- Genetic Screening: Discussed with patient, she has decided to maybe do quad instead of  NIPT and carrier screening  -- Anatomy US: Planned for approximately 20 weeks gestation. Completed - EFW 88% and will recheck in third trimester   -- Immunizations: Plans for influenza:  discussed 2023, likely at some point   Covid completed x2  Tdap 10/24/2023   Flu 10/24/2023   -- 3rd TM labs including CBC, RPR: completed, hemoglobin low and iron started   -- 1 hr GTT: within normal limits   -- GBS: Planned for 36 weeks - history of positive   -- Postpartum Planning/PPBC: condoms and episodic abstinence    -- Delivery  Planning: routine care   -- Return to clinic in 2 weeks for OB follow up visit  -- Planning to do at next visit:  RSV vaccine  Placenta to pathology at delivery        ICD-10-CM    1. High-risk pregnancy, unspecified trimester  O09.90       2. History of molar pregnancy  Z87.59       3. Anemia during pregnancy in third trimester  O99.013         Ultrasound reviewed - in particular AC percentile.      Pia Tong MD, FAAFP

## 2023-12-12 ENCOUNTER — TELEPHONE (OUTPATIENT)
Dept: NURSING | Facility: CLINIC | Age: 33
End: 2023-12-12

## 2023-12-12 ENCOUNTER — PRENATAL OFFICE VISIT (OUTPATIENT)
Dept: FAMILY MEDICINE | Facility: OTHER | Age: 33
End: 2023-12-12
Attending: FAMILY MEDICINE
Payer: COMMERCIAL

## 2023-12-12 VITALS
OXYGEN SATURATION: 97 % | SYSTOLIC BLOOD PRESSURE: 116 MMHG | WEIGHT: 199.8 LBS | HEIGHT: 65 IN | TEMPERATURE: 97.7 F | DIASTOLIC BLOOD PRESSURE: 70 MMHG | HEART RATE: 102 BPM | RESPIRATION RATE: 16 BRPM | BODY MASS INDEX: 33.29 KG/M2

## 2023-12-12 DIAGNOSIS — Z87.59 HISTORY OF MOLAR PREGNANCY: ICD-10-CM

## 2023-12-12 DIAGNOSIS — Z20.828 RSV EXPOSURE: ICD-10-CM

## 2023-12-12 DIAGNOSIS — O09.90 HIGH-RISK PREGNANCY, UNSPECIFIED TRIMESTER: Primary | ICD-10-CM

## 2023-12-12 DIAGNOSIS — O99.013 ANEMIA DURING PREGNANCY IN THIRD TRIMESTER: ICD-10-CM

## 2023-12-12 LAB
FLUAV RNA SPEC QL NAA+PROBE: NEGATIVE
FLUBV RNA RESP QL NAA+PROBE: NEGATIVE
RSV RNA SPEC NAA+PROBE: NEGATIVE
SARS-COV-2 RNA RESP QL NAA+PROBE: POSITIVE

## 2023-12-12 PROCEDURE — 99207 PR OB VISIT-NO CHARGE - GICH ONLY: CPT | Performed by: FAMILY MEDICINE

## 2023-12-12 PROCEDURE — 87637 SARSCOV2&INF A&B&RSV AMP PRB: CPT | Mod: ZL | Performed by: FAMILY MEDICINE

## 2023-12-12 PROCEDURE — C9803 HOPD COVID-19 SPEC COLLECT: HCPCS | Performed by: FAMILY MEDICINE

## 2023-12-12 ASSESSMENT — PAIN SCALES - GENERAL: PAINLEVEL: NO PAIN (0)

## 2023-12-12 NOTE — NURSING NOTE
"Chief Complaint   Patient presents with    Prenatal Care     OB check - 33 weeks, 6 days       Initial /70 (BP Location: Right arm, Patient Position: Sitting, Cuff Size: Adult Regular)   Pulse 102   Temp 97.7  F (36.5  C) (Temporal)   Resp 16   Ht 1.651 m (5' 5\")   Wt 90.6 kg (199 lb 12.8 oz)   LMP 04/10/2023   SpO2 97%   BMI 33.25 kg/m   Estimated body mass index is 33.25 kg/m  as calculated from the following:    Height as of this encounter: 1.651 m (5' 5\").    Weight as of this encounter: 90.6 kg (199 lb 12.8 oz).    Medication Review: complete    The next two questions are to help us understand your food security.  If you are feeling you need any assistance in this area, we have resources available to support you today.          11/28/2023   SDOH- Food Insecurity   Within the past 12 months, did you worry that your food would run out before you got money to buy more? N   Within the past 12 months, did the food you bought just not last and you didn t have money to get more? N         Health Care Directive:  Patient does not have a Health Care Directive or Living Will: Patient states has Advance Directive and will bring in a copy to clinic.    Basia Holland LPN      "

## 2023-12-12 NOTE — TELEPHONE ENCOUNTER
Coronavirus (COVID-19) Notification    Caller Name (Patient, parent, daughter/son, grandparent, etc)  patient    Reason for call  Notify of Positive Coronavirus (COVID-19) lab results, assess symptoms,  review [facility] recommendations    Lab Result    Lab test:  2019-nCoV rRt-PCR or SARS-CoV-2 PCR    Oropharyngeal AND/OR nasopharyngeal swabs is POSITIVE for 2019-nCoV RNA/SARS-COV-2 PCR (COVID-19 virus)      Gather patient reported symptoms   Assessment   Current Symptoms at time of phone call, reported by patient: (if no symptoms, document: No symptoms] Cough, runny nose   Date of symptom(s) onset (if applicable) 12/6     If at time of call, Patients symptoms have worsened, the Patient should contact 911 or have someone drive them to Emergency Dept promptly:    If Patient calling 911, inform 911 personal that you have tested positive for the Coronavirus (COVID-19).  Place mask on and await 911 to arrive.  If Emergency Dept, If possible, please have another adult drive you to the Emergency Dept but you need to wear mask when in contact with other people.      Treatment Options:   Is patient interested in discussing COVID treatment? No.        Review information with Patient    Your result was positive. This means you have COVID-19 (coronavirus).    How can I protect others?    These guidelines are for isolating before returning to work, school or .    If you DO have symptoms  Stay home and away from others   For at least 5 days after your symptoms started, AND  You are fever free for 24 hours (with no medicine that reduces fever), AND  Your other symptoms are better  Wear a mask for 10 full days anytime you are around others    If you DON'T have symptoms  Stay home and away from others for at least 5 days after your positive test  Wear a mask for 10 full days anytime you are around others    There may be different guidelines for healthcare facilities.  Please check with the specific sites before  arriving.    If you have been told by a doctor that you were severely ill with COVID-19 or are immunocompromised, you should isolate for at least 10 days.    You should not go back to work until you meet the guidelines above for ending your home isolation. You don't need to be retested for COVID-19 before going back to work--studies show that you won't spread the virus if it's been at least 10 days since your symptoms started (or 20 days, if you have a weak immune system).    Employers, schools, and daycares: This is an official notice for this person's medical guidelines for returning in-person.  They must meet the above guidelines before going back to work, school or  in person.    You will receive a positive COVID-19 letter via [employer] or the mail soon with additional self-care information.    Would you like me to review some of that information with you now?  No    If you were tested for an upcoming procedure, please contact your provider for next steps.    Coretta Vega

## 2023-12-12 NOTE — TELEPHONE ENCOUNTER
Patient classified as COVID treatment eligible by Epic high risk algorithm:  No    Coronavirus (COVID-19) Notification    Reason for call  Notify of POSITIVE COVID-19 lab result, assess symptoms,  review [facility] recommendations    Lab Result   Lab test for 2019-nCoV rRt-PCR or SARS-COV-2 PCR  Oropharyngeal AND/OR nasopharyngeal swabs were POSITIVE for 2019-nCoV RNA [OR] SARS-COV-2 RNA (COVID-19) RNA     We have been unable to reach patient by phone at this time to notify of their Positive COVID-19 result.    Left voicemail message requesting a call back to [phone] [facility] for results.        A Positive COVID-19 letter will be sent via [employer] or the mail.    CHAIM DIAL

## 2023-12-12 NOTE — PROGRESS NOTES
"Nursing Notes:   Basia Holland LPN  2023  8:54 AM  Signed  Chief Complaint   Patient presents with    Prenatal Care     OB check - 33 weeks, 6 days       Initial /70 (BP Location: Right arm, Patient Position: Sitting, Cuff Size: Adult Regular)   Pulse 102   Temp 97.7  F (36.5  C) (Temporal)   Resp 16   Ht 1.651 m (5' 5\")   Wt 90.6 kg (199 lb 12.8 oz)   LMP 04/10/2023   SpO2 97%   BMI 33.25 kg/m   Estimated body mass index is 33.25 kg/m  as calculated from the following:    Height as of this encounter: 1.651 m (5' 5\").    Weight as of this encounter: 90.6 kg (199 lb 12.8 oz).    Medication Review: complete    The next two questions are to help us understand your food security.  If you are feeling you need any assistance in this area, we have resources available to support you today.          2023   SDOH- Food Insecurity   Within the past 12 months, did you worry that your food would run out before you got money to buy more? N   Within the past 12 months, did the food you bought just not last and you didn t have money to get more? N         Health Care Directive:  Patient does not have a Health Care Directive or Living Will: Patient states has Advance Directive and will bring in a copy to clinic.    Basia Holland LPN                   LMP 04/10/2023     Jenny Hadley is a 32 year old female   at  33w6d       Recent RSV exposure. - she is having symptoms x5 days, some cough and congestion, no fever and + pink eye symptoms     1. Reviewed normal physiologic changes in mom, self cares.  2.  Discussed current fetal development.    Lab Results   Component Value Date    ABO A 2021    RH Pos 2021    [id number] A Rh Positive 2016     Hemoglobin   Date Value Ref Range Status   10/23/2023 10.9 (L) 11.7 - 15.7 g/dL Final   2023 12.2 11.7 - 15.7 g/dL Final   2021 13.0 11.7 - 15.7 g/dL Final   2019 10.4 (L) 11.7 - 15.7 g/dL Final             -- Datin " week US RAVI: is  not with LMP EDC ; EDC is 1/24/24  -- PNLs: collected               CBC              RPR              Hep B S Ag              Hep C              Rubella              HIV              ABO/Rh type A POS                Antibody Screen    UA/UC   GONORRHEA/CT  -- Genetic Screening: Discussed with patient, she has decided to maybe do quad instead of  NIPT and carrier screening  -- Anatomy US: Planned for approximately 20 weeks gestation. Completed - EFW 88% and was rechecked 11/28  -- Immunizations: Plans for influenza:  discussed 9/26/2023, likely at some point   Covid completed x2  Tdap 10/24/2023   Flu 10/24/2023   -- 3rd TM labs including CBC, RPR: completed, hemoglobin low and iron started   -- 1 hr GTT: within normal limits   -- GBS: Planned for 36 weeks - history of positive   -- Postpartum Planning/PPBC: condoms and episodic abstinence    -- Delivery Planning: routine care   -- Return to clinic in 2 weeks for OB follow up visit  -- Planning to do at next visit:  if RSV is positive, she would not need the vaccine ; GBS at 36 weeks  Follow up ultrasound if gets to 37 weeks    Placenta to pathology at delivery        ICD-10-CM    1. High-risk pregnancy, unspecified trimester  O09.90       2. History of molar pregnancy  Z87.59       3. Anemia during pregnancy in third trimester  O99.013           Exam Date Exam Time Accession # Performing Department Results    11/28/23  9:36 AM [id number] [facility]      PACS Images     Show images for US OB >14 Weeks Follow Up     Study Result    Narrative & Impression   OB ULTRASOUND - Transabdominal      Clinical: EFW, shashi; High-risk pregnancy, unspecified trimester.   Gestation: 1  Comparison: 9/5/2023  Presentation: Cephalic  Cardiac Activity: Regular at 140 bpm  Movement: Yes  Placenta: Anterior ndGndrndanddndend:nd nd2nd Amniotic Fluid: Normal SHASHI: 9.2 cm     Measurements (Hadlock):  BPD: 32%  HC: 40%  AC: 93%  FL: 76%     Estimated Fetal  Weight: 2182 grams  HC/AC: 0.99  US age (current): 33 weeks 1 days  Gestational age: 31 weeks 6 days  US EDC (preferred):  1/24/24   %WT for EGA (preferred dating): 86 %                                                                      IMPRESSION: Single viable intrauterine pregnancy demonstrating an  estimated fetal weight at the 86th percentile for EGA.     MD Pia HARRIS MD, FAAFP

## 2023-12-15 ENCOUNTER — ALLIED HEALTH/NURSE VISIT (OUTPATIENT)
Dept: FAMILY MEDICINE | Facility: OTHER | Age: 33
End: 2023-12-15
Attending: FAMILY MEDICINE
Payer: COMMERCIAL

## 2023-12-15 DIAGNOSIS — Z23 NEED FOR VACCINATION: Primary | ICD-10-CM

## 2023-12-15 PROCEDURE — 90471 IMMUNIZATION ADMIN: CPT

## 2023-12-15 PROCEDURE — 90678 RSV VACC PREF BIVALENT IM: CPT

## 2023-12-15 NOTE — NURSING NOTE
Chief Complaint   Patient presents with    Immunization     RSV vaccination    Basia Holland LPN on 12/15/2023 at 11:44 AM

## 2023-12-22 NOTE — PROGRESS NOTES
Routine OB Visit    S: Starting to feel ready to be done.      + FM.  No bleeding, discharge, fluid leaking.    O: /62   Pulse 94   Temp 98.1  F (36.7  C) (Temporal)   Resp 16   Wt 91.2 kg (201 lb)   LMP 04/10/2023   SpO2 96%   Breastfeeding No   BMI 33.45 kg/m    Gen: Well-appearing, NAD  FH: 36  FHR: 140  Cx: 3.5/70/-2; BOW palpable in addition to fetal head/sutures.  Ext: No edema  Skin: No rash    A/P:  Jenny Hadley is a 33 year old  at 35w6d by FTUS, here for return OB visit.    # Hx of molar pregnancy   --send placenta at time of delivery    # Hx of GBS +   --collect today    # Covid + at last OB check   --has US scheduled for 24    # Anemia of pregnancy   -- on iron    PNC: - Prenatal labs normal, Rh +, Rubella positive/immune, , pass  Genetics: not completed  Imaging: last US 23 with EFW 86%.  Has follow up scheduled 24.  Immunizations: Covid completed x2; Tdap 10/24/2023;   Flu 10/24/2023   RTC weekly    Ginny Matias DO  Family Practice

## 2023-12-26 ENCOUNTER — PRENATAL OFFICE VISIT (OUTPATIENT)
Dept: FAMILY MEDICINE | Facility: OTHER | Age: 33
End: 2023-12-26
Attending: FAMILY MEDICINE
Payer: COMMERCIAL

## 2023-12-26 VITALS
OXYGEN SATURATION: 96 % | DIASTOLIC BLOOD PRESSURE: 62 MMHG | SYSTOLIC BLOOD PRESSURE: 122 MMHG | BODY MASS INDEX: 33.45 KG/M2 | TEMPERATURE: 98.1 F | HEART RATE: 94 BPM | WEIGHT: 201 LBS | RESPIRATION RATE: 16 BRPM

## 2023-12-26 DIAGNOSIS — O09.90 HIGH-RISK PREGNANCY, UNSPECIFIED TRIMESTER: Primary | ICD-10-CM

## 2023-12-26 DIAGNOSIS — O99.013 ANEMIA DURING PREGNANCY IN THIRD TRIMESTER: ICD-10-CM

## 2023-12-26 DIAGNOSIS — Z87.59 HISTORY OF MOLAR PREGNANCY: ICD-10-CM

## 2023-12-26 PROCEDURE — 99207 PR OB VISIT-NO CHARGE - GICH ONLY: CPT | Performed by: FAMILY MEDICINE

## 2023-12-26 PROCEDURE — 87653 STREP B DNA AMP PROBE: CPT | Mod: ZL | Performed by: FAMILY MEDICINE

## 2023-12-26 RX ORDER — DOCUSATE SODIUM 100 MG/1
100 CAPSULE, LIQUID FILLED ORAL EVERY OTHER DAY
COMMUNITY

## 2023-12-26 ASSESSMENT — PAIN SCALES - GENERAL: PAINLEVEL: NO PAIN (0)

## 2023-12-26 NOTE — NURSING NOTE
"Chief Complaint   Patient presents with    Prenatal Care     35.6 weeks       Initial /62   Pulse 94   Temp 98.1  F (36.7  C) (Temporal)   Resp 16   Wt 91.2 kg (201 lb)   LMP 04/10/2023   SpO2 96%   Breastfeeding No   BMI 33.45 kg/m   Estimated body mass index is 33.45 kg/m  as calculated from the following:    Height as of 12/12/23: 1.651 m (5' 5\").    Weight as of this encounter: 91.2 kg (201 lb).  Medication Review: complete    The next two questions are to help us understand your food security.  If you are feeling you need any assistance in this area, we have resources available to support you today.          12/26/2023   SDOH- Food Insecurity   Within the past 12 months, did you worry that your food would run out before you got money to buy more? N   Within the past 12 months, did the food you bought just not last and you didn t have money to get more? N         Health Care Directive:  Patient does not have a Health Care Directive or Living Will: Discussed advance care planning with patient; however, patient declined at this time.    Valerie Rico LPN      "

## 2023-12-27 LAB — GP B STREP DNA SPEC QL NAA+PROBE: NEGATIVE

## 2024-01-02 ENCOUNTER — MYC MEDICAL ADVICE (OUTPATIENT)
Dept: FAMILY MEDICINE | Facility: OTHER | Age: 34
End: 2024-01-02
Payer: COMMERCIAL

## 2024-01-02 ENCOUNTER — HOSPITAL ENCOUNTER (OUTPATIENT)
Dept: ULTRASOUND IMAGING | Facility: OTHER | Age: 34
Discharge: HOME | End: 2024-01-02
Attending: FAMILY MEDICINE | Admitting: FAMILY MEDICINE
Payer: COMMERCIAL

## 2024-01-02 DIAGNOSIS — O09.90 HIGH-RISK PREGNANCY, UNSPECIFIED TRIMESTER: ICD-10-CM

## 2024-01-02 PROCEDURE — 76816 OB US FOLLOW-UP PER FETUS: CPT

## 2024-01-04 ENCOUNTER — MYC MEDICAL ADVICE (OUTPATIENT)
Dept: FAMILY MEDICINE | Facility: OTHER | Age: 34
End: 2024-01-04
Payer: COMMERCIAL

## 2024-01-09 ENCOUNTER — PRENATAL OFFICE VISIT (OUTPATIENT)
Dept: FAMILY MEDICINE | Facility: OTHER | Age: 34
End: 2024-01-09
Attending: FAMILY MEDICINE
Payer: COMMERCIAL

## 2024-01-09 VITALS
DIASTOLIC BLOOD PRESSURE: 68 MMHG | HEART RATE: 94 BPM | SYSTOLIC BLOOD PRESSURE: 122 MMHG | OXYGEN SATURATION: 98 % | RESPIRATION RATE: 14 BRPM | BODY MASS INDEX: 33.82 KG/M2 | TEMPERATURE: 97.8 F | WEIGHT: 203 LBS | HEIGHT: 65 IN

## 2024-01-09 DIAGNOSIS — O36.63X0 EXCESSIVE FETAL GROWTH AFFECTING MANAGEMENT OF PREGNANCY IN THIRD TRIMESTER, SINGLE OR UNSPECIFIED FETUS: ICD-10-CM

## 2024-01-09 DIAGNOSIS — O09.90 HIGH-RISK PREGNANCY, UNSPECIFIED TRIMESTER: Primary | ICD-10-CM

## 2024-01-09 DIAGNOSIS — Z87.59 HISTORY OF MOLAR PREGNANCY: ICD-10-CM

## 2024-01-09 DIAGNOSIS — O99.013 ANEMIA DURING PREGNANCY IN THIRD TRIMESTER: ICD-10-CM

## 2024-01-09 PROCEDURE — 99207 PR OB VISIT-NO CHARGE - GICH ONLY: CPT | Performed by: FAMILY MEDICINE

## 2024-01-09 RX ORDER — TRANEXAMIC ACID 10 MG/ML
1 INJECTION, SOLUTION INTRAVENOUS EVERY 30 MIN PRN
Status: CANCELLED | OUTPATIENT
Start: 2024-01-09

## 2024-01-09 RX ORDER — METOCLOPRAMIDE 10 MG/1
10 TABLET ORAL EVERY 6 HOURS PRN
Status: CANCELLED | OUTPATIENT
Start: 2024-01-09

## 2024-01-09 RX ORDER — SODIUM CHLORIDE, SODIUM LACTATE, POTASSIUM CHLORIDE, CALCIUM CHLORIDE 600; 310; 30; 20 MG/100ML; MG/100ML; MG/100ML; MG/100ML
INJECTION, SOLUTION INTRAVENOUS CONTINUOUS PRN
Status: CANCELLED | OUTPATIENT
Start: 2024-01-09

## 2024-01-09 RX ORDER — OXYTOCIN/0.9 % SODIUM CHLORIDE 30/500 ML
100-340 PLASTIC BAG, INJECTION (ML) INTRAVENOUS CONTINUOUS PRN
Status: CANCELLED | OUTPATIENT
Start: 2024-01-09

## 2024-01-09 RX ORDER — OXYTOCIN/0.9 % SODIUM CHLORIDE 30/500 ML
340 PLASTIC BAG, INJECTION (ML) INTRAVENOUS CONTINUOUS PRN
Status: CANCELLED | OUTPATIENT
Start: 2024-01-09

## 2024-01-09 RX ORDER — NALOXONE HYDROCHLORIDE 0.4 MG/ML
0.4 INJECTION, SOLUTION INTRAMUSCULAR; INTRAVENOUS; SUBCUTANEOUS
Status: CANCELLED | OUTPATIENT
Start: 2024-01-09

## 2024-01-09 RX ORDER — METOCLOPRAMIDE HYDROCHLORIDE 5 MG/ML
10 INJECTION INTRAMUSCULAR; INTRAVENOUS EVERY 6 HOURS PRN
Status: CANCELLED | OUTPATIENT
Start: 2024-01-09

## 2024-01-09 RX ORDER — ONDANSETRON 2 MG/ML
4 INJECTION INTRAMUSCULAR; INTRAVENOUS EVERY 6 HOURS PRN
Status: CANCELLED | OUTPATIENT
Start: 2024-01-09

## 2024-01-09 RX ORDER — KETOROLAC TROMETHAMINE 30 MG/ML
30 INJECTION, SOLUTION INTRAMUSCULAR; INTRAVENOUS
Status: CANCELLED | OUTPATIENT
Start: 2024-01-09 | End: 2024-01-14

## 2024-01-09 RX ORDER — METHYLERGONOVINE MALEATE 0.2 MG/ML
200 INJECTION INTRAVENOUS
Status: CANCELLED | OUTPATIENT
Start: 2024-01-09

## 2024-01-09 RX ORDER — IBUPROFEN 400 MG/1
800 TABLET, FILM COATED ORAL
Status: CANCELLED | OUTPATIENT
Start: 2024-01-09 | End: 2024-01-14

## 2024-01-09 RX ORDER — NALOXONE HYDROCHLORIDE 0.4 MG/ML
0.2 INJECTION, SOLUTION INTRAMUSCULAR; INTRAVENOUS; SUBCUTANEOUS
Status: CANCELLED | OUTPATIENT
Start: 2024-01-09

## 2024-01-09 RX ORDER — PROCHLORPERAZINE MALEATE 10 MG
10 TABLET ORAL EVERY 6 HOURS PRN
Status: CANCELLED | OUTPATIENT
Start: 2024-01-09

## 2024-01-09 RX ORDER — OXYTOCIN 10 [USP'U]/ML
10 INJECTION, SOLUTION INTRAMUSCULAR; INTRAVENOUS
Status: CANCELLED | OUTPATIENT
Start: 2024-01-09

## 2024-01-09 RX ORDER — PROCHLORPERAZINE 25 MG
25 SUPPOSITORY, RECTAL RECTAL EVERY 12 HOURS PRN
Status: CANCELLED | OUTPATIENT
Start: 2024-01-09

## 2024-01-09 RX ORDER — ONDANSETRON 4 MG/1
4 TABLET, ORALLY DISINTEGRATING ORAL EVERY 6 HOURS PRN
Status: CANCELLED | OUTPATIENT
Start: 2024-01-09

## 2024-01-09 RX ORDER — OXYTOCIN/0.9 % SODIUM CHLORIDE 30/500 ML
1-24 PLASTIC BAG, INJECTION (ML) INTRAVENOUS CONTINUOUS
Status: CANCELLED | OUTPATIENT
Start: 2024-01-09

## 2024-01-09 RX ORDER — MISOPROSTOL 100 UG/1
400 TABLET ORAL
Status: CANCELLED | OUTPATIENT
Start: 2024-01-09

## 2024-01-09 RX ORDER — LIDOCAINE 40 MG/G
CREAM TOPICAL
Status: CANCELLED | OUTPATIENT
Start: 2024-01-09

## 2024-01-09 RX ORDER — CITRIC ACID/SODIUM CITRATE 334-500MG
30 SOLUTION, ORAL ORAL
Status: CANCELLED | OUTPATIENT
Start: 2024-01-09

## 2024-01-09 RX ORDER — CARBOPROST TROMETHAMINE 250 UG/ML
250 INJECTION, SOLUTION INTRAMUSCULAR
Status: CANCELLED | OUTPATIENT
Start: 2024-01-09

## 2024-01-09 ASSESSMENT — PAIN SCALES - GENERAL: PAINLEVEL: NO PAIN (0)

## 2024-01-09 NOTE — PATIENT INSTRUCTIONS
Induction:  scheduled January 17th, 0530.    Call [phone] before you come in.    Eat before you come.  Come in through the ED.

## 2024-01-09 NOTE — NURSING NOTE
"Chief Complaint   Patient presents with    Prenatal Care     OB check - 37 weeks, 6 days       Initial /68 (BP Location: Right arm, Patient Position: Sitting, Cuff Size: Adult Regular)   Pulse 94   Temp 97.8  F (36.6  C) (Temporal)   Resp 14   Ht 1.651 m (5' 5\")   Wt 92.1 kg (203 lb)   LMP 04/10/2023   SpO2 98%   BMI 33.78 kg/m   Estimated body mass index is 33.78 kg/m  as calculated from the following:    Height as of this encounter: 1.651 m (5' 5\").    Weight as of this encounter: 92.1 kg (203 lb).  Medication Review: complete    The next two questions are to help us understand your food security.  If you are feeling you need any assistance in this area, we have resources available to support you today.          12/26/2023   SDOH- Food Insecurity   Within the past 12 months, did you worry that your food would run out before you got money to buy more? N   Within the past 12 months, did the food you bought just not last and you didn t have money to get more? N         Health Care Directive:  Patient does not have a Health Care Directive or Living Will: Discussed advance care planning with patient; however, patient declined at this time.    Basia Holland LPN      "

## 2024-01-09 NOTE — PROGRESS NOTES
"Nursing Notes:   Basia Holland LPN  2024  8:59 AM  Signed  Chief Complaint   Patient presents with    Prenatal Care     OB check - 37 weeks, 6 days       Initial /68 (BP Location: Right arm, Patient Position: Sitting, Cuff Size: Adult Regular)   Pulse 94   Temp 97.8  F (36.6  C) (Temporal)   Resp 14   Ht 1.651 m (5' 5\")   Wt 92.1 kg (203 lb)   LMP 04/10/2023   SpO2 98%   BMI 33.78 kg/m   Estimated body mass index is 33.78 kg/m  as calculated from the following:    Height as of this encounter: 1.651 m (5' 5\").    Weight as of this encounter: 92.1 kg (203 lb).  Medication Review: complete    The next two questions are to help us understand your food security.  If you are feeling you need any assistance in this area, we have resources available to support you today.          2023   SDOH- Food Insecurity   Within the past 12 months, did you worry that your food would run out before you got money to buy more? N   Within the past 12 months, did the food you bought just not last and you didn t have money to get more? N         Health Care Directive:  Patient does not have a Health Care Directive or Living Will: Discussed advance care planning with patient; however, patient declined at this time.    Basia Hloland LPN               LMP 04/10/2023     Jenny Hadley is a 32 year old female   at  37w6d     Narrative & Impression   OB ULTRASOUND - Transabdominal      Clinical: High-risk pregnancy, unspecified trimester.   Gestation: 1  Comparison: 2023  Presentation: Cephalic  Cardiac Activity: Regular at 144 bpm  Movement: Yes  Placenta: Anterior ndGndrndanddndend:nd nd2nd Amniotic Fluid: Normal SHASHI: 12 cm     Measurements (Hadlock):  BPD: 72%  HC: 42%  AC: >98%  FL: 56%     Estimated Fetal Weight: 3597 grams  HC/AC: 0.91  US age (current): 38 weeks 1 days  Gestational age: 36 weeks 6 days  US EDC (preferred):  24   %WT for EGA (preferred dating): 94 %     The left renal pelvis is upper " normal in caliber at 7 mm.                                                                      IMPRESSION: Single viable intrauterine pregnancy demonstrating an  estimated fetal weight at the 94th percentile for EGA.     Borderline left fetal pyelectasis.     MALIK QUEEN MD        Recent COVID infection  - symptoms resolved     1. Reviewed normal physiologic changes in mom, self cares.  2.  Discussed current fetal development.    Lab Results   Component Value Date    ABO A 2021    RH Pos 2021    [id number] A Rh Positive 2016     Hemoglobin   Date Value Ref Range Status   10/23/2023 10.9 (L) 11.7 - 15.7 g/dL Final   2023 12.2 11.7 - 15.7 g/dL Final   2021 13.0 11.7 - 15.7 g/dL Final   2019 10.4 (L) 11.7 - 15.7 g/dL Final             -- Datin week US RAVI: is  not with LMP EDC ; EDC is 24  -- PNLs: collected               CBC              RPR              Hep B S Ag              Hep C              Rubella              HIV              ABO/Rh type A POS                Antibody Screen    UA/UC   GONORRHEA/CT  -- Genetic Screening: Discussed with patient, she has decided to maybe do quad instead of  NIPT and carrier screening  -- Anatomy US: Planned for approximately 20 weeks gestation. Completed - EFW 88% and was rechecked   -- Immunizations: Plans for influenza:  discussed 2023, likely at some point   Covid completed x2  Tdap 10/24/2023   Flu 10/24/2023   -- 3rd TM labs including CBC, RPR: completed, hemoglobin low and iron started   -- 1 hr GTT: within normal limits   -- GBS: Planned for 36 weeks - history of positive   -- Postpartum Planning/PPBC: condoms and episodic abstinence   -- Planning to do at next visit:  if RSV is positive, she would not need the vaccine ; GBS at 36 weeks was negative, but she's been positive in the past  - she declines antibiotics     Placenta to pathology at delivery    Discussed with pt indication for induction of labor -  LGA.Discussed monitoring (continuos).  Discussed risks including tachysystole and fetal distress.  Discussed increased risk/need for  with labor induction indication in general.  Pt voices good understanding and wishes to proceed.  Induction scheduled 24.  Planned medication:  pitocin           ICD-10-CM    1. High-risk pregnancy, unspecified trimester  O09.90       2. History of molar pregnancy  Z87.59       3. Anemia during pregnancy in third trimester  O99.013       4. Excessive fetal growth affecting management of pregnancy in third trimester, single or unspecified fetus  O36.63X0           Exam Date Exam Time Accession # Performing Department Results    23  9:36 AM [id number] [facility]      PACS Images     Show images for US OB >14 Weeks Follow Up     Study Result    Narrative & Impression   OB ULTRASOUND - Transabdominal      Clinical: EFW, shashi; High-risk pregnancy, unspecified trimester.   Gestation: 1  Comparison: 2023  Presentation: Cephalic  Cardiac Activity: Regular at 140 bpm  Movement: Yes  Placenta: Anterior ndGndrndanddndend:nd nd2nd Amniotic Fluid: Normal SHASHI: 9.2 cm     Measurements (Hadlock):  BPD: 32%  HC: 40%  AC: 93%  FL: 76%     Estimated Fetal Weight: 2182 grams  HC/AC: 0.99  US age (current): 33 weeks 1 days  Gestational age: 31 weeks 6 days  US EDC (preferred):  24   %WT for EGA (preferred dating): 86 %                                                                      IMPRESSION: Single viable intrauterine pregnancy demonstrating an  estimated fetal weight at the 86th percentile for EGA.     MD Pia HARRIS MD, FAAFP

## 2024-01-16 ENCOUNTER — PRENATAL OFFICE VISIT (OUTPATIENT)
Dept: FAMILY MEDICINE | Facility: OTHER | Age: 34
End: 2024-01-16
Attending: FAMILY MEDICINE
Payer: COMMERCIAL

## 2024-01-16 VITALS
WEIGHT: 206 LBS | BODY MASS INDEX: 34.32 KG/M2 | OXYGEN SATURATION: 97 % | DIASTOLIC BLOOD PRESSURE: 70 MMHG | RESPIRATION RATE: 14 BRPM | SYSTOLIC BLOOD PRESSURE: 122 MMHG | TEMPERATURE: 97.6 F | HEIGHT: 65 IN | HEART RATE: 98 BPM

## 2024-01-16 DIAGNOSIS — O99.013 ANEMIA DURING PREGNANCY IN THIRD TRIMESTER: ICD-10-CM

## 2024-01-16 DIAGNOSIS — Z87.59 HISTORY OF MOLAR PREGNANCY: ICD-10-CM

## 2024-01-16 DIAGNOSIS — O09.90 HIGH-RISK PREGNANCY, UNSPECIFIED TRIMESTER: Primary | ICD-10-CM

## 2024-01-16 DIAGNOSIS — O36.63X0 EXCESSIVE FETAL GROWTH AFFECTING MANAGEMENT OF PREGNANCY IN THIRD TRIMESTER, SINGLE OR UNSPECIFIED FETUS: ICD-10-CM

## 2024-01-16 PROCEDURE — 99207 PR OB VISIT-NO CHARGE - GICH ONLY: CPT | Performed by: FAMILY MEDICINE

## 2024-01-16 ASSESSMENT — PAIN SCALES - GENERAL: PAINLEVEL: NO PAIN (0)

## 2024-01-16 NOTE — NURSING NOTE
"Chief Complaint   Patient presents with    Prenatal Care     OB check - 38 weeks, 6 days       Initial /70 (BP Location: Right arm, Patient Position: Sitting, Cuff Size: Adult Regular)   Pulse 98   Temp 97.6  F (36.4  C) (Temporal)   Resp 14   Ht 1.651 m (5' 5\")   Wt 93.4 kg (206 lb)   LMP 04/10/2023   SpO2 97%   BMI 34.28 kg/m   Estimated body mass index is 34.28 kg/m  as calculated from the following:    Height as of this encounter: 1.651 m (5' 5\").    Weight as of this encounter: 93.4 kg (206 lb).    Medication Review: complete    The next two questions are to help us understand your food security.  If you are feeling you need any assistance in this area, we have resources available to support you today.          12/26/2023   SDOH- Food Insecurity   Within the past 12 months, did you worry that your food would run out before you got money to buy more? N   Within the past 12 months, did the food you bought just not last and you didn t have money to get more? N     Health Care Directive:  Patient does not have a Health Care Directive or Living Will: Discussed advance care planning with patient; however, patient declined at this time.    Basia Holland LPN      "

## 2024-01-16 NOTE — PROGRESS NOTES
"Nursing Notes:   Basia Holland LPN  2024  9:06 AM  Signed  Chief Complaint   Patient presents with    Prenatal Care     OB check - 38 weeks, 6 days       Initial /70 (BP Location: Right arm, Patient Position: Sitting, Cuff Size: Adult Regular)   Pulse 98   Temp 97.6  F (36.4  C) (Temporal)   Resp 14   Ht 1.651 m (5' 5\")   Wt 93.4 kg (206 lb)   LMP 04/10/2023   SpO2 97%   BMI 34.28 kg/m   Estimated body mass index is 34.28 kg/m  as calculated from the following:    Height as of this encounter: 1.651 m (5' 5\").    Weight as of this encounter: 93.4 kg (206 lb).    Medication Review: complete    The next two questions are to help us understand your food security.  If you are feeling you need any assistance in this area, we have resources available to support you today.          2023   SDOH- Food Insecurity   Within the past 12 months, did you worry that your food would run out before you got money to buy more? N   Within the past 12 months, did the food you bought just not last and you didn t have money to get more? N     Health Care Directive:  Patient does not have a Health Care Directive or Living Will: Discussed advance care planning with patient; however, patient declined at this time.    Basia Holland LPN           /70 (BP Location: Right arm, Patient Position: Sitting, Cuff Size: Adult Regular)   Pulse 98   Temp 97.6  F (36.4  C) (Temporal)   Resp 14   Ht 1.651 m (5' 5\")   Wt 93.4 kg (206 lb)   LMP 04/10/2023   SpO2 97%   BMI 34.28 kg/m      Jenny Hadley is a 32 year old female   at 38w6d     She's had some more mucousy discharge, but not much cramping or contractions.     Narrative & Impression   OB ULTRASOUND - Transabdominal      Clinical: High-risk pregnancy, unspecified trimester.   Gestation: 1  Comparison: 2023  Presentation: Cephalic  Cardiac Activity: Regular at 144 bpm  Movement: Yes  Placenta: Anterior ndGndrndanddndend:nd nd2nd Amniotic Fluid: Normal " SHASHI: 12 cm     Measurements (Hadlock):  BPD: 72%  HC: 42%  AC: >98%  FL: 56%     Estimated Fetal Weight: 3597 grams  HC/AC: 0.91  US age (current): 38 weeks 1 days  Gestational age: 36 weeks 6 days  US EDC (preferred):  24   %WT for EGA (preferred dating): 94 %     The left renal pelvis is upper normal in caliber at 7 mm.                                                                      IMPRESSION: Single viable intrauterine pregnancy demonstrating an  estimated fetal weight at the 94th percentile for EGA.     Borderline left fetal pyelectasis.     MALIK QUEEN MD        Recent COVID infection  - symptoms resolved     1. Reviewed normal physiologic changes in mom, self cares.  2.  Discussed current fetal development.    Lab Results   Component Value Date    ABO A 2021    RH Pos 2021    [id number] A Rh Positive 2016     Hemoglobin   Date Value Ref Range Status   10/23/2023 10.9 (L) 11.7 - 15.7 g/dL Final   2023 12.2 11.7 - 15.7 g/dL Final   2021 13.0 11.7 - 15.7 g/dL Final   2019 10.4 (L) 11.7 - 15.7 g/dL Final             -- Datin week US ARVI: is  not with LMP EDC ; EDC is 24  -- PNLs: collected               CBC              RPR              Hep B S Ag              Hep C              Rubella              HIV              ABO/Rh type A POS                Antibody Screen    UA/UC   GONORRHEA/CT  -- Genetic Screening: Discussed with patient, she has decided to maybe do quad instead of  NIPT and carrier screening  -- Anatomy US: Planned for approximately 20 weeks gestation. Completed - EFW 88% and was rechecked   -- Immunizations: Plans for influenza:  discussed 2023, likely at some point   Covid completed x2  Tdap 10/24/2023   Flu 10/24/2023   -- 3rd TM labs including CBC, RPR: completed, hemoglobin low and iron started   -- 1 hr GTT: within normal limits   -- GBS: Planned for 36 weeks - history of positive   -- Postpartum Planning/PPBC: condoms and  episodic abstinence   -- Planning to do at next visit:  if RSV is positive, she would not need the vaccine ; GBS at 36 weeks was negative, but she's been positive in the past  - she declines antibiotics     Placenta to pathology at delivery    Discussed with pt indication for induction of labor - LGA.Discussed monitoring (continuos).  Discussed risks including tachysystole and fetal distress.  Discussed increased risk/need for  with labor induction indication in general.  Pt voices good understanding and wishes to proceed.  Induction scheduled 24.  Planned medication:  pitocin           ICD-10-CM    1. High-risk pregnancy, unspecified trimester  O09.90       2. History of molar pregnancy  Z87.59       3. Anemia during pregnancy in third trimester  O99.013       4. Excessive fetal growth affecting management of pregnancy in third trimester, single or unspecified fetus  O36.63X0           Exam Date Exam Time Accession # Performing Department Results    23  9:36 AM [id number] [facility]      PACS Images     Show images for US OB >14 Weeks Follow Up     Study Result    Narrative & Impression   OB ULTRASOUND - Transabdominal      Clinical: EFW, shashi; High-risk pregnancy, unspecified trimester.   Gestation: 1  Comparison: 2023  Presentation: Cephalic  Cardiac Activity: Regular at 140 bpm  Movement: Yes  Placenta: Anterior ndGndrndanddndend:nd nd2nd Amniotic Fluid: Normal SHASHI: 9.2 cm     Measurements (Hadlock):  BPD: 32%  HC: 40%  AC: 93%  FL: 76%     Estimated Fetal Weight: 2182 grams  HC/AC: 0.99  US age (current): 33 weeks 1 days  Gestational age: 31 weeks 6 days  US EDC (preferred):  24   %WT for EGA (preferred dating): 86 %                                                                      IMPRESSION: Single viable intrauterine pregnancy demonstrating an  estimated fetal weight at the 86th percentile for EGA.     MD Pia HARRIS MD,  FAAFP

## 2024-01-17 ENCOUNTER — ANESTHESIA (OUTPATIENT)
Dept: OBGYN | Facility: OTHER | Age: 34
End: 2024-01-17
Payer: COMMERCIAL

## 2024-01-17 ENCOUNTER — HOSPITAL ENCOUNTER (INPATIENT)
Facility: OTHER | Age: 34
LOS: 2 days | Discharge: HOME | End: 2024-01-19
Attending: FAMILY MEDICINE | Admitting: FAMILY MEDICINE
Payer: COMMERCIAL

## 2024-01-17 ENCOUNTER — ANESTHESIA EVENT (OUTPATIENT)
Dept: OBGYN | Facility: OTHER | Age: 34
End: 2024-01-17
Payer: COMMERCIAL

## 2024-01-17 DIAGNOSIS — O36.63X0 EXCESSIVE FETAL GROWTH AFFECTING MANAGEMENT OF PREGNANCY IN THIRD TRIMESTER, SINGLE OR UNSPECIFIED FETUS: ICD-10-CM

## 2024-01-17 DIAGNOSIS — Z37.9 NORMAL LABOR: Primary | ICD-10-CM

## 2024-01-17 DIAGNOSIS — O09.90 HIGH-RISK PREGNANCY, UNSPECIFIED TRIMESTER: ICD-10-CM

## 2024-01-17 PROBLEM — Z87.59 HISTORY OF MOLAR PREGNANCY: Status: ACTIVE | Noted: 2019-04-01

## 2024-01-17 PROBLEM — O99.013 ANEMIA DURING PREGNANCY IN THIRD TRIMESTER: Status: ACTIVE | Noted: 2024-01-17

## 2024-01-17 LAB
ABO/RH(D): NORMAL
ANTIBODY SCREEN: NEGATIVE
ERYTHROCYTE [DISTWIDTH] IN BLOOD BY AUTOMATED COUNT: 13.2 % (ref 10–15)
HCT VFR BLD AUTO: 35.4 % (ref 35–47)
HGB BLD-MCNC: 11.5 G/DL (ref 11.7–15.7)
HOLD SPECIMEN: NORMAL
MCH RBC QN AUTO: 27.8 PG (ref 26.5–33)
MCHC RBC AUTO-ENTMCNC: 32.5 G/DL (ref 31.5–36.5)
MCV RBC AUTO: 86 FL (ref 78–100)
PLATELET # BLD AUTO: 292 10E3/UL (ref 150–450)
RBC # BLD AUTO: 4.13 10E6/UL (ref 3.8–5.2)
SPECIMEN EXPIRATION DATE: NORMAL
T PALLIDUM AB SER QL: NONREACTIVE
WBC # BLD AUTO: 11.7 10E3/UL (ref 4–11)

## 2024-01-17 PROCEDURE — 36415 COLL VENOUS BLD VENIPUNCTURE: CPT | Performed by: FAMILY MEDICINE

## 2024-01-17 PROCEDURE — 722N000001 HC LABOR CARE VAGINAL DELIVERY SINGLE

## 2024-01-17 PROCEDURE — 250N000011 HC RX IP 250 OP 636

## 2024-01-17 PROCEDURE — 370N000003 HC ANESTHESIA WARD SERVICE

## 2024-01-17 PROCEDURE — 85014 HEMATOCRIT: CPT | Performed by: FAMILY MEDICINE

## 2024-01-17 PROCEDURE — 59400 OBSTETRICAL CARE: CPT

## 2024-01-17 PROCEDURE — 250N000013 HC RX MED GY IP 250 OP 250 PS 637: Performed by: FAMILY MEDICINE

## 2024-01-17 PROCEDURE — 10907ZC DRAINAGE OF AMNIOTIC FLUID, THERAPEUTIC FROM PRODUCTS OF CONCEPTION, VIA NATURAL OR ARTIFICIAL OPENING: ICD-10-PCS | Performed by: FAMILY MEDICINE

## 2024-01-17 PROCEDURE — 250N000009 HC RX 250

## 2024-01-17 PROCEDURE — 86900 BLOOD TYPING SEROLOGIC ABO: CPT | Performed by: FAMILY MEDICINE

## 2024-01-17 PROCEDURE — 258N000003 HC RX IP 258 OP 636: Performed by: FAMILY MEDICINE

## 2024-01-17 PROCEDURE — 86780 TREPONEMA PALLIDUM: CPT | Performed by: FAMILY MEDICINE

## 2024-01-17 PROCEDURE — 120N000001 HC R&B MED SURG/OB

## 2024-01-17 PROCEDURE — 250N000009 HC RX 250: Performed by: FAMILY MEDICINE

## 2024-01-17 PROCEDURE — 59400 OBSTETRICAL CARE: CPT | Performed by: FAMILY MEDICINE

## 2024-01-17 PROCEDURE — 3E033VJ INTRODUCTION OF OTHER HORMONE INTO PERIPHERAL VEIN, PERCUTANEOUS APPROACH: ICD-10-PCS | Performed by: FAMILY MEDICINE

## 2024-01-17 PROCEDURE — 88307 TISSUE EXAM BY PATHOLOGIST: CPT

## 2024-01-17 RX ORDER — CARBOPROST TROMETHAMINE 250 UG/ML
250 INJECTION, SOLUTION INTRAMUSCULAR
Status: DISCONTINUED | OUTPATIENT
Start: 2024-01-17 | End: 2024-01-17 | Stop reason: HOSPADM

## 2024-01-17 RX ORDER — CITRIC ACID/SODIUM CITRATE 334-500MG
30 SOLUTION, ORAL ORAL ONCE
Status: DISCONTINUED | OUTPATIENT
Start: 2024-01-17 | End: 2024-01-17 | Stop reason: HOSPADM

## 2024-01-17 RX ORDER — MISOPROSTOL 100 UG/1
400 TABLET ORAL
Status: DISCONTINUED | OUTPATIENT
Start: 2024-01-17 | End: 2024-01-19 | Stop reason: HOSPADM

## 2024-01-17 RX ORDER — NALOXONE HYDROCHLORIDE 0.4 MG/ML
0.2 INJECTION, SOLUTION INTRAMUSCULAR; INTRAVENOUS; SUBCUTANEOUS
Status: DISCONTINUED | OUTPATIENT
Start: 2024-01-17 | End: 2024-01-17 | Stop reason: HOSPADM

## 2024-01-17 RX ORDER — MODIFIED LANOLIN
OINTMENT (GRAM) TOPICAL
Status: DISCONTINUED | OUTPATIENT
Start: 2024-01-17 | End: 2024-01-19 | Stop reason: HOSPADM

## 2024-01-17 RX ORDER — KETOROLAC TROMETHAMINE 30 MG/ML
30 INJECTION, SOLUTION INTRAMUSCULAR; INTRAVENOUS
Status: DISCONTINUED | OUTPATIENT
Start: 2024-01-17 | End: 2024-01-19 | Stop reason: HOSPADM

## 2024-01-17 RX ORDER — ONDANSETRON 4 MG/1
4 TABLET, ORALLY DISINTEGRATING ORAL EVERY 6 HOURS PRN
Status: DISCONTINUED | OUTPATIENT
Start: 2024-01-17 | End: 2024-01-17 | Stop reason: HOSPADM

## 2024-01-17 RX ORDER — NALOXONE HYDROCHLORIDE 0.4 MG/ML
0.4 INJECTION, SOLUTION INTRAMUSCULAR; INTRAVENOUS; SUBCUTANEOUS
Status: DISCONTINUED | OUTPATIENT
Start: 2024-01-17 | End: 2024-01-17 | Stop reason: HOSPADM

## 2024-01-17 RX ORDER — OXYTOCIN 10 [USP'U]/ML
10 INJECTION, SOLUTION INTRAMUSCULAR; INTRAVENOUS
Status: DISCONTINUED | OUTPATIENT
Start: 2024-01-17 | End: 2024-01-19 | Stop reason: HOSPADM

## 2024-01-17 RX ORDER — CITRIC ACID/SODIUM CITRATE 334-500MG
30 SOLUTION, ORAL ORAL
Status: COMPLETED | OUTPATIENT
Start: 2024-01-17 | End: 2024-01-17

## 2024-01-17 RX ORDER — TRANEXAMIC ACID 10 MG/ML
1 INJECTION, SOLUTION INTRAVENOUS EVERY 30 MIN PRN
Status: DISCONTINUED | OUTPATIENT
Start: 2024-01-17 | End: 2024-01-19 | Stop reason: HOSPADM

## 2024-01-17 RX ORDER — LOPERAMIDE HCL 2 MG
2 CAPSULE ORAL
Status: DISCONTINUED | OUTPATIENT
Start: 2024-01-17 | End: 2024-01-19 | Stop reason: HOSPADM

## 2024-01-17 RX ORDER — ONDANSETRON 2 MG/ML
4 INJECTION INTRAMUSCULAR; INTRAVENOUS EVERY 6 HOURS PRN
Status: DISCONTINUED | OUTPATIENT
Start: 2024-01-17 | End: 2024-01-17

## 2024-01-17 RX ORDER — ONDANSETRON 4 MG/1
4 TABLET, ORALLY DISINTEGRATING ORAL EVERY 6 HOURS PRN
Status: DISCONTINUED | OUTPATIENT
Start: 2024-01-17 | End: 2024-01-17

## 2024-01-17 RX ORDER — SODIUM CHLORIDE, SODIUM LACTATE, POTASSIUM CHLORIDE, CALCIUM CHLORIDE 600; 310; 30; 20 MG/100ML; MG/100ML; MG/100ML; MG/100ML
INJECTION, SOLUTION INTRAVENOUS CONTINUOUS PRN
Status: DISCONTINUED | OUTPATIENT
Start: 2024-01-17 | End: 2024-01-17 | Stop reason: HOSPADM

## 2024-01-17 RX ORDER — HYDROCORTISONE 25 MG/G
CREAM TOPICAL 3 TIMES DAILY PRN
Status: DISCONTINUED | OUTPATIENT
Start: 2024-01-17 | End: 2024-01-19 | Stop reason: HOSPADM

## 2024-01-17 RX ORDER — ACETAMINOPHEN 325 MG/1
650 TABLET ORAL EVERY 4 HOURS PRN
Status: DISCONTINUED | OUTPATIENT
Start: 2024-01-17 | End: 2024-01-19 | Stop reason: HOSPADM

## 2024-01-17 RX ORDER — OXYTOCIN/0.9 % SODIUM CHLORIDE 30/500 ML
340 PLASTIC BAG, INJECTION (ML) INTRAVENOUS CONTINUOUS PRN
Status: DISCONTINUED | OUTPATIENT
Start: 2024-01-17 | End: 2024-01-17 | Stop reason: HOSPADM

## 2024-01-17 RX ORDER — OXYTOCIN/0.9 % SODIUM CHLORIDE 30/500 ML
100-340 PLASTIC BAG, INJECTION (ML) INTRAVENOUS CONTINUOUS PRN
Status: DISCONTINUED | OUTPATIENT
Start: 2024-01-17 | End: 2024-01-19 | Stop reason: HOSPADM

## 2024-01-17 RX ORDER — PROCHLORPERAZINE 25 MG
25 SUPPOSITORY, RECTAL RECTAL EVERY 12 HOURS PRN
Status: DISCONTINUED | OUTPATIENT
Start: 2024-01-17 | End: 2024-01-17 | Stop reason: HOSPADM

## 2024-01-17 RX ORDER — LOPERAMIDE HCL 2 MG
4 CAPSULE ORAL
Status: DISCONTINUED | OUTPATIENT
Start: 2024-01-17 | End: 2024-01-19 | Stop reason: HOSPADM

## 2024-01-17 RX ORDER — LIDOCAINE HYDROCHLORIDE 10 MG/ML
INJECTION, SOLUTION INFILTRATION; PERINEURAL PRN
Status: DISCONTINUED | OUTPATIENT
Start: 2024-01-17 | End: 2024-01-17

## 2024-01-17 RX ORDER — LIDOCAINE HYDROCHLORIDE AND EPINEPHRINE 15; 5 MG/ML; UG/ML
3 INJECTION, SOLUTION EPIDURAL
Status: COMPLETED | OUTPATIENT
Start: 2024-01-17 | End: 2024-01-17

## 2024-01-17 RX ORDER — METOCLOPRAMIDE 10 MG/1
10 TABLET ORAL EVERY 6 HOURS PRN
Status: DISCONTINUED | OUTPATIENT
Start: 2024-01-17 | End: 2024-01-17 | Stop reason: HOSPADM

## 2024-01-17 RX ORDER — IBUPROFEN 400 MG/1
800 TABLET, FILM COATED ORAL EVERY 6 HOURS PRN
Status: DISCONTINUED | OUTPATIENT
Start: 2024-01-17 | End: 2024-01-19 | Stop reason: HOSPADM

## 2024-01-17 RX ORDER — DOCUSATE SODIUM 100 MG/1
100 CAPSULE, LIQUID FILLED ORAL DAILY
Status: DISCONTINUED | OUTPATIENT
Start: 2024-01-17 | End: 2024-01-19 | Stop reason: HOSPADM

## 2024-01-17 RX ORDER — MISOPROSTOL 100 UG/1
400 TABLET ORAL
Status: DISCONTINUED | OUTPATIENT
Start: 2024-01-17 | End: 2024-01-17 | Stop reason: HOSPADM

## 2024-01-17 RX ORDER — METOCLOPRAMIDE HYDROCHLORIDE 5 MG/ML
10 INJECTION INTRAMUSCULAR; INTRAVENOUS EVERY 6 HOURS PRN
Status: DISCONTINUED | OUTPATIENT
Start: 2024-01-17 | End: 2024-01-17 | Stop reason: HOSPADM

## 2024-01-17 RX ORDER — METHYLERGONOVINE MALEATE 0.2 MG/ML
200 INJECTION INTRAVENOUS
Status: DISCONTINUED | OUTPATIENT
Start: 2024-01-17 | End: 2024-01-17 | Stop reason: HOSPADM

## 2024-01-17 RX ORDER — ONDANSETRON 2 MG/ML
4 INJECTION INTRAMUSCULAR; INTRAVENOUS EVERY 6 HOURS PRN
Status: DISCONTINUED | OUTPATIENT
Start: 2024-01-17 | End: 2024-01-17 | Stop reason: HOSPADM

## 2024-01-17 RX ORDER — FENTANYL CITRATE-0.9 % NACL/PF 10 MCG/ML
100 PLASTIC BAG, INJECTION (ML) INTRAVENOUS EVERY 5 MIN PRN
Status: DISCONTINUED | OUTPATIENT
Start: 2024-01-17 | End: 2024-01-17 | Stop reason: HOSPADM

## 2024-01-17 RX ORDER — BISACODYL 10 MG
10 SUPPOSITORY, RECTAL RECTAL DAILY PRN
Status: DISCONTINUED | OUTPATIENT
Start: 2024-01-17 | End: 2024-01-19 | Stop reason: HOSPADM

## 2024-01-17 RX ORDER — LIDOCAINE 40 MG/G
CREAM TOPICAL
Status: DISCONTINUED | OUTPATIENT
Start: 2024-01-17 | End: 2024-01-17 | Stop reason: HOSPADM

## 2024-01-17 RX ORDER — CARBOPROST TROMETHAMINE 250 UG/ML
250 INJECTION, SOLUTION INTRAMUSCULAR
Status: DISCONTINUED | OUTPATIENT
Start: 2024-01-17 | End: 2024-01-19 | Stop reason: HOSPADM

## 2024-01-17 RX ORDER — OXYTOCIN 10 [USP'U]/ML
10 INJECTION, SOLUTION INTRAMUSCULAR; INTRAVENOUS
Status: DISCONTINUED | OUTPATIENT
Start: 2024-01-17 | End: 2024-01-17 | Stop reason: HOSPADM

## 2024-01-17 RX ORDER — OXYTOCIN/0.9 % SODIUM CHLORIDE 30/500 ML
1-24 PLASTIC BAG, INJECTION (ML) INTRAVENOUS CONTINUOUS
Status: DISCONTINUED | OUTPATIENT
Start: 2024-01-17 | End: 2024-01-17 | Stop reason: HOSPADM

## 2024-01-17 RX ORDER — TRANEXAMIC ACID 10 MG/ML
1 INJECTION, SOLUTION INTRAVENOUS EVERY 30 MIN PRN
Status: DISCONTINUED | OUTPATIENT
Start: 2024-01-17 | End: 2024-01-17 | Stop reason: HOSPADM

## 2024-01-17 RX ORDER — PROCHLORPERAZINE MALEATE 10 MG
10 TABLET ORAL EVERY 6 HOURS PRN
Status: DISCONTINUED | OUTPATIENT
Start: 2024-01-17 | End: 2024-01-17 | Stop reason: HOSPADM

## 2024-01-17 RX ORDER — NALBUPHINE HYDROCHLORIDE 20 MG/ML
2.5-5 INJECTION, SOLUTION INTRAMUSCULAR; INTRAVENOUS; SUBCUTANEOUS EVERY 6 HOURS PRN
Status: DISCONTINUED | OUTPATIENT
Start: 2024-01-17 | End: 2024-01-19 | Stop reason: HOSPADM

## 2024-01-17 RX ORDER — METHYLERGONOVINE MALEATE 0.2 MG/ML
200 INJECTION INTRAVENOUS
Status: DISCONTINUED | OUTPATIENT
Start: 2024-01-17 | End: 2024-01-19 | Stop reason: HOSPADM

## 2024-01-17 RX ORDER — IBUPROFEN 400 MG/1
800 TABLET, FILM COATED ORAL
Status: DISCONTINUED | OUTPATIENT
Start: 2024-01-17 | End: 2024-01-19 | Stop reason: HOSPADM

## 2024-01-17 RX ORDER — OXYTOCIN/0.9 % SODIUM CHLORIDE 30/500 ML
340 PLASTIC BAG, INJECTION (ML) INTRAVENOUS CONTINUOUS PRN
Status: DISCONTINUED | OUTPATIENT
Start: 2024-01-17 | End: 2024-01-19 | Stop reason: HOSPADM

## 2024-01-17 RX ADMIN — SODIUM CHLORIDE, POTASSIUM CHLORIDE, SODIUM LACTATE AND CALCIUM CHLORIDE: 600; 310; 30; 20 INJECTION, SOLUTION INTRAVENOUS at 06:06

## 2024-01-17 RX ADMIN — Medication 2 MILLI-UNITS/MIN: at 06:06

## 2024-01-17 RX ADMIN — LIDOCAINE HYDROCHLORIDE 3 ML: 10 INJECTION, SOLUTION INFILTRATION; PERINEURAL at 07:42

## 2024-01-17 RX ADMIN — SODIUM CITRATE AND CITRIC ACID MONOHYDRATE 30 ML: 500; 334 SOLUTION ORAL at 07:22

## 2024-01-17 RX ADMIN — LIDOCAINE HYDROCHLORIDE AND EPINEPHRINE 2 ML: 15; 5 INJECTION, SOLUTION EPIDURAL at 07:53

## 2024-01-17 RX ADMIN — ACETAMINOPHEN 650 MG: 325 TABLET, FILM COATED ORAL at 19:32

## 2024-01-17 RX ADMIN — Medication 10 ML/HR: at 07:54

## 2024-01-17 RX ADMIN — IBUPROFEN 800 MG: 400 TABLET, FILM COATED ORAL at 17:00

## 2024-01-17 RX ADMIN — LIDOCAINE HYDROCHLORIDE AND EPINEPHRINE 3 ML: 15; 5 INJECTION, SOLUTION EPIDURAL at 07:50

## 2024-01-17 ASSESSMENT — ACTIVITIES OF DAILY LIVING (ADL)
CONCENTRATING,_REMEMBERING_OR_MAKING_DECISIONS_DIFFICULTY: NO
WALKING_OR_CLIMBING_STAIRS_DIFFICULTY: NO
ADLS_ACUITY_SCORE: 20
DRESSING/BATHING_DIFFICULTY: NO
TOILETING_ISSUES: NO
ADLS_ACUITY_SCORE: 20
ADLS_ACUITY_SCORE: 22
DIFFICULTY_EATING/SWALLOWING: NO
VISION_MANAGEMENT: GLASSES
FALL_HISTORY_WITHIN_LAST_SIX_MONTHS: NO
ADLS_ACUITY_SCORE: 20
DOING_ERRANDS_INDEPENDENTLY_DIFFICULTY: NO
ADLS_ACUITY_SCORE: 20
ADLS_ACUITY_SCORE: 20
CHANGE_IN_FUNCTIONAL_STATUS_SINCE_ONSET_OF_CURRENT_ILLNESS/INJURY: NO
HEARING_DIFFICULTY_OR_DEAF: NO
ADLS_ACUITY_SCORE: 20
DIFFICULTY_COMMUNICATING: NO
WEAR_GLASSES_OR_BLIND: YES

## 2024-01-17 NOTE — PROGRESS NOTES
Large amount of urine expelled from patient upon fundal exam at 1400. Did not chart PP 2 hour pad due to large amounts of urine on it. Small amount of postpartum bleeding noted. Up to the bathroom. Voided 500 mL's of light yellow urine. Left side has residual numbness. Stand by assist in room.     David Ambrocio RN 01/17/24 3:08 PM

## 2024-01-17 NOTE — L&D DELIVERY NOTE
OB Vaginal Delivery Note    Jenny Hadley MRN# [id number]   Age: 33 year old YOB: 1990       GA: 39w0d  GP:   Labor Complications: None   EBL:   mL  Delivery QBL:    Delivery Type: Vaginal, Spontaneous   ROM to Delivery Time: (Delivered) Minutes: 50  [place] Weight:     1 Minute 5 Minute 10 Minute   Apgar Totals:   9   9      NO HONG     Delivery Details:  Jenny Hadley, a 33 year old  female delivered a viable male  infant with apgars of   9 and 9  . IOL due to LGA.  Patient was fully dilated and pushing after   2 hours    in active labor. Delivery was via vaginal, spontaneous  to a sterile field under epidural  anesthesia. Infant delivered in vertex  left  occiput  anterior  position. Tight nuchal cord present - clamped and cut.  Anterior and posterior shoulders delivered without difficulty. Cord blood was obtained in routine fashion with the following disposition: lab.  Placenta to pathology  .      Cord complications: nuchal   Placenta delivered at  . Placental disposition was Pathology . Fundal massage performed and fundus found to be firm.     Episiotomy: none    Perineum, vagina, cervix were inspected, and the following lacerations were noted:   Perineal lacerations: none                Any lacerations were repaired in the usual fashion using not applicable .    Excellent hemostasis was noted. Needle count correct. Infant and patient in delivery room in good and stable condition.        Shakira HadleyMounaJenny [[id number]]      Labor Event Times      Latent labor onset date/time: 2024 0530    Active labor onset date: 24 Onset time:  5:30 AM          Rupture date/time: 24 0930   Rupture type: Artificial Rupture of Membranes  Fluid color: Clear  Fluid odor: Normal     Induction: Oxytocin  Induction date/time:      Cervical ripening date/time:      Indications for induction: Fetal Indications or Congenital Malformations (Comment to  specify)       Delivery/Placenta Date and Time      Delivery Date: 1/17/24 Delivery Time: 10:20 AM   Delivering clinician: Pia Rangel MD   Other personnel present at delivery:  Provider Role   David Ambrocio, RN Delivery Nurse             Vaginal Counts       Initial count performed by 2 team members:  Two Team Members   Kei LANDAVERDE, LESTER BLANTON, RN         [place] Suture Needles Sponges (RETIRED) Instruments   Initial counts 1 0 6    Added to count       Relief counts       Final counts               Placed during labor Accounted for at the end of labor   FSE No NA   IUPC No NA   Cervidil No NA                             Cord      Cord Complications: Nuchal   Nuchal Intervention: clamped and cut         Nuchal cord description: tight nuchal cord         Stem cell collection?: No           Labor Events and Shoulder Dystocia    Fetal Tracing Prior to Delivery: Category 1  Shoulder dystocia present?: Neg       Delivery (Maternal) (Provider to Complete) ([id number])    Episiotomy: None  Perineal lacerations: None    Repair suture: None  Genital tract inspection done: Pos       Blood Loss  Mother: Jenny Hadley #[id number]     Start of Mother's Information      Delivery Blood Loss  01/17/24 0530 - 01/17/24 1037      None                 End of Mother's Information  Mother: Jenny Hadley #[id number]                Delivery - Provider to Complete ([id number])    Delivering clinician: Pia Rangel MD  Delivery Type (Choose the 1 that will go to the Birth History): Vaginal, Spontaneous                         Other personnel:  Provider Role   David Ambrocio, RN Delivery Nurse                    Placenta    Removal: Spontaneous  Disposition: Pathology             Anesthesia    Method: Epidural  Cervical dilation at placement: 4-7                    Presentation and Position    Presentation: Vertex    Position: Left Occiput Anterior                     PIA BAILEY  MD

## 2024-01-17 NOTE — PROGRESS NOTES
"S:  Patient reports that her numbness is asymmetric, more left than right   Last report from RN: pitocin at 4    O:  BP 98/57   Temp 97  F (36.1  C)   Ht 1.651 m (5' 5\")   Wt 93.4 kg (206 lb)   LMP 04/10/2023   SpO2 94%   BMI 34.28 kg/m    Fetal monitoring: moderate risk  Contractions:  2-3  Baseline: 135 BPM  Accels: present  Variability:  moderate  Decels:   none  Cervix , AROM clear      Assessment:  Jenny Hadley is a 33 year old female  39w0d IOL  Category 1 tracing  Plan: 1.  Continue same plan    GEMMA BAILEY MD ....................  2024   9:33 AM     "

## 2024-01-17 NOTE — PLAN OF CARE
"Goal Outcome Evaluation:    Jenny Hadley is doing well. Vitals are stable: /59 (BP Location: Right arm, Patient Position: Supine, Cuff Size: Adult Regular)   Temp 97.9  F (36.6  C) (Temporal)   Resp 16   Ht 1.651 m (5' 5\")   Wt 93.4 kg (206 lb)   LMP 04/10/2023   SpO2 97%   Breastfeeding Unknown   BMI 34.28 kg/m      FF, midline and   at the umbilicus . Bleeding is light with no clots noted. Pain has been well managed with oral analgesics. Voiding without difficulty. Patient IS passing gas. Independent in room. Tolerating a Regular diet and oral rehydration. IV is Saline Locked. breastfeeding independently. Bonding well with . Patient has verbalized understanding of routine cares and warning signs.    David Ambrocio RN 24 5:56 PM           "

## 2024-01-17 NOTE — ANESTHESIA PROCEDURE NOTES
"Epidural catheter Procedure Note    Pre-Procedure   Staff -        CRNA: Sal Domingo APRN CRNA       Performed By: CRNA       Location: OB       Procedure Start/Stop Times: 1/17/2024 7:34 AM and 1/17/2024 7:50 AM       Pre-Anesthestic Checklist: patient identified, IV checked, risks and benefits discussed, informed consent, monitors and equipment checked, pre-op evaluation, at physician/surgeon's request and post-op pain management  Timeout:       Correct Patient: Yes        Correct Procedure: Yes        Correct Site: Yes        Correct Position: Yes   Procedure Documentation  Procedure: epidural catheter       Diagnosis: Labor analgesia       Patient Position: sitting       Patient Prep/Sterile Barriers: sterile gloves, mask, patient draped       Skin prep: Chloraprep       Local skin infiltrated with 3 mL of 1% lidocaine.        Insertion Site: L3-4. (midline approach).       Technique: LORT air        JULI at 5 cm.       Needle Type: ToDailyBoothy needle       Needle Gauge: 17.        Needle Length (Inches): 3.5        Catheter: 19 G.          Catheter threaded easily.         8 cm epidural space.         Threaded 13 cm at skin.         # of attempts: 1 and  # of redirects:  1    Assessment/Narrative         Paresthesias: No.       Test dose of 3 mL lidocaine 1.5% w/ 1:200,000 epinephrine at 07:50 CST.         Test dose negative, 3 minutes after injection, for signs of intravascular, subdural, or intrathecal injection.       Insertion/Infusion Method: LORT air       No aspiration negative for Heme or CSF via Epidural Catheter.    Medication(s) Administered   Medication Administration Time: 1/17/2024 7:34 AM      FOR [facility] ONLY:   Pain Team Contact information: please page the Pain Team Via [employer]. Search \"Pain\". During daytime hours, please page the attending first. At night please page the resident first.      "

## 2024-01-17 NOTE — ANESTHESIA PREPROCEDURE EVALUATION
"Anesthesia Pre-Procedure Evaluation    Patient: Jenny Hadley   MRN: [id number] : 1990        Procedure : * No procedures listed *          Past Medical History:   Diagnosis Date    Anemia     Carrier of group B Streptococcus     Complication of anesthesia     History of prior pregnancies     Molar pregnancy 2018      Past Surgical History:   Procedure Laterality Date    DENTAL SURGERY      wisdom teeth    DILATION AND CURETTAGE SUCTION N/A 2018    Procedure: DILATION AND CURETTAGE SUCTION;  Suction Dilation & Curettage;  Surgeon: Wily Mars MD;  Location: [facility] OR      Allergies   Allergen Reactions    Cats Itching and Shortness Of Breath      Social History     Tobacco Use    Smoking status: Never     Passive exposure: Past    Smokeless tobacco: Never   Substance Use Topics    Alcohol use: Not Currently     Alcohol/week: 0.0 standard drinks of alcohol     Comment: occasional      Wt Readings from Last 1 Encounters:   24 93.4 kg (206 lb)        Anesthesia Evaluation   Pt has had prior anesthetic. Type: Regional.    No history of anesthetic complications       ROS/MED HX  ENT/Pulmonary: Comment: Hx of childhood asthma, but has not required treatment for \"years\"      Neurologic:  - neg neurologic ROS     Cardiovascular:  - neg cardiovascular ROS     METS/Exercise Tolerance: >4 METS    Hematologic:  - neg hematologic  ROS     Musculoskeletal:  - neg musculoskeletal ROS     GI/Hepatic:     (+) GERD, Symptomatic,                  Renal/Genitourinary:  - neg Renal ROS     Endo:  - neg endo ROS     Psychiatric/Substance Use:  - neg psychiatric ROS     Infectious Disease:  - neg infectious disease ROS     Malignancy:  - neg malignancy ROS     Other:      (+) Possibly pregnant, , ,         Physical Exam    Airway        Mallampati: I   TM distance: > 3 FB   Neck ROM: full   Mouth opening: > 3 cm    Respiratory Devices and Support         Dental       (+) Completely normal " "teeth      Cardiovascular   cardiovascular exam normal       Rhythm and rate: regular and normal     Pulmonary   pulmonary exam normal        breath sounds clear to auscultation           OUTSIDE LABS:  CBC:   Lab Results   Component Value Date    WBC 11.7 (H) 01/17/2024    WBC 8.3 10/23/2023    HGB 11.5 (L) 01/17/2024    HGB 10.9 (L) 10/23/2023    HCT 35.4 01/17/2024    HCT 32.9 (L) 10/23/2023     01/17/2024     10/23/2023     BMP:   Lab Results   Component Value Date     05/13/2018    POTASSIUM 3.9 05/13/2018    CHLORIDE 105 05/13/2018    CO2 24 05/13/2018    BUN 8 05/13/2018    CR 0.53 (L) 11/23/2019    CR 0.51 (L) 05/13/2018    GLC 92 05/13/2018     COAGS: No results found for: \"PTT\", \"INR\", \"FIBR\"  POC:   Lab Results   Component Value Date    HCG Positive (A) 06/09/2023     HEPATIC:   Lab Results   Component Value Date    ALBUMIN 3.7 05/13/2018    PROTTOTAL 6.4 05/13/2018    ALT 25 05/13/2018    AST 14 05/13/2018    ALKPHOS 41 05/13/2018    BILITOTAL 1.0 05/13/2018     OTHER:   Lab Results   Component Value Date    GRETA 9.0 05/13/2018    LIPASE 23 05/13/2018       Anesthesia Plan    ASA Status:  2    NPO Status:  NPO Appropriate    Anesthesia Type: Epidural.   Induction: N/a.   Maintenance: N/A.        Consents    Anesthesia Plan(s) and associated risks, benefits, and realistic alternatives discussed. Questions answered and patient/representative(s) expressed understanding.     - Discussed: Risks, Benefits and Alternatives for the PROCEDURE were discussed     - Discussed with:  Patient      - Extended Intubation/Ventilatory Support Discussed: No.      - Patient is DNR/DNI Status: No     Use of blood products discussed: No .     Postoperative Care            Comments:               ANDREA Tolbert CRNA    I have reviewed the pertinent notes and labs in the chart from the past 30 days and (re)examined the patient.  Any updates or changes from those notes are reflected in this note.           "

## 2024-01-17 NOTE — PROGRESS NOTES
Patient reports that her left side is very numb, however can move her right leg and feel cleaning to perineum for hutchison placement. SVE 6/100/0, head applied, bulging bag. Repositioned onto right side and patient to push her PCA button. Will try to place hutchison again in 30 minutes. Patient coping.    David Ambrocio RN 01/17/24 9:09 AM

## 2024-01-17 NOTE — PROGRESS NOTES
AROM at 0930 with clear, moderate fluids. SVE unchanged. Patient is more comfortable at this time. Using PCA button at this time.    David Ambrocio RN 01/17/24 9:35 AM

## 2024-01-17 NOTE — H&P
OB ADMISSION NOTE    CHIEF COMPLAINT:  IOL    OBSTETRICAL / DATING HISTORY:  Estimated Date of Delivery: 2024  Gestational Age:  39w0d    OB History    Para Term  AB Living   5 3 2 1 1 3   SAB IAB Ectopic Multiple Live Births   0 0 0 0 3      # Outcome Date GA Lbr Juanito/2nd Weight Sex Delivery Anes PTL Lv   5 Current            4  22 36w3d 06:30 / 00:16 3.668 kg (8 lb 1.4 oz) F Vag-Spont EPI N FARHAN      Name: Colleen      Apgar1: 8  Apgar5: 9   3 Term 19 38w1d 01:31 2.931 kg (6 lb 7.4 oz) F Vag-Spont IV, INT N FARHAN      Name: Arun      Apgar1: 9  Apgar5: 9   2 Molar 18           1 Term 16 39w1d 11:00 3.487 kg (7 lb 11 oz) M Vag-Spont EPI N FARHAN      Name: Hira      Obstetric Comments   Placenta to pathology each pregnancy due to molar history    Lab Results                         A Rh Positive       2016             GBS positive 2022    QUAD yes         TESTING:  Hemoglobin   Date Value Ref Range Status   2024 11.5 (L) 11.7 - 15.7 g/dL Final   2021 13.0 11.7 - 15.7 g/dL Final   ]  Lab Results   Component Value Date    ABO A 2021    RH Pos 2021    [id number] A Rh Positive 2016           PAST MEDICAL HISTORY:  Past Medical History:   Diagnosis Date    Anemia     Carrier of group B Streptococcus     Complication of anesthesia     History of prior pregnancies     Molar pregnancy 2018        PAST SURGICAL HISTORY:  Past Surgical History:   Procedure Laterality Date    DENTAL SURGERY      wisdom teeth    DILATION AND CURETTAGE SUCTION N/A 2018    Procedure: DILATION AND CURETTAGE SUCTION;  Suction Dilation & Curettage;  Surgeon: Wily Mars MD;  Location:  OR        ALLERGIES:     Allergies   Allergen Reactions    Cats Itching and Shortness Of Breath          reports that she has never smoked. She has been exposed to tobacco smoke. She has never used smokeless tobacco. She reports that  she does not currently use alcohol. She reports that she does not use drugs.    PREGNANCY RISK FACTORS:  History of molar pregnancy; LGA    HISTORY OF PRESENT ILLNESS:    (Please see scanned  sheets for prenatal history. Examination at the time of admission revealed no interval change in the patient s history or physical exam except as described below.)    Jenny Hadley is a 33 year old female  at 39w0d who presents IOL due to LGA and advanced cervical dilitation    REVIEW OF SYSTEMS:  Review Of Systems  Skin: negative  Eyes: negative  Ears/Nose/Throat: negative  Respiratory: No shortness of breath, dyspnea on exertion, cough, or hemoptysis  Cardiovascular: negative  Gastrointestinal: negative  Genitourinary: negative  Musculoskeletal: negative  Neurologic: negative  Psychiatric: negative  Hematologic/Lymphatic/Immunologic: negative  Endocrine: negative      PHYSICAL EXAM: BP 96/61   Temp 97  F (36.1  C)   LMP 04/10/2023   SpO2 100%   Physical Exam    General: alert, no distress  CV:  RRR, no murmur  Lungs - CTA  Abdomen - gravid  EXT:  tr edema   Getting her epidural in     Cervix 6 cm/ 90%    Membrane Status:  intact  Fetal Presentation:vertex   Morgan Score:  over 8      Fetal monitoring: moderate risk  Contractions:  every 3  Baseline: 130 BPM  Accels: present  Variability:  moderate  Decels:   none    EFW:  4000 gms  Pelvic assessment for adequacy: yes     Impression:  1.  Jenny Hadley is a 33 year old female   39w0d IOL  2.  Category 1 tracing.  3.  LGA  4.  History of molar pregnency  Plan:  1.EFM, [place], IV started, pitocin started  2.  Epidural being placed  3.  Placenta to pathology     Pia Tong MD

## 2024-01-18 PROCEDURE — 250N000013 HC RX MED GY IP 250 OP 250 PS 637: Performed by: FAMILY MEDICINE

## 2024-01-18 PROCEDURE — 120N000001 HC R&B MED SURG/OB

## 2024-01-18 RX ADMIN — ACETAMINOPHEN 650 MG: 325 TABLET, FILM COATED ORAL at 09:02

## 2024-01-18 RX ADMIN — IBUPROFEN 800 MG: 400 TABLET, FILM COATED ORAL at 01:18

## 2024-01-18 RX ADMIN — ACETAMINOPHEN 650 MG: 325 TABLET, FILM COATED ORAL at 17:37

## 2024-01-18 RX ADMIN — IBUPROFEN 800 MG: 400 TABLET, FILM COATED ORAL at 19:29

## 2024-01-18 RX ADMIN — DOCUSATE SODIUM 100 MG: 100 CAPSULE, LIQUID FILLED ORAL at 09:02

## 2024-01-18 RX ADMIN — IBUPROFEN 800 MG: 400 TABLET, FILM COATED ORAL at 14:08

## 2024-01-18 RX ADMIN — IBUPROFEN 800 MG: 400 TABLET, FILM COATED ORAL at 08:10

## 2024-01-18 ASSESSMENT — ACTIVITIES OF DAILY LIVING (ADL)
ADLS_ACUITY_SCORE: 20

## 2024-01-18 NOTE — PLAN OF CARE
"Goal Outcome Evaluation:      Jenny Hadley is doing well. Vitals are stable: /65 (BP Location: Right arm, Patient Position: Semi-Palmer's, Cuff Size: Adult Regular)   Pulse 55   Temp 97.5  F (36.4  C) (Temporal)   Resp 16   Ht 1.651 m (5' 5\")   Wt 93.4 kg (206 lb)   LMP 04/10/2023   SpO2 98%   Breastfeeding Unknown   BMI 34.28 kg/m      FF, midline and at at the level of   the umbilicus . Bleeding is light with small clots noted. Pain noted to BLE with minor swelling, pain has been well managed with oral analgesics. Voiding without difficulty. Patient IS passing gas. Independent in room. Tolerating a Regular diet and oral rehydration. IV is Saline Locked. breastfeeding independently. Bonding well with . Patient has verbalized understanding of routine cares and warning signs.                       "

## 2024-01-18 NOTE — PROGRESS NOTES
[facility]    Post-Partum Progress Note    Assessment & Plan   Assessment:  Post-partum day #1  IVD    Doing well.  No excessive bleeding  Pain well-controlled.    Plan:  Ambulation encouraged  Breast feeding strategies discussed  Pain control measures as needed    GEMMA BAILEY MD     Interval History   Doing well.  Pain is adequately controlled.  No fevers.  No history of foul-smelling vaginal discharge.  Good appetite.  Denies chest pain, shortness of breath, nausea or vomiting.  Vaginal bleeding is similar to a heavy menstrual flow.  Breastfeeding well.    Medications    - MEDICATION INSTRUCTIONS -      - MEDICATION INSTRUCTIONS -      oxytocin in 0.9% NaCl 100 mL/hr (01/17/24 1055)    oxytocin        docusate sodium  100 mg Oral Daily       Physical Exam   Temp: 97.5  F (36.4  C) Temp src: Temporal BP: 114/65 Pulse: 55   Resp: 16 SpO2: 98 % O2 Device: None (Room air)    Vitals:    01/17/24 0738   Weight: 93.4 kg (206 lb)     Vital Signs with Ranges  Temp:  [96.8  F (36  C)-97.9  F (36.6  C)] 97.5  F (36.4  C)  Pulse:  [55] 55  Resp:  [16] 16  BP: ()/(51-80) 114/65  SpO2:  [86 %-98 %] 98 %  I/O last 3 completed shifts:  In: -   Out: 2124 [Urine:2000; Blood:124]    Uterine fundus is firm, non-tender and at the level of the umbilicus    Data   Recent Labs   Lab Test 01/17/24  0555 01/10/22  1852 06/18/21  1509   ABO  --   --  A   RH  --   --  Pos   AS Negative   < > Neg    < > = values in this interval not displayed.     Recent Labs   Lab Test 01/17/24  0555 10/23/23  1050   HGB 11.5* 10.9*     Recent Labs   Lab Test 07/03/23  1024   RUQIGG Positive        22

## 2024-01-18 NOTE — ANESTHESIA POSTPROCEDURE EVALUATION
Patient: Jenny Hadley    Procedure: * No procedures listed *       Anesthesia Type:  Epidural    Note:  Disposition: Inpatient   Postop Pain Control: Uneventful            Sign Out: Well controlled pain   PONV: No   Neuro/Psych: Uneventful            Sign Out: Acceptable/Baseline neuro status   Airway/Respiratory: Uneventful            Sign Out: Acceptable/Baseline resp. status   CV/Hemodynamics: Uneventful            Sign Out: Acceptable CV status; No obvious hypovolemia; No obvious fluid overload   Other NRE: NONE   DID A NON-ROUTINE EVENT OCCUR? No           Last vitals:  Vitals:    01/17/24 1700 01/18/24 0118 01/18/24 0902   BP: 107/59 114/65 123/72   Pulse:  55 77   Resp: 16 16 20   Temp: 97.9  F (36.6  C) 97.5  F (36.4  C) 97.8  F (36.6  C)   SpO2: 97% 98% 98%       Electronically Signed By: ANDREA WILKINSON CRNA  January 18, 2024  2:49 PM

## 2024-01-19 VITALS
HEIGHT: 65 IN | RESPIRATION RATE: 16 BRPM | TEMPERATURE: 99.3 F | OXYGEN SATURATION: 97 % | WEIGHT: 196.7 LBS | DIASTOLIC BLOOD PRESSURE: 67 MMHG | HEART RATE: 96 BPM | SYSTOLIC BLOOD PRESSURE: 117 MMHG | BODY MASS INDEX: 32.77 KG/M2

## 2024-01-19 LAB
PATH REPORT.COMMENTS IMP SPEC: NORMAL
PATH REPORT.FINAL DX SPEC: NORMAL
PHOTO IMAGE: NORMAL

## 2024-01-19 PROCEDURE — 250N000013 HC RX MED GY IP 250 OP 250 PS 637: Performed by: FAMILY MEDICINE

## 2024-01-19 RX ADMIN — ACETAMINOPHEN 650 MG: 325 TABLET, FILM COATED ORAL at 04:49

## 2024-01-19 ASSESSMENT — ACTIVITIES OF DAILY LIVING (ADL)
ADLS_ACUITY_SCORE: 20

## 2024-01-19 NOTE — PLAN OF CARE
Temp: 97.2  F (36.2  C) Temp src: Temporal BP: 113/59 Pulse: 79   Resp: 16 SpO2: 97 % O2 Device: None (Room air)      Assessments as charted. Uterus firm, without massage, 1 cm below umbilicus. Small amount of lochia rubra. Mother bonding well with  and participating in care. Voiding spontaneously without difficulty. Reports 2/10 cramping, received PRN Ibuprofen and Tylenol this shift. Breastfeeding, getting  to breast independently.

## 2024-01-19 NOTE — DISCHARGE SUMMARY
[facility]  Discharge Summary    Jenny Hadley MRN# [id number]   Age: 33 year old YOB: 1990     Date of Admission:  1/17/2024  Date of Discharge::  1/19/2024  Admitting Physician:  Pia Bailey MD  Discharge Physician:  PIA BAILEY MD     Home clinic: [facility]           Admission Diagnoses:   Excessive fetal growth affecting management of pregnancy in third trimester, single or unspecified fetus [O36.63X0]          Discharge Diagnosis:     IVD - pitocin   Intrauterine pregnancy at 39 weeks gestation          Procedures:     Procedure(s): Pitocin IOL        Anesthesia:  epidural             Medications Prior to Admission:     Medications Prior to Admission   Medication Sig Dispense Refill Last Dose    docusate sodium (COLACE) 100 MG capsule Take 100 mg by mouth every other day   1/16/2024    ferrous gluconate (FERGON) 324 (38 Fe) MG tablet Take 1 tablet (324 mg) by mouth daily with food 90 tablet 3 1/17/2024    Prenatal Vit-Fe Fumarate-FA (PRENATAL MULTIVITAMIN  PLUS IRON) 27-1 MG TABS Take 1 tablet by mouth daily 90 tablet 3 1/17/2024             Discharge Medications:     Current Discharge Medication List        CONTINUE these medications which have NOT CHANGED    Details   docusate sodium (COLACE) 100 MG capsule Take 100 mg by mouth every other day      ferrous gluconate (FERGON) 324 (38 Fe) MG tablet Take 1 tablet (324 mg) by mouth daily with food  Qty: 90 tablet, Refills: 3    Associated Diagnoses: High-risk pregnancy, unspecified trimester; Anemia during pregnancy in third trimester      Prenatal Vit-Fe Fumarate-FA (PRENATAL MULTIVITAMIN  PLUS IRON) 27-1 MG TABS Take 1 tablet by mouth daily  Qty: 90 tablet, Refills: 3    Associated Diagnoses: High-risk pregnancy, unspecified trimester                   Consultations:   No consultations were requested during this admission          Brief History of Labor:    Jenny Hadley is a 33 year old female is a  admitted at 39w0d due to LGA and IOL.  She was 6cm upon admission.  Pitocin started after NST.  AROM performed, clear fluid.  Epidural analgesia.  Uncomplicated delivery.           Hospital Course:   The patient's hospital course was unremarkable.  On discharge, her pain was well controlled. Vaginal bleeding is similar to peak menstrual flow.  Voiding without difficulty.  Ambulating well and tolerating a normal diet.  No fever.  Breastfeeding well.  Infant is stable.  She was discharged on post-partum day #2.    Post-partum hemoglobin:   Hemoglobin   Date Value Ref Range Status   2024 11.5 (L) 11.7 - 15.7 g/dL Final   2021 13.0 11.7 - 15.7 g/dL Final             Discharge Instructions and Follow-Up:     Discharge diet: Regular   Discharge activity: Pelvic rest: abstain from intercourse and do not use tampons for 6 week(s)   Discharge follow-up: PP check in 6 weeks  Lactation appointment     Wound care:            Discharge Disposition:     Discharged to home        GEMMA BAILEY MD

## 2024-01-19 NOTE — PLAN OF CARE
Problem: Adult Inpatient Plan of Care  Goal: Readiness for Transition of Care  Outcome: Met  Flowsheets (Taken 1/19/2024 1021)  Anticipated Changes Related to Illness: none  Concerns to be Addressed:   all concerns addressed in this encounter   no discharge needs identified   denies needs/concerns at this time  Barriers to Discharge: none  Intervention: Mutually Develop Transition Plan  Recent Flowsheet Documentation  Taken 1/19/2024 1021 by Pia Newton RN  Anticipated Changes Related to Illness: none  Concerns to be Addressed:   all concerns addressed in this encounter   no discharge needs identified   denies needs/concerns at this time   Goal Outcome Evaluation:         Mother discharged home with baby and . Escorted to hospital entrance per myself. Discharge instructions given both written and verbal and questions answered.

## 2024-01-19 NOTE — DISCHARGE INSTRUCTIONS
Warning Signs after Having a Baby    Keep this paper on your fridge or somewhere else where you can see it.    Call your provider if you have any of these symptoms up to 12 weeks after having your baby.    Thoughts of hurting yourself or your baby  Pain in your chest or trouble breathing  Severe headache not helped by pain medicine  Eyesight concerns (blurry vision, seeing spots or flashes of light, other changes to eyesight)  Fainting, shaking or other signs of a seizure    Call 9-1-1 if you feel that it is an emergency.     The symptoms below can happen to anyone after giving birth. They can be very serious. Call your provider if you have any of these warning signs.    My provider s phone number: _______________________    Losing too much blood (hemorrhage)    Call your provider if you soak through a pad in less than an hour or pass blood clots bigger than a golf ball. These may be signs that you are bleeding too much.    Blood clots in the legs or lungs    After you give birth, your body naturally clots its blood to help prevent blood loss. Sometimes this increased clotting can happen in other areas of the body, like the legs or lungs. This can block your blood flow and be very dangerous.     Call your provider if you:  Have a red, swollen spot on the back of your leg that is warm or painful when you touch it.   Are coughing up blood.     Infection    Call your provider if you have any of these symptoms:  Fever of 100.4 F (38 C) or higher.  Pain or redness around your stitches if you had an incision.   Any yellow, white, or green fluid coming from places where you had stitches or surgery.    Mood Problems (postpartum depression)    Many people feel sad or have mood changes after having a baby. But for some people, these mood swings are worse.     Call your provider right away if you feel so anxious or nervous that you can't care for yourself or your baby.    Preeclampsia (high blood pressure)    Even if you  didn't have high blood pressure when you were pregnant, you are at risk for the high blood pressure disease called preeclampsia. This risk can last up to 12 weeks after giving birth.     Call your provider if you have:   Pain on your right side under your rib cage  Sudden swelling in the hands and face    Remember: You know your body. If something doesn't feel right, get medical help.     For informational purposes only. Not to replace the advice of your health care provider. Copyright 2020 [facility]. All rights reserved. Clinically reviewed by Jessica Haddad RNC-OB, MSN. [employer] [id number] - Rev 02/23.    Please refer to mother baby education book for mdischarge instructions.    Contact physician for questions and or concerns.

## 2024-01-22 ENCOUNTER — MEDICAL CORRESPONDENCE (OUTPATIENT)
Dept: HEALTH INFORMATION MANAGEMENT | Facility: OTHER | Age: 34
End: 2024-01-22
Payer: COMMERCIAL

## 2024-02-07 ENCOUNTER — OFFICE VISIT (OUTPATIENT)
Dept: FAMILY MEDICINE | Facility: OTHER | Age: 34
End: 2024-02-07
Attending: FAMILY MEDICINE
Payer: COMMERCIAL

## 2024-02-07 VITALS
TEMPERATURE: 98.8 F | RESPIRATION RATE: 14 BRPM | OXYGEN SATURATION: 99 % | HEART RATE: 67 BPM | DIASTOLIC BLOOD PRESSURE: 68 MMHG | BODY MASS INDEX: 31.88 KG/M2 | WEIGHT: 191.6 LBS | SYSTOLIC BLOOD PRESSURE: 120 MMHG

## 2024-02-07 DIAGNOSIS — J02.0 STREP PHARYNGITIS: Primary | ICD-10-CM

## 2024-02-07 DIAGNOSIS — J02.9 SORE THROAT: ICD-10-CM

## 2024-02-07 LAB — GROUP A STREP BY PCR: DETECTED

## 2024-02-07 PROCEDURE — 87651 STREP A DNA AMP PROBE: CPT | Mod: ZL | Performed by: FAMILY MEDICINE

## 2024-02-07 PROCEDURE — 99213 OFFICE O/P EST LOW 20 MIN: CPT | Performed by: FAMILY MEDICINE

## 2024-02-07 RX ORDER — PENICILLIN V POTASSIUM 500 MG/1
500 TABLET, FILM COATED ORAL 2 TIMES DAILY
Qty: 20 TABLET | Refills: 0 | Status: SHIPPED | OUTPATIENT
Start: 2024-02-07 | End: 2024-02-17

## 2024-02-07 ASSESSMENT — ENCOUNTER SYMPTOMS: SORE THROAT: 1

## 2024-02-07 ASSESSMENT — PAIN SCALES - GENERAL: PAINLEVEL: MODERATE PAIN (4)

## 2024-02-07 NOTE — NURSING NOTE
"Chief Complaint   Patient presents with    Pharyngitis       Initial /68   Pulse 67   Temp 98.8  F (37.1  C) (Tympanic)   Resp 14   Wt 86.9 kg (191 lb 9.6 oz)   LMP 04/10/2023   SpO2 99%   Breastfeeding Yes   BMI 31.88 kg/m   Estimated body mass index is 31.88 kg/m  as calculated from the following:    Height as of 1/17/24: 1.651 m (5' 5\").    Weight as of this encounter: 86.9 kg (191 lb 9.6 oz).  Medication Reconciliation: complete          "

## 2024-02-07 NOTE — PROGRESS NOTES
"  Assessment & Plan     (J02.0) Strep pharyngitis  (primary encounter diagnosis)  Comment:  pcn given  Plan: penicillin V (VEETID) 500 MG tablet             (J02.9) Sore throat  Comment:    Plan: Group A Streptococcus PCR Throat Swab                     BMI  Estimated body mass index is 31.88 kg/m  as calculated from the following:    Height as of 1/17/24: 1.651 m (5' 5\").    Weight as of this encounter: 86.9 kg (191 lb 9.6 oz).             No follow-ups on file.    Subjective   Jenny is a 33 year old, presenting for the following health issues:  Pharyngitis      2/7/2024    10:17 AM   Additional Questions   Roomed by CARLYLE London   Accompanied by Children         2/7/2024    10:17 AM   Patient Reported Additional Medications   Patient reports taking the following new medications N/A     History of Present Illness       Reason for visit:  Sore throat and ears  Symptom onset:  3-7 days ago    She eats 2-3 servings of fruits and vegetables daily.She consumes 1 sweetened beverage(s) daily.She exercises with enough effort to increase her heart rate 10 to 19 minutes per day.  She exercises with enough effort to increase her heart rate 3 or less days per week.   She is taking medications regularly.     Pain with swallowing. Some mild ear pain as well as molar pain, mildly. Has had similar symptoms in the past with strep. Has several children, none are sick but son has it in his school. 3 weeks post partum, breast feeding. No fevers. No cough at all. No rash.     Current Outpatient Medications   Medication    docusate sodium (COLACE) 100 MG capsule    ferrous gluconate (FERGON) 324 (38 Fe) MG tablet    Prenatal Vit-Fe Fumarate-FA (PRENATAL MULTIVITAMIN  PLUS IRON) 27-1 MG TABS     No current facility-administered medications for this visit.                     Objective    /68   Pulse 67   Temp 98.8  F (37.1  C) (Tympanic)   Resp 14   Wt 86.9 kg (191 lb 9.6 oz)   LMP 04/10/2023   SpO2 99%   Breastfeeding " Yes   BMI 31.88 kg/m    Body mass index is 31.88 kg/m .  Physical Exam  Constitutional:       Appearance: Normal appearance.   HENT:      Right Ear: Tympanic membrane normal.      Left Ear: Tympanic membrane normal.      Mouth/Throat:      Mouth: Mucous membranes are moist.      Pharynx: Posterior oropharyngeal erythema present.      Comments: Minimal pharyngeal erythema, without tonsillar enlargement   Cardiovascular:      Rate and Rhythm: Normal rate and regular rhythm.   Pulmonary:      Effort: Pulmonary effort is normal. No respiratory distress.      Breath sounds: No stridor.   Lymphadenopathy:      Cervical: No cervical adenopathy.   Neurological:      General: No focal deficit present.      Mental Status: She is alert and oriented to person, place, and time.   Psychiatric:         Mood and Affect: Mood normal.         Behavior: Behavior normal.         Thought Content: Thought content normal.            Results for orders placed or performed in visit on 02/07/24   Group A Streptococcus PCR Throat Swab     Status: Abnormal    Specimen: Throat; Swab   Result Value Ref Range    Group A strep by PCR Detected (A) Not Detected    Narrative    The Xpert Xpress Strep A test, performed on the [employer], is a rapid, qualitative in vitro diagnostic test for the detection of Streptococcus pyogenes (Group A ß-hemolytic Streptococcus, Strep A) in throat swab specimens from patients with signs and symptoms of pharyngitis. The Xpert Xpress Strep A test can be used as an aid in the diagnosis of Group A Streptococcal pharyngitis. The assay is not intended to monitor treatment for Group A Streptococcus infections. The Xpert Xpress Strep A test utilizes an automated real-time polymerase chain reaction (PCR) to detect Streptococcus pyogenes DNA.             Signed Electronically by: Parmjit Mneendez MD     4

## 2024-02-19 NOTE — PROGRESS NOTES
"Nursing Notes:   Basia Holland LPN  2024  3:05 PM  Signed  Chief Complaint   Patient presents with    Postpartum Care     6 week postpartum       Initial /70 (BP Location: Right arm, Patient Position: Sitting, Cuff Size: Adult Regular)   Pulse 65   Temp 97.6  F (36.4  C) (Temporal)   Resp 14   Ht 1.651 m (5' 5\")   Wt 86.8 kg (191 lb 6.4 oz)   LMP 04/10/2023   SpO2 97%   Breastfeeding Yes   BMI 31.85 kg/m   Estimated body mass index is 31.85 kg/m  as calculated from the following:    Height as of this encounter: 1.651 m (5' 5\").    Weight as of this encounter: 86.8 kg (191 lb 6.4 oz).    Medication Review: complete    The next two questions are to help us understand your food security.  If you are feeling you need any assistance in this area, we have resources available to support you today.          2023   SDOH- Food Insecurity   Within the past 12 months, did you worry that your food would run out before you got money to buy more? N   Within the past 12 months, did the food you bought just not last and you didn t have money to get more? N       Health Care Directive:  Patient does not have a Health Care Directive or Living Will: Discussed advance care planning with patient; however, patient declined at this time.    Basia Holland LPN           SUBJECTIVE: Jenny is here for a 6-week postpartum checkup.    39w0d  She was induced at 39 weeks due to LGA.   Delivered male infant with birth wt 3765gms.    2 hour active phase of labor.  Epidural anesthesia.    Placenta to pathology due to history of molar pregnancy       Date of Last Pap:      Lab Results   Component Value Date    PAP NIL 2019          Allergies   Allergen Reactions    Cats Itching and Shortness Of Breath     Current Outpatient Medications   Medication    docusate sodium (COLACE) 100 MG capsule    ferrous gluconate (FERGON) 324 (38 Fe) MG tablet    Prenatal Vit-Fe Fumarate-FA (PRENATAL MULTIVITAMIN  PLUS " Final Anesthesia Post-op Assessment    Patient: Kalani Nevarez  Procedure(s) Performed: LAPAROSCOPIC BILATERAL SALPINGECTOMY  Anesthesia type: General    Vitals Value Taken Time   Temp 37.4 10/13/22 1006   Pulse 92 10/13/22 1006   Resp 17 10/13/22 1006   SpO2 92 % 10/13/22 1006   /66 10/13/22 1004   Vitals shown include unvalidated device data.      Patient Location: PACU Phase 1  Post-op Vital Signs:stable  Level of Consciousness: awake  Respiratory Status: spontaneous ventilation  Cardiovascular stable  Hydration: euvolemic  Pain Management: adequately controlled  Handoff: Handoff to receiving clinician was performed and questions were answered  Vomiting: none  Nausea: None  Airway Patency:patent  Post-op Assessment: no complications, patient tolerated procedure well with no complications and dentition within defined limits      No complications documented.    "IRON) 27-1 MG TABS     No current facility-administered medications for this visit.      Hemoglobin   Date Value Ref Range Status   01/17/2024 11.5 (L) 11.7 - 15.7 g/dL Final   06/18/2021 13.0 11.7 - 15.7 g/dL Final   ]      Delivery date was 1/17/24. She had a induced vaginal delivery of a viable boy, weight 8#4.8 , with no complications.    Since delivery, she has been breast feeding.  She has No signs of infection, bleeding or other complications.      Last bleeding 1-2 weeks ago.    Pelvic floor concerns.      We discussed contraceptions and she has chosen condoms and periodic abstinence.    She  has had intercourse since delivery.    Patient screened for postpartum depression and symptoms are NEGATIVE.   PHQ-2 Score:         2/28/2024     2:46 PM 2/7/2024    10:11 AM   PHQ-2 ( 1999 [employer])   Q1: Little interest or pleasure in doing things 0 0   Q2: Feeling down, depressed or hopeless 0 0   PHQ-2 Score 0 0   Q1: Little interest or pleasure in doing things Not at all Not at all   Q2: Feeling down, depressed or hopeless Not at all Not at all   PHQ-2 Score 0 0         1/20/2020     1:23 PM 6/9/2021    10:36 AM 6/19/2023     9:06 AM   PHQ-9 SCORE   PHQ-9 Total Score 0 1 6       Screening has also been completed for intimate partner violence.          EXAM: /70 (BP Location: Right arm, Patient Position: Sitting, Cuff Size: Adult Regular)   Pulse 65   Temp 97.6  F (36.4  C) (Temporal)   Resp 14   Ht 1.651 m (5' 5\")   Wt 86.8 kg (191 lb 6.4 oz)   LMP 04/10/2023   SpO2 97%   Breastfeeding Yes   BMI 31.85 kg/m     Wt Readings from Last 3 Encounters:   02/28/24 86.8 kg (191 lb 6.4 oz)   02/07/24 86.9 kg (191 lb 9.6 oz)   01/19/24 89.2 kg (196 lb 11.2 oz)         GENERAL APPEARANCE: healthy, alert and no distress     EYES: EOMI, PERRL     NECK: no adenopathy, no asymmetry, masses, or scars and thyroid normal to palpation     RESP: lungs clear to auscultation - no rales, rhonchi or wheezes     CV: regular " rates and rhythm, no murmur     ABDOMEN:  soft, nontender, no HSM or masses and bowel sounds normal     MS: extremities normal- no gross deformities noted, no evidence of inflammation in joints, FROM in all extremities.     SKIN: no suspicious lesions or rashes     NEURO: Normal strength and tone, sensory exam grossly normal, mentation intact and speech normal     PSYCH: mentation appears normal. and affect normal/bright    ASSESSMENT:     ICD-10-CM    1. Routine postpartum follow-up  Z39.2       2. Pelvic floor dysfunction in female  M62.89 Physical Therapy  Referral            PLAN: 1.  PPBC plan is condoms/periodic abstinence   2.  Discussed exercise, activity, nutrition  3.  Discussed changes in sleep routine, sleep deficit monitoring  4.  HCM/Immunizations: pap smear due 2027; COVID vaccine declined   5.  Physical therapy referral placed     Pia Tong MD

## 2024-02-28 ENCOUNTER — PRENATAL OFFICE VISIT (OUTPATIENT)
Dept: FAMILY MEDICINE | Facility: OTHER | Age: 34
End: 2024-02-28
Attending: FAMILY MEDICINE
Payer: COMMERCIAL

## 2024-02-28 VITALS
HEART RATE: 65 BPM | BODY MASS INDEX: 31.89 KG/M2 | RESPIRATION RATE: 14 BRPM | HEIGHT: 65 IN | OXYGEN SATURATION: 97 % | TEMPERATURE: 97.6 F | DIASTOLIC BLOOD PRESSURE: 70 MMHG | SYSTOLIC BLOOD PRESSURE: 110 MMHG | WEIGHT: 191.4 LBS

## 2024-02-28 DIAGNOSIS — M62.89 PELVIC FLOOR DYSFUNCTION IN FEMALE: ICD-10-CM

## 2024-02-28 PROCEDURE — 99207 PR OB VISIT-NO CHARGE - GICH ONLY: CPT | Performed by: FAMILY MEDICINE

## 2024-02-28 ASSESSMENT — PAIN SCALES - GENERAL: PAINLEVEL: NO PAIN (0)

## 2024-02-28 NOTE — NURSING NOTE
"Chief Complaint   Patient presents with    Postpartum Care     6 week postpartum       Initial /70 (BP Location: Right arm, Patient Position: Sitting, Cuff Size: Adult Regular)   Pulse 65   Temp 97.6  F (36.4  C) (Temporal)   Resp 14   Ht 1.651 m (5' 5\")   Wt 86.8 kg (191 lb 6.4 oz)   LMP 04/10/2023   SpO2 97%   Breastfeeding Yes   BMI 31.85 kg/m   Estimated body mass index is 31.85 kg/m  as calculated from the following:    Height as of this encounter: 1.651 m (5' 5\").    Weight as of this encounter: 86.8 kg (191 lb 6.4 oz).    Medication Review: complete    The next two questions are to help us understand your food security.  If you are feeling you need any assistance in this area, we have resources available to support you today.          12/26/2023   SDOH- Food Insecurity   Within the past 12 months, did you worry that your food would run out before you got money to buy more? N   Within the past 12 months, did the food you bought just not last and you didn t have money to get more? N       Health Care Directive:  Patient does not have a Health Care Directive or Living Will: Discussed advance care planning with patient; however, patient declined at this time.    Basia Holland LPN      "

## 2024-04-01 ENCOUNTER — OFFICE VISIT (OUTPATIENT)
Dept: FAMILY MEDICINE | Facility: OTHER | Age: 34
End: 2024-04-01
Attending: PHYSICIAN ASSISTANT
Payer: COMMERCIAL

## 2024-04-01 VITALS
BODY MASS INDEX: 30.95 KG/M2 | SYSTOLIC BLOOD PRESSURE: 104 MMHG | OXYGEN SATURATION: 97 % | HEART RATE: 104 BPM | RESPIRATION RATE: 20 BRPM | TEMPERATURE: 98.2 F | WEIGHT: 186 LBS | DIASTOLIC BLOOD PRESSURE: 64 MMHG

## 2024-04-01 DIAGNOSIS — H65.91 OME (OTITIS MEDIA WITH EFFUSION), RIGHT: Primary | ICD-10-CM

## 2024-04-01 DIAGNOSIS — J02.9 SORE THROAT: ICD-10-CM

## 2024-04-01 LAB — GROUP A STREP BY PCR: NOT DETECTED

## 2024-04-01 PROCEDURE — 99213 OFFICE O/P EST LOW 20 MIN: CPT | Performed by: PHYSICIAN ASSISTANT

## 2024-04-01 PROCEDURE — 87651 STREP A DNA AMP PROBE: CPT | Mod: ZL | Performed by: PHYSICIAN ASSISTANT

## 2024-04-01 ASSESSMENT — PAIN SCALES - GENERAL: PAINLEVEL: MODERATE PAIN (5)

## 2024-04-01 NOTE — PROGRESS NOTES
"  Assessment & Plan       ICD-10-CM    1. OME (otitis media with effusion), right  H65.91 amoxicillin-clavulanate (AUGMENTIN) 875-125 MG tablet      2. Sore throat  J02.9 Group A Streptococcus PCR Throat Swab        Moderate right otitis media, will cover with 7 day course of Augmentin. Discussed risks, benefits and side effects of medication at length with patient. Continue with Tylenol, warm compresses and other symptomatic remedies of care. Return precautions reviewed.   Strep test returned negative, sore throat is most consistent with viral etiology/cause at this point in time.  Recommend to continue with symptomatic remedies including but not limited to: Salt water gargles, throat lozenges, soups/popsicles, increase hydration, alternating Tylenol and ibuprofen if needed/able and other symptomatic remedies.    BMI  Estimated body mass index is 30.95 kg/m  as calculated from the following:    Height as of 2/28/24: 1.651 m (5' 5\").    Weight as of this encounter: 84.4 kg (186 lb).   Weight management plan: Discussed healthy diet and exercise guidelines    See Patient Instructions    Return if symptoms worsen or fail to improve.    Subjective   Jenny is a 33 year old, presenting for the following health issues:  Illness (Acute/)        4/1/2024     9:32 AM   Additional Questions   Roomed by lauri esposito lpn     History of Present Illness       Reason for visit:  Sore throar and ears  Symptom onset:  3-7 days ago    She eats 2-3 servings of fruits and vegetables daily.She consumes 1 sweetened beverage(s) daily.She exercises with enough effort to increase her heart rate 10 to 19 minutes per day.  She exercises with enough effort to increase her heart rate 3 or less days per week.   She is taking medications regularly.     Jenny presents to the clinic today with an approximately 5 to 6-day history of illness.  Symptoms started with left-sided ear pain which has migrated to the right, however, she does state that both " ears are still quite bothersome.  She reports diarrhea and nausea as well as a sore throat.  She had a fever yesterday and last week, unsure of temperatures in between.  She has 2 young children who are school-aged that recently had similar symptoms.  Declines cough.  No chest pain or shortness of breath.  She states that she would like a strep swab today for possible as she recently completed a course of antibiotics about 7 weeks ago for strep throat (penicillin V).  Utilizing Tylenol as needed.  She is breast-feeding.    Review of Systems  Constitutional, HEENT, cardiovascular, pulmonary, GI, , musculoskeletal, neuro, skin, endocrine and psych systems are negative, except as otherwise noted.        Objective    /64 (BP Location: Left arm, Patient Position: Sitting, Cuff Size: Adult Large)   Pulse 104   Temp 98.2  F (36.8  C) (Tympanic)   Resp 20   Wt 84.4 kg (186 lb)   LMP 04/10/2023   SpO2 97%   Breastfeeding Yes   BMI 30.95 kg/m    Body mass index is 30.95 kg/m .  Physical Exam   GENERAL: alert and no distress  EYES: Eyes grossly normal to inspection, PERRL and conjunctivae and sclerae normal  HENT: normal cephalic/atraumatic, ear canals and TM's normal, nose and mouth without ulcers or lesions, oropharynx clear, oral mucous membranes moist, and posterior pharyngeal erythema  NECK: no adenopathy, no asymmetry, masses, or scars  RESP: lungs clear to auscultation - no rales, rhonchi or wheezes  CV: regular rate and rhythm, normal S1 S2, no S3 or S4, no murmur, click or rub, no peripheral edema  MS: no gross musculoskeletal defects noted, no edema  SKIN: no suspicious lesions or rashes  PSYCH: mentation appears normal, affect normal/bright  LYMPH: normal ant/post cervical, supraclavicular nodes    Results for orders placed or performed in visit on 04/01/24   Group A Streptococcus PCR Throat Swab     Status: Normal    Specimen: Throat; Swab   Result Value Ref Range    Group A strep by PCR Not  Detected Not Detected    Narrative    The Xpert Xpress Strep A test, performed on the [employer], is a rapid, qualitative in vitro diagnostic test for the detection of Streptococcus pyogenes (Group A ß-hemolytic Streptococcus, Strep A) in throat swab specimens from patients with signs and symptoms of pharyngitis. The Xpert Xpress Strep A test can be used as an aid in the diagnosis of Group A Streptococcal pharyngitis. The assay is not intended to monitor treatment for Group A Streptococcus infections. The Xpert Xpress Strep A test utilizes an automated real-time polymerase chain reaction (PCR) to detect Streptococcus pyogenes DNA.       Signed Electronically by: Phyllis Steele PA-C

## 2024-04-01 NOTE — PATIENT INSTRUCTIONS
"You were prescribed an antibiotic, please take into consideration the following information:  - Take entire course of antibiotic even if you start to feel better.  - Antibiotics can cause stomach upset including nausea and diarrhea. Read your bottle or ask the pharmacist if antibiotic can be taken with food to help prevent nausea. If you have symptoms of diarrhea you can take an over-the-counter probiotic and/or increase foods with probiotics such as yogurt, [place], sauerkraut.  -Use caution in sunlight as can lead to increased risk of sunburn while on ABX (antibiotics).     - For ear infection. Take entire course of antibiotics to ensure this clears (even if feeling better).  - Tylenol or ibuprofen for pain and fevers.   - Eat yogurt, kefir or take over-the-counter \"probiotic\" at least 2 hours before or after a dose of antibiotic. This will replace good bacteria that may have been lost due to the antibiotic. (This may also help to prevent yeast infections and upset stomach during the course of antibiotic.)  - In the future at onset of congestion: Blow nose or use bulb syringe to keep nasal congestion cleared and use saline nasal spray/flush.  -Alternative ibuprofen and tylenol as needed.   -Rest/relaxation and keeping hydrated with clear liquids (ie: water or gatorade). Using a humidifier may be beneficial as well.     * Recheck with family practice as needed or ER sooner with worsening or concerns.     If a strep test was performed:   We will call you with the results of the strep test, in the meantime, information below on viral colds/upper respiratory tract infections were provided (on average the test takes about 30-60 minutes to return).    If the strep test returns \"POSITIVE\" for strep, you will be prescribed an antibiotic, if this is the case, please take the entire course of antibiotic, even if feeling better prior to this. Continue with symptomatic treatment below as needed. If positive, please change " "toothbrush on day 2.     If the strep test returns \"NEGATIVE\" you do not need antibiotics and are to continue with conservative treatment as outlined below. Continue to monitor symptoms.     Symptomatic treatments recommended.  - Antibiotics will not help with your symptoms, unless you were told otherwise today (strep throat, ear infection, etc. ). Education provided on symptoms of secondary bacterial infection such as new fever, chills, rigors, shortness of breath, increased work of breathing, that can occur with viral URI and need for further evaluation, if they occur.   - Ensure you are staying hydrated by drinking plenty of fluids and eating mild foods and advance diet as tolerated  - Honey can be soothing for sore throat (as long as above 12 months of age)  - Warm salt water gurgles can help soothe sore throat  - Humidifier can help with congestion and help keep mucus membranes such as throat and nose from drying out.  - Sleeping slightly propped up can help with congestion and postnasal drainage that can worsen cough at bedtime.  - As long as you have never been told to take Tylenol and/or Ibuprofen you can use them to manage fever and body aches per package instructions  Make sure you eat when you take ibuprofen to avoid stomach upset.  - OTC cough medications per package instructions to help with cough. Check to see if the cough/cold medication already has acetaminophen (Tylenol) in it. If it does avoid taking additional Tylenol.  - If sudden onset of new fever, worsening symptoms return for further evaluation.  - OTC antihistamine such as Allegra, Zyrtec, Claritin (generic is okay) can help with nasal/sinus congestion and OTC nasal steroid such as Flonase can help decrease sinus inflammation to help with congestion.  - Education provided on symptoms of post-viral bacterial infections including ear infection and pneumonia. This would require re-evaluation for treatment.    "

## 2024-04-01 NOTE — PROGRESS NOTES
"No chief complaint on file.      Initial LMP 04/10/2023  Estimated body mass index is 31.85 kg/m  as calculated from the following:    Height as of 2/28/24: 1.651 m (5' 5\").    Weight as of 2/28/24: 86.8 kg (191 lb 6.4 oz).  Medication Review: complete    The next two questions are to help us understand your food security.  If you are feeling you need any assistance in this area, we have resources available to support you today.          12/26/2023   SDOH- Food Insecurity   Within the past 12 months, did you worry that your food would run out before you got money to buy more? N   Within the past 12 months, did the food you bought just not last and you didn t have money to get more? N     {If the answers are out of date, click here to update this information and refresh note to capture new information Link to SDOH- Food Insecurity   :168093}    Health Care Directive:  Patient does not have a Health Care Directive or Living Will: {ADVANCE_DIRECTIVE_STATUS:040472}    Antonia Urbina MA      "

## 2024-04-01 NOTE — NURSING NOTE
"Patient presents to the clinic for acute illness.    FOOD SECURITY SCREENING QUESTIONS:    The next two questions are to help us understand your food security.  If you are feeling you need any assistance in this area, we have resources available to support you today.    Hunger Vital Signs:  Within the past 12 months we worried whether our food would run out before we got money to buy more. Never  Within the past 12 months the food we bought just didn't last and we didn't have money to get more. Never    Advance Care Directive on file? no  Advance Care Directive provided to patient? Declined.      Chief Complaint   Patient presents with    Illness     Acute         Initial /64 (BP Location: Left arm, Patient Position: Sitting, Cuff Size: Adult Large)   Pulse 104   Temp 98.2  F (36.8  C) (Tympanic)   Resp 20   Wt 84.4 kg (186 lb)   LMP 04/10/2023   SpO2 97%   Breastfeeding Yes   BMI 30.95 kg/m   Estimated body mass index is 30.95 kg/m  as calculated from the following:    Height as of 2/28/24: 1.651 m (5' 5\").    Weight as of this encounter: 84.4 kg (186 lb).  Medication Reconciliation: complete        Odilia Phan LPN     "

## 2024-04-03 ENCOUNTER — THERAPY VISIT (OUTPATIENT)
Dept: PHYSICAL THERAPY | Facility: OTHER | Age: 34
End: 2024-04-03
Attending: FAMILY MEDICINE
Payer: COMMERCIAL

## 2024-04-03 DIAGNOSIS — G89.29 CHRONIC BILATERAL LOW BACK PAIN WITHOUT SCIATICA: ICD-10-CM

## 2024-04-03 DIAGNOSIS — M62.89 PELVIC FLOOR DYSFUNCTION IN FEMALE: Primary | ICD-10-CM

## 2024-04-03 DIAGNOSIS — M54.50 CHRONIC BILATERAL LOW BACK PAIN WITHOUT SCIATICA: ICD-10-CM

## 2024-04-03 PROCEDURE — 97530 THERAPEUTIC ACTIVITIES: CPT | Mod: GP

## 2024-04-03 PROCEDURE — 97161 PT EVAL LOW COMPLEX 20 MIN: CPT | Mod: GP

## 2024-04-03 PROCEDURE — 97112 NEUROMUSCULAR REEDUCATION: CPT | Mod: GP

## 2024-04-03 NOTE — PROGRESS NOTES
PHYSICAL THERAPY EVALUATION  Type of Visit: Evaluation    See electronic medical record for Abuse and Falls Screening details.    Subjective       Presenting condition or subjective complaint: pelvic floor dysfunction, urinary incontinence  Date of onset: 01/17/24 (vaginal delivery of 4th baby on 1/17)    Relevant medical history: Asthma; Pregnant or breastfeeding     Prior therapy history for the same diagnosis, illness or injury: No      Prior Level of Function  Transfers: Independent  Ambulation: Independent  ADL: Independent  IADL: Childcare, Driving, Housekeeping, Laundry, Meal preparation    Living Environment  Social support: With a significant other or spouse (and 4 kids)     Pain assessment: See objective evaluation for additional pain details     Objective      PELVIC EVALUATION  ADDITIONAL HISTORY:  Sex assigned at birth: Female    Bladder History:  Triggers for feeling of inability to wait to go to the bathroom: Yes laughing, jumping, running  Medications taken for bladder: No     Activities causing urine leak: Jump; Run; Other activities laughing    Bowel History:  Patient reports no typical bowel concerns, states one incidence of recent fecal incontinence while on anti-biotics for an infection    Sexual Function History:  Sexual orientation: Straight    Sexually active: Yes  Pelvic pain:    Patient denies pelvic pain, notes some cramping in pelvic region      Are you currently pregnant: No, Number of previous pregnancies: 5 (1 molar pregnancy), Number of deliveries: 4, If you have delivered before, did you have any of these issues during delivery: Episiotomy; Tearing; Vaginal delivery    Discussed reason for referral regarding pelvic health needs and external/internal pelvic floor muscle examination with patient/guardian.  Opportunity provided to ask questions and verbal consent for assessment and intervention was given.    PAIN: Patient reports no pelvic pain, notes cramping/discomfort, chronic LBP,  hip pain  POSTURE: WFL  LUMBAR SCREEN: AROM WFL, flexion moderately restricted, pain noted with flexion, extension, pain in R hip with L sidebend  HIP SCREEN:  Strength: WFL   Functional Strength Testing: SLS: unsteady L>R, no noted pain    PELVIS/SI SPECIAL TESTS:    Left Right   ASLR + +   Pelvic Compression - -     BREATHING SYMMETRY: WNL    PELVIC EXAM  External Visual Inspection:  At rest: Distended perineal body, Increased genital hiatal opening  With voluntary pelvic floor contraction: Absent  Relaxation of PFM: Partial/delayed relaxation  With intra-abdominal pressure: Cough: Perineal descent  Bearing down as defecation: Perineal descent    Integumentary:   Introitus: Loose, Introital gaping, Scar tissue/episiotomy     External Digital Palpation per Perineum:   Ischiocavernosis: Tightness  Bulbo cavernosis: Tightness  Transverse perineal: Tenderness  Levator ani: Tightness, Tenderness  Perineal body: Unremarkable  Public symphysis: Unremarkable    Internal Digital Palpation:  Per Vagina:  Tenderness  Digital Muscle Performance: patient unable to perform contraction with verbal and tactile cuing, patient bears down with attempts to contract  Relaxation Post-Contraction: Normal      Pelvic Organ Prolapse:   Anterior: Past the level of the hymen    ABDOMINAL ASSESSMENT  Diastasis Rectus Abdominis (MARLENE):  MARLENE presence: Yes   Above Umbilicus Depth/Finger width: 2 fingers   At Umbilicus Depth/Finger width: 3 fingers   Below Umbilicus Depth/Finger width: 3 fingers      Assessment & Plan   CLINICAL IMPRESSIONS  Medical Diagnosis: Pelvic floor dysfunction in female    Treatment Diagnosis: pelvic floor weakness with motor control deficits, anterior wall weakness, abdominal weakness; chronic low back and hip pain with strength and mobility deficits   Impression/Assessment: Patient is a 33 year old female with pelvic floor weakness, low back pain, and bilateral hip pain complaints.  The following significant findings  have been identified: Pain, Decreased ROM/flexibility, Decreased joint mobility, Decreased strength, Decreased proprioception, Impaired muscle performance, Decreased activity tolerance, and Instability. These impairments interfere with their ability to perform self care tasks, work tasks, recreational activities, household chores, household mobility, and community mobility as compared to previous level of function.     Clinical Decision Making (Complexity):  Clinical Presentation: Stable/Uncomplicated  Clinical Presentation Rationale: based on medical and personal factors listed in PT evaluation  Clinical Decision Making (Complexity): Low complexity    PLAN OF CARE  Treatment Interventions:  Modalities: Biofeedback, Cryotherapy, E-stim, Hot Pack, Ultrasound  Interventions: Gait Training, Manual Therapy, Neuromuscular Re-education, Therapeutic Activity, Therapeutic Exercise    Long Term Goals     PT Goal 1  Goal Identifier: PF contraction  Goal Description: patient will demonstrate motor coordination of pelvic floor contraction with strength of >2/5  Rationale: to maximize safety and independence with performance of ADLs and functional tasks;to maximize safety and independence with self cares;to maximize safety and independence within the home;to maximize safety and independence within the community  Target Date: 05/15/24  PT Goal 2  Goal Identifier: incontinence  Goal Description: patient will report 30% reduction of frequency of urinary leakage with activities with increased load on pelvif floor including laughing, jumping, running  Rationale: to maximize safety and independence within the home;to maximize safety and independence with performance of ADLs and functional tasks;to maximize safety and independence within the community;to maximize safety and independence with self cares  Target Date: 05/29/24  PT Goal 3  Goal Identifier: back pain  Goal Description: patient will report 25% reduction of low back pain  with activities including lifting, holding, and carrying her children  Rationale: to maximize safety and independence with performance of ADLs and functional tasks;to maximize safety and independence within the home;to maximize safety and independence with self cares;to maximize safety and independence within the community  Target Date: 06/26/24      Frequency of Treatment: 1-2x/week  Duration of Treatment: 12 weeks    Education Assessment:      Risks and benefits of evaluation/treatment have been explained.   Patient/Family/caregiver agrees with Plan of Care.     Evaluation Time:     PT Eval, Low Complexity Minutes (91157): 20     Signing Clinician: Brenda Boudreaux DPT

## 2024-04-08 ENCOUNTER — THERAPY VISIT (OUTPATIENT)
Dept: PHYSICAL THERAPY | Facility: OTHER | Age: 34
End: 2024-04-08
Attending: FAMILY MEDICINE
Payer: COMMERCIAL

## 2024-04-08 DIAGNOSIS — G89.29 CHRONIC BILATERAL LOW BACK PAIN WITHOUT SCIATICA: ICD-10-CM

## 2024-04-08 DIAGNOSIS — M54.50 CHRONIC BILATERAL LOW BACK PAIN WITHOUT SCIATICA: ICD-10-CM

## 2024-04-08 DIAGNOSIS — M62.89 PELVIC FLOOR DYSFUNCTION IN FEMALE: Primary | ICD-10-CM

## 2024-04-08 PROCEDURE — 97530 THERAPEUTIC ACTIVITIES: CPT | Mod: GP

## 2024-04-08 PROCEDURE — 97110 THERAPEUTIC EXERCISES: CPT | Mod: GP

## 2024-04-08 PROCEDURE — 97112 NEUROMUSCULAR REEDUCATION: CPT | Mod: GP

## 2024-04-10 ENCOUNTER — THERAPY VISIT (OUTPATIENT)
Dept: PHYSICAL THERAPY | Facility: OTHER | Age: 34
End: 2024-04-10
Attending: FAMILY MEDICINE
Payer: COMMERCIAL

## 2024-04-10 DIAGNOSIS — G89.29 CHRONIC BILATERAL LOW BACK PAIN WITHOUT SCIATICA: ICD-10-CM

## 2024-04-10 DIAGNOSIS — M62.89 PELVIC FLOOR DYSFUNCTION IN FEMALE: Primary | ICD-10-CM

## 2024-04-10 DIAGNOSIS — M54.50 CHRONIC BILATERAL LOW BACK PAIN WITHOUT SCIATICA: ICD-10-CM

## 2024-04-10 PROCEDURE — 97110 THERAPEUTIC EXERCISES: CPT | Mod: GP

## 2024-04-10 PROCEDURE — 97530 THERAPEUTIC ACTIVITIES: CPT | Mod: GP

## 2024-04-10 PROCEDURE — 97112 NEUROMUSCULAR REEDUCATION: CPT | Mod: GP

## 2024-04-17 ENCOUNTER — THERAPY VISIT (OUTPATIENT)
Dept: PHYSICAL THERAPY | Facility: OTHER | Age: 34
End: 2024-04-17
Attending: FAMILY MEDICINE
Payer: COMMERCIAL

## 2024-04-17 DIAGNOSIS — M54.50 CHRONIC BILATERAL LOW BACK PAIN WITHOUT SCIATICA: ICD-10-CM

## 2024-04-17 DIAGNOSIS — M62.89 PELVIC FLOOR DYSFUNCTION IN FEMALE: Primary | ICD-10-CM

## 2024-04-17 DIAGNOSIS — G89.29 CHRONIC BILATERAL LOW BACK PAIN WITHOUT SCIATICA: ICD-10-CM

## 2024-04-17 PROCEDURE — 97112 NEUROMUSCULAR REEDUCATION: CPT | Mod: GP

## 2024-04-17 PROCEDURE — 97530 THERAPEUTIC ACTIVITIES: CPT | Mod: GP

## 2024-04-17 PROCEDURE — 97110 THERAPEUTIC EXERCISES: CPT | Mod: GP

## 2024-04-19 ENCOUNTER — THERAPY VISIT (OUTPATIENT)
Dept: PHYSICAL THERAPY | Facility: OTHER | Age: 34
End: 2024-04-19
Attending: FAMILY MEDICINE
Payer: COMMERCIAL

## 2024-04-19 DIAGNOSIS — M62.89 PELVIC FLOOR DYSFUNCTION IN FEMALE: Primary | ICD-10-CM

## 2024-04-19 DIAGNOSIS — G89.29 CHRONIC BILATERAL LOW BACK PAIN WITHOUT SCIATICA: ICD-10-CM

## 2024-04-19 DIAGNOSIS — M54.50 CHRONIC BILATERAL LOW BACK PAIN WITHOUT SCIATICA: ICD-10-CM

## 2024-04-19 PROCEDURE — 97110 THERAPEUTIC EXERCISES: CPT | Mod: GP

## 2024-04-19 PROCEDURE — 97112 NEUROMUSCULAR REEDUCATION: CPT | Mod: GP

## 2024-04-19 PROCEDURE — 97530 THERAPEUTIC ACTIVITIES: CPT | Mod: GP

## 2024-04-19 PROCEDURE — 90901 BIOFEEDBACK TRAIN ANY METH: CPT | Mod: GP

## 2024-04-24 ENCOUNTER — THERAPY VISIT (OUTPATIENT)
Dept: PHYSICAL THERAPY | Facility: OTHER | Age: 34
End: 2024-04-24
Attending: FAMILY MEDICINE
Payer: COMMERCIAL

## 2024-04-24 DIAGNOSIS — G89.29 CHRONIC BILATERAL LOW BACK PAIN WITHOUT SCIATICA: ICD-10-CM

## 2024-04-24 DIAGNOSIS — M62.89 PELVIC FLOOR DYSFUNCTION IN FEMALE: Primary | ICD-10-CM

## 2024-04-24 DIAGNOSIS — M54.50 CHRONIC BILATERAL LOW BACK PAIN WITHOUT SCIATICA: ICD-10-CM

## 2024-04-24 PROCEDURE — 97530 THERAPEUTIC ACTIVITIES: CPT | Mod: GP

## 2024-04-24 PROCEDURE — 97112 NEUROMUSCULAR REEDUCATION: CPT | Mod: GP

## 2024-04-24 PROCEDURE — 97110 THERAPEUTIC EXERCISES: CPT | Mod: GP

## 2024-05-10 ENCOUNTER — THERAPY VISIT (OUTPATIENT)
Dept: PHYSICAL THERAPY | Facility: OTHER | Age: 34
End: 2024-05-10
Attending: FAMILY MEDICINE
Payer: COMMERCIAL

## 2024-05-10 DIAGNOSIS — M62.89 PELVIC FLOOR DYSFUNCTION IN FEMALE: Primary | ICD-10-CM

## 2024-05-10 DIAGNOSIS — M54.50 CHRONIC BILATERAL LOW BACK PAIN WITHOUT SCIATICA: ICD-10-CM

## 2024-05-10 DIAGNOSIS — G89.29 CHRONIC BILATERAL LOW BACK PAIN WITHOUT SCIATICA: ICD-10-CM

## 2024-05-10 PROCEDURE — 97112 NEUROMUSCULAR REEDUCATION: CPT | Mod: GP

## 2024-05-10 PROCEDURE — 97530 THERAPEUTIC ACTIVITIES: CPT | Mod: GP

## 2024-05-10 PROCEDURE — 97110 THERAPEUTIC EXERCISES: CPT | Mod: GP

## 2024-05-14 ENCOUNTER — MEDICAL CORRESPONDENCE (OUTPATIENT)
Dept: HEALTH INFORMATION MANAGEMENT | Facility: OTHER | Age: 34
End: 2024-05-14
Payer: COMMERCIAL

## 2024-05-22 ENCOUNTER — THERAPY VISIT (OUTPATIENT)
Dept: PHYSICAL THERAPY | Facility: OTHER | Age: 34
End: 2024-05-22
Attending: FAMILY MEDICINE
Payer: COMMERCIAL

## 2024-05-22 DIAGNOSIS — M62.89 PELVIC FLOOR DYSFUNCTION IN FEMALE: Primary | ICD-10-CM

## 2024-05-22 DIAGNOSIS — G89.29 CHRONIC BILATERAL LOW BACK PAIN WITHOUT SCIATICA: ICD-10-CM

## 2024-05-22 DIAGNOSIS — M54.50 CHRONIC BILATERAL LOW BACK PAIN WITHOUT SCIATICA: ICD-10-CM

## 2024-05-22 PROCEDURE — 97112 NEUROMUSCULAR REEDUCATION: CPT | Mod: GP

## 2024-05-22 PROCEDURE — 97530 THERAPEUTIC ACTIVITIES: CPT | Mod: GP

## 2024-05-22 PROCEDURE — 97110 THERAPEUTIC EXERCISES: CPT | Mod: GP

## 2024-05-29 ENCOUNTER — THERAPY VISIT (OUTPATIENT)
Dept: PHYSICAL THERAPY | Facility: OTHER | Age: 34
End: 2024-05-29
Attending: FAMILY MEDICINE
Payer: COMMERCIAL

## 2024-05-29 DIAGNOSIS — G89.29 CHRONIC BILATERAL LOW BACK PAIN WITHOUT SCIATICA: ICD-10-CM

## 2024-05-29 DIAGNOSIS — M62.89 PELVIC FLOOR DYSFUNCTION IN FEMALE: Primary | ICD-10-CM

## 2024-05-29 DIAGNOSIS — M54.50 CHRONIC BILATERAL LOW BACK PAIN WITHOUT SCIATICA: ICD-10-CM

## 2024-05-29 PROCEDURE — 97530 THERAPEUTIC ACTIVITIES: CPT | Mod: GP

## 2024-05-29 PROCEDURE — 97110 THERAPEUTIC EXERCISES: CPT | Mod: GP

## 2024-05-29 PROCEDURE — 97112 NEUROMUSCULAR REEDUCATION: CPT | Mod: GP

## 2024-06-05 ENCOUNTER — THERAPY VISIT (OUTPATIENT)
Dept: PHYSICAL THERAPY | Facility: OTHER | Age: 34
End: 2024-06-05
Attending: FAMILY MEDICINE
Payer: COMMERCIAL

## 2024-06-05 DIAGNOSIS — G89.29 CHRONIC BILATERAL LOW BACK PAIN WITHOUT SCIATICA: ICD-10-CM

## 2024-06-05 DIAGNOSIS — M62.89 PELVIC FLOOR DYSFUNCTION IN FEMALE: Primary | ICD-10-CM

## 2024-06-05 DIAGNOSIS — M54.50 CHRONIC BILATERAL LOW BACK PAIN WITHOUT SCIATICA: ICD-10-CM

## 2024-06-05 PROCEDURE — 97530 THERAPEUTIC ACTIVITIES: CPT | Mod: GP

## 2024-06-05 PROCEDURE — 97112 NEUROMUSCULAR REEDUCATION: CPT | Mod: GP

## 2024-06-05 PROCEDURE — 97110 THERAPEUTIC EXERCISES: CPT | Mod: GP

## 2024-07-12 ENCOUNTER — OFFICE VISIT (OUTPATIENT)
Dept: FAMILY MEDICINE | Facility: OTHER | Age: 34
End: 2024-07-12
Payer: COMMERCIAL

## 2024-07-12 VITALS
WEIGHT: 184.9 LBS | DIASTOLIC BLOOD PRESSURE: 76 MMHG | TEMPERATURE: 98.1 F | HEIGHT: 66 IN | BODY MASS INDEX: 29.72 KG/M2 | OXYGEN SATURATION: 97 % | HEART RATE: 81 BPM | SYSTOLIC BLOOD PRESSURE: 132 MMHG | RESPIRATION RATE: 15 BRPM

## 2024-07-12 DIAGNOSIS — J02.9 SORE THROAT: ICD-10-CM

## 2024-07-12 DIAGNOSIS — J02.9 ACUTE VIRAL PHARYNGITIS: Primary | ICD-10-CM

## 2024-07-12 LAB — GROUP A STREP BY PCR: NOT DETECTED

## 2024-07-12 PROCEDURE — 87651 STREP A DNA AMP PROBE: CPT | Mod: ZL

## 2024-07-12 PROCEDURE — 99213 OFFICE O/P EST LOW 20 MIN: CPT

## 2024-07-12 ASSESSMENT — PAIN SCALES - GENERAL: PAINLEVEL: SEVERE PAIN (7)

## 2024-07-12 NOTE — PROGRESS NOTES
ASSESSMENT/PLAN:    I have reviewed the nursing notes.  I have reviewed the findings, diagnosis, plan and need for follow up with the patient.    1. Acute viral pharyngitis  2. Sore throat  - Group A Streptococcus PCR Throat Swab    Patient presents with a sore throat.  Patient's vitals are stable and she appears nontoxic.  Strep test was negative. Discussed with patient that symptoms and exam are consistent with viral illness.  Discussed that symptomatic treatment is appropriate but not with antibiotics. Discussed symptomatic treatment - Encouraged fluids, salt water gargles, honey (only if greater than 1 year in age due to risk of botulism), elevation, humidifier, sinus rinse/netti pot, lozenges, tea, topical vapor rub, popsicles, rest, etc. May use over-the-counter Tylenol or ibuprofen PRN.    Discussed warning signs/symptoms indicative of need to f/u    Follow up if symptoms persist or worsen or concerns    I explained my diagnostic considerations and recommendations to the patient, who voiced understanding and agreement with the treatment plan. All questions were answered. We discussed potential side effects of any prescribed or recommended therapies, as well as expectations for response to treatments.    Neo Blanco, ANDREA CNP  7/12/2024  2:17 PM    HPI:    Jenny Hadley is a 33 year old female  who presents to [facility] today for concerns of sore throat    sore throat, x 1 week duration.    Yes Difficulty swallowing, breathing or handling own secretions.   No fevers or chills.   Yes allergy/URI Symptoms.   Yes Otalgia  Yes Muffled Sounds/Change in Hearing.   Yes Sensation of Fullness in Ear(s).   No Ringing in Ears/Tinnitus.   No Headache.   Yes Congestion (head/nasal/chest).   No Cough/Productive Cough.   No Post Nasal Drip  No Sinus Pain/Pressure.   No Myalgias.   No nausea, vomiting and/or diarrhea.   No rash.     No change to bowel or bladder habits. Fatigue/energy level changes: Yes.  Change to appetite/fluid intake: Yes    Any prior HEENT surgery for removal of tonsils or adenoids: No  Recent antibiotic use: No  Exposure to sick contacts including: strep, influenza, COVID, other bacterial or viral illnesses - unknown  Exposure site: unknown  Treatments tried: none    Additional symptoms to report: none    PCP: Ifeanyi Stafford      Past Medical History:   Diagnosis Date    Anemia     Carrier of group B Streptococcus     Complication of anesthesia     History of prior pregnancies 2019 1/24    Molar pregnancy 05/23/2018     Past Surgical History:   Procedure Laterality Date    DENTAL SURGERY      wisdom teeth    DILATION AND CURETTAGE SUCTION N/A 5/16/2018    Procedure: DILATION AND CURETTAGE SUCTION;  Suction Dilation & Curettage;  Surgeon: Wily Mars MD;  Location:  OR     Social History     Tobacco Use    Smoking status: Never     Passive exposure: Past    Smokeless tobacco: Never   Substance Use Topics    Alcohol use: Not Currently     Alcohol/week: 0.0 standard drinks of alcohol     Comment: occasional     Current Outpatient Medications   Medication Sig Dispense Refill    amoxicillin-clavulanate (AUGMENTIN) 875-125 MG tablet Take 1 tablet by mouth 2 times daily 14 tablet 0    docusate sodium (COLACE) 100 MG capsule Take 100 mg by mouth every other day (Patient not taking: Reported on 7/12/2024)      ferrous gluconate (FERGON) 324 (38 Fe) MG tablet Take 1 tablet (324 mg) by mouth daily with food (Patient not taking: Reported on 7/12/2024) 90 tablet 3    Prenatal Vit-Fe Fumarate-FA (PRENATAL MULTIVITAMIN  PLUS IRON) 27-1 MG TABS Take 1 tablet by mouth daily (Patient not taking: Reported on 7/12/2024) 90 tablet 3     Allergies   Allergen Reactions    Cats Itching and Shortness Of Breath     Past medical history, past surgical history, current medications and allergies reviewed and accurate to the best of my knowledge.      ROS:  Refer to HPI    /76   Pulse 81   Temp 98.1  F (36.7  " C) (Tympanic)   Resp 15   Ht 1.664 m (5' 5.5\")   Wt 83.9 kg (184 lb 14.4 oz)   SpO2 97%   Breastfeeding Yes   BMI 30.30 kg/m      EXAM:  General Appearance: Well appearing 33 year old female, appropriate appearance for age. No acute distress   Ears: Left TM intact, translucent with bony landmarks appreciated, no erythema, no effusion, no bulging, no purulence.  Right TM intact, translucent with bony landmarks appreciated, no erythema, no effusion, no bulging, no purulence.  Left auditory canal clear.  Right auditory canal clear.  Normal external ears, non tender.  Eyes: conjunctivae normal without erythema or irritation, corneas clear, no drainage or crusting, no eyelid swelling, pupils equal   Oropharynx: moist mucous membranes, posterior pharynx with erythema, tonsils symmetric and 1+, mild erythema, no exudates or petechiae, no post nasal drip seen, no trismus, voice clear.    Nose:  Bilateral nares: no erythema, no edema, no drainage or congestion   Neck: supple without adenopathy  Respiratory: normal chest wall and respirations.  Normal effort.  Clear to auscultation bilaterally, no wheezing, crackles or rhonchi.  No increased work of breathing.  No cough appreciated.  Cardiac: RRR with no murmurs  Musculoskeletal:  Equal movement of bilateral upper extremities.  Equal movement of bilateral lower extremities.  Normal gait.    Dermatological: no rashes noted of exposed skin  Neuro: Alert and oriented to person, place, and time.    Psychological: normal affect, alert, oriented, and pleasant.     Labs:  Results for orders placed or performed in visit on 07/12/24   Group A Streptococcus PCR Throat Swab     Status: Normal    Specimen: Throat; Swab   Result Value Ref Range    Group A strep by PCR Not Detected Not Detected    Narrative    The Xpert Xpress Strep A test, performed on the [employer], is a rapid, qualitative in vitro diagnostic test for the detection of Streptococcus pyogenes " (Group A ß-hemolytic Streptococcus, Strep A) in throat swab specimens from patients with signs and symptoms of pharyngitis. The Xpert Xpress Strep A test can be used as an aid in the diagnosis of Group A Streptococcal pharyngitis. The assay is not intended to monitor treatment for Group A Streptococcus infections. The Xpert Xpress Strep A test utilizes an automated real-time polymerase chain reaction (PCR) to detect Streptococcus pyogenes DNA.

## 2024-07-12 NOTE — NURSING NOTE
"Chief Complaint   Patient presents with    Pharyngitis     Patient has sore throat for 1 week. Pain when swallowing. Has had confirmed strep twice since January.  Initial /76   Pulse 81   Temp 98.1  F (36.7  C) (Tympanic)   Resp 15   Ht 1.664 m (5' 5.5\")   Wt 83.9 kg (184 lb 14.4 oz)   SpO2 97%   Breastfeeding Yes   BMI 30.30 kg/m   Estimated body mass index is 30.3 kg/m  as calculated from the following:    Height as of this encounter: 1.664 m (5' 5.5\").    Weight as of this encounter: 83.9 kg (184 lb 14.4 oz).  Medication Review: complete    The next two questions are to help us understand your food security.  If you are feeling you need any assistance in this area, we have resources available to support you today.          12/26/2023   SDOH- Food Insecurity   Within the past 12 months, did you worry that your food would run out before you got money to buy more? N   Within the past 12 months, did the food you bought just not last and you didn t have money to get more? N            Health Care Directive:  Patient does not have a Health Care Directive or Living Will: Discussed advance care planning with patient; however, patient declined at this time.    Antonia Urbina MA      "

## 2024-07-13 ENCOUNTER — HEALTH MAINTENANCE LETTER (OUTPATIENT)
Age: 34
End: 2024-07-13

## 2024-07-19 ENCOUNTER — THERAPY VISIT (OUTPATIENT)
Dept: PHYSICAL THERAPY | Facility: OTHER | Age: 34
End: 2024-07-19
Attending: FAMILY MEDICINE
Payer: COMMERCIAL

## 2024-07-19 DIAGNOSIS — M62.89 PELVIC FLOOR DYSFUNCTION IN FEMALE: Primary | ICD-10-CM

## 2024-07-19 DIAGNOSIS — M54.50 CHRONIC BILATERAL LOW BACK PAIN WITHOUT SCIATICA: ICD-10-CM

## 2024-07-19 DIAGNOSIS — G89.29 CHRONIC BILATERAL LOW BACK PAIN WITHOUT SCIATICA: ICD-10-CM

## 2024-07-19 PROCEDURE — 90901 BIOFEEDBACK TRAIN ANY METH: CPT | Mod: GP

## 2024-07-19 PROCEDURE — 97110 THERAPEUTIC EXERCISES: CPT | Mod: GP

## 2024-07-19 PROCEDURE — 97530 THERAPEUTIC ACTIVITIES: CPT | Mod: GP

## 2024-07-19 PROCEDURE — 97112 NEUROMUSCULAR REEDUCATION: CPT | Mod: GP

## 2024-07-19 PROCEDURE — 97032 APPL MODALITY 1+ESTIM EA 15: CPT | Mod: GP

## 2024-08-30 ENCOUNTER — THERAPY VISIT (OUTPATIENT)
Dept: PHYSICAL THERAPY | Facility: OTHER | Age: 34
End: 2024-08-30
Attending: FAMILY MEDICINE
Payer: COMMERCIAL

## 2024-08-30 DIAGNOSIS — G89.29 CHRONIC BILATERAL LOW BACK PAIN WITHOUT SCIATICA: ICD-10-CM

## 2024-08-30 DIAGNOSIS — M62.89 PELVIC FLOOR DYSFUNCTION IN FEMALE: Primary | ICD-10-CM

## 2024-08-30 DIAGNOSIS — M54.50 CHRONIC BILATERAL LOW BACK PAIN WITHOUT SCIATICA: ICD-10-CM

## 2024-08-30 PROCEDURE — 97112 NEUROMUSCULAR REEDUCATION: CPT | Mod: GP

## 2024-08-30 PROCEDURE — 97530 THERAPEUTIC ACTIVITIES: CPT | Mod: GP

## 2024-08-30 PROCEDURE — 97110 THERAPEUTIC EXERCISES: CPT | Mod: GP

## 2024-09-04 ENCOUNTER — THERAPY VISIT (OUTPATIENT)
Dept: PHYSICAL THERAPY | Facility: OTHER | Age: 34
End: 2024-09-04
Attending: FAMILY MEDICINE
Payer: COMMERCIAL

## 2024-09-04 DIAGNOSIS — M54.50 CHRONIC BILATERAL LOW BACK PAIN WITHOUT SCIATICA: ICD-10-CM

## 2024-09-04 DIAGNOSIS — G89.29 CHRONIC BILATERAL LOW BACK PAIN WITHOUT SCIATICA: ICD-10-CM

## 2024-09-04 DIAGNOSIS — M62.89 PELVIC FLOOR DYSFUNCTION IN FEMALE: Primary | ICD-10-CM

## 2024-09-04 PROCEDURE — 97110 THERAPEUTIC EXERCISES: CPT | Mod: GP

## 2024-09-04 PROCEDURE — 97112 NEUROMUSCULAR REEDUCATION: CPT | Mod: GP

## 2024-09-04 PROCEDURE — 97530 THERAPEUTIC ACTIVITIES: CPT | Mod: GP

## 2024-09-05 NOTE — PROGRESS NOTES
06/05/24 0500   Appointment Info   Signing clinician's name / credentials Brenda Boudreaux DPT   Visits Used 10   Medical Diagnosis Pelvic floor dysfunction in female   PT Tx Diagnosis pelvic floor weakness with motor control deficits, anterior wall weakness, abdominal weakness; chronic low back and hip pain with strength and mobility deficits   Progress Note/Certification   Onset of illness/injury or Date of Surgery 01/17/24  (vaginal delivery of 4th baby on 1/17)   Therapy Frequency 1-2x/week   Predicted Duration 12 weeks   Progress Note Completed Date 04/03/24   PT Goal 1   Goal Identifier PF contraction   Goal Description patient will demonstrate motor coordination of pelvic floor contraction with strength of >2/5   Rationale to maximize safety and independence with performance of ADLs and functional tasks;to maximize safety and independence with self cares;to maximize safety and independence within the home;to maximize safety and independence within the community   Goal Progress 2/5 strength, improved from 0/5 at eval   Target Date 05/15/24   Date Met 05/29/24   PT Goal 2   Goal Identifier incontinence   Goal Description patient will report 30% reduction of frequency of urinary leakage with activities with increased load on pelvic floor including laughing, jumping, running   Rationale to maximize safety and independence within the home;to maximize safety and independence with performance of ADLs and functional tasks;to maximize safety and independence within the community;to maximize safety and independence with self cares   Target Date 05/29/24   Goal Progress not met, continue progressing   PT Goal 3   Goal Identifier back pain   Goal Description patient will report 25% reduction of low back pain with activities including lifting, holding, and carrying her children   Rationale to maximize safety and independence with performance of ADLs and functional tasks;to maximize safety and independence within the  home;to maximize safety and independence with self cares;to maximize safety and independence within the community   Target Date 06/26/24   Goal Progress not met, continue progressing   Subjective Report   Subjective Report Patient reports feeling overall soreness, notes that she's been out and about, doing more things.   Objective Measures   Objective Measures Objective Measure 1   Objective Measure 1   Objective Measure PF contraction   Details 2/3/3; slightly improved motor control, bears down when attempting contraction ~25% of the time; able to perform the knack - pelvic floor contracts with knack, is able to avoid bearing down with coughing   Objective Measure 2   Objective Measure muscle tension, tenderness   Details bilateral adductors proximal>distal, ischiocavernosis, bulbocavernosis, levator ani   Therapeutic Procedure/Exercise   Therapeutic Procedures: strength, endurance, ROM, flexibility minutes (80522) 15   Ther Proc 1 pelvic/hip stability   Ther Proc 1 - Details supine hip ER/IR rocking; clamshell, reverse clamshell, s/l hip abd, hip abd/ER/IR combo; s/l thoracic rotation; figure 4 stretch, ITB stretch; half kneeling adductor mobilization  (deferred: hooklying lower trunk rotation; breann pose with sidebend)   Patient Response/Progress positive   Therapeutic Activity   Therapeutic Activities: dynamic activities to improve functional performance minutes (12988) 15   Ther Act 1 education   Ther Act 1 - Details reviewed layers & function of pelvic floor musculature; function and layers of abdominal musculature, implications of MARLENE; pelvic floor & TA co-contractors; relationship of pelvic floor with diaphragm, movement with breathing   Ther Act 2 breathing   Ther Act 2 - Details diaphragmatic breathing in supine, seated, quadruped; pursed lips breathing in supine, seated, quadruped   Ther Act 3 PF contraction   Ther Act 3 - Details PF contraction in seated, progressed with posterior tilt to bias levator  "ani, and anterior tilt to bias anterion PF, seated with lean forward, sit<>stand, wall slides stand & reach, quadruped alt arm reach, alt leg reach; TA contraction supine with hands for tactile feedback, seated, quadruped; submax contraction with 15 sec hold seated with lean forward x4  (deferred: submax contraction with forward/backward bounds 4 x3/side; standing with march, mini squat, slow SL \"running man\"; progressed with small range ASLR;)   Patient Response/Progress well tolerated   Neuromuscular Re-education   Neuromuscular re-ed of mvmt, balance, coord, kinesthetic sense, posture, proprioception minutes (17842) 15   Neuro Re-ed 1 proprioception, balance   Neuro Re-ed 1 - Details seated on balance cushion diaphragmatic breathing with cushion for proprioception of pelvic floor mobility; balance on cushion with pelvic tilts ant/post, lateral, circles, march, leg open/close; supine hips on cushion march, pelvic rotation \"pistons\", 90-90 rotation; supine shoulders on cushion thoracic ext/flex & w/rotation  (deferred: with foam roller s/l multifidi activation with rotation; isos glutes, HS, adductors, bridge with adductors)   Patient Response/Progress positive   Plan   Home program HEP, handout given  ([employer] access code [email or web address])   Plan for next session f/u in ~1 month; biofeedback, consider estim; progress pelvic floor strengthening   Comments   Pelvic Health Informed Consent Statement Discussed with patient/guardian reason for referral regarding pelvic health needs and external/internal pelvic floor muscle examination.  Opportunity provided to ask questions and verbal consent for assessment and intervention was given.   Total Session Time   Timed Code Treatment Minutes 45   Total Treatment Time (sum of timed and untimed services) 45         PLAN  Continue therapy per current plan of care.    Beginning/End Dates of Progress Note Reporting Period:  04/03/24 to 06/05/2024    Referring Provider:  Pia CARBAJAL" Ifeanyi Stafford

## 2024-09-11 ENCOUNTER — THERAPY VISIT (OUTPATIENT)
Dept: PHYSICAL THERAPY | Facility: OTHER | Age: 34
End: 2024-09-11
Attending: FAMILY MEDICINE
Payer: COMMERCIAL

## 2024-09-11 DIAGNOSIS — M54.50 CHRONIC BILATERAL LOW BACK PAIN WITHOUT SCIATICA: ICD-10-CM

## 2024-09-11 DIAGNOSIS — G89.29 CHRONIC BILATERAL LOW BACK PAIN WITHOUT SCIATICA: ICD-10-CM

## 2024-09-11 DIAGNOSIS — M62.89 PELVIC FLOOR DYSFUNCTION IN FEMALE: Primary | ICD-10-CM

## 2024-09-11 PROCEDURE — 97112 NEUROMUSCULAR REEDUCATION: CPT | Mod: GP

## 2024-09-11 PROCEDURE — 97530 THERAPEUTIC ACTIVITIES: CPT | Mod: GP

## 2024-09-11 PROCEDURE — 97110 THERAPEUTIC EXERCISES: CPT | Mod: GP

## 2024-09-18 ENCOUNTER — THERAPY VISIT (OUTPATIENT)
Dept: PHYSICAL THERAPY | Facility: OTHER | Age: 34
End: 2024-09-18
Attending: FAMILY MEDICINE
Payer: COMMERCIAL

## 2024-09-18 DIAGNOSIS — M62.89 PELVIC FLOOR DYSFUNCTION IN FEMALE: Primary | ICD-10-CM

## 2024-09-18 DIAGNOSIS — M54.50 CHRONIC BILATERAL LOW BACK PAIN WITHOUT SCIATICA: ICD-10-CM

## 2024-09-18 DIAGNOSIS — G89.29 CHRONIC BILATERAL LOW BACK PAIN WITHOUT SCIATICA: ICD-10-CM

## 2024-09-18 PROCEDURE — 97530 THERAPEUTIC ACTIVITIES: CPT | Mod: GP

## 2024-09-18 PROCEDURE — 97110 THERAPEUTIC EXERCISES: CPT | Mod: GP

## 2024-09-18 PROCEDURE — 97112 NEUROMUSCULAR REEDUCATION: CPT | Mod: GP

## 2024-09-25 ENCOUNTER — THERAPY VISIT (OUTPATIENT)
Dept: PHYSICAL THERAPY | Facility: OTHER | Age: 34
End: 2024-09-25
Attending: FAMILY MEDICINE
Payer: COMMERCIAL

## 2024-09-25 DIAGNOSIS — G89.29 CHRONIC BILATERAL LOW BACK PAIN WITHOUT SCIATICA: ICD-10-CM

## 2024-09-25 DIAGNOSIS — M62.89 PELVIC FLOOR DYSFUNCTION IN FEMALE: Primary | ICD-10-CM

## 2024-09-25 DIAGNOSIS — M54.50 CHRONIC BILATERAL LOW BACK PAIN WITHOUT SCIATICA: ICD-10-CM

## 2024-09-25 PROCEDURE — 97110 THERAPEUTIC EXERCISES: CPT | Mod: GP

## 2024-09-25 PROCEDURE — 97530 THERAPEUTIC ACTIVITIES: CPT | Mod: GP

## 2024-09-25 PROCEDURE — 97112 NEUROMUSCULAR REEDUCATION: CPT | Mod: GP

## 2025-07-19 ENCOUNTER — HEALTH MAINTENANCE LETTER (OUTPATIENT)
Age: 35
End: 2025-07-19

## (undated) DEVICE — TUBING SUCTION BOTTLE ASSEMBLY DISP 20714

## (undated) DEVICE — SLEEVE COMPRESSION SCD KNEE MED 74022

## (undated) DEVICE — TUBING VACUUM COLLECTION 6FT 23116

## (undated) DEVICE — NDL SPINAL 20GA 3.5" 405182

## (undated) DEVICE — PREP SKIN SCRUB TRAY 4461A

## (undated) DEVICE — PAD CHUX UNDERPAD 30X36" P3036C

## (undated) DEVICE — GLOVE PROTEXIS POWDER FREE SMT 7.5  2D72PT75X

## (undated) DEVICE — PACK LITHOTOMY SBA15LIFCA

## (undated) DEVICE — SUCTION CANNULA UTERINE 08MM CVD  20317

## (undated) DEVICE — PAD PERI INDIV WRAP 11" 2022A

## (undated) DEVICE — SYR 10ML FINGER CONTROL W/O NDL 309695

## (undated) DEVICE — SOL WATER 1500ML

## (undated) DEVICE — STRAP STIRRUP W/O SLIP 30187-020

## (undated) DEVICE — LIGHT HANDLES PLASTIC

## (undated) DEVICE — PANTIES MESH LG/XLG 2PK 706M2

## (undated) DEVICE — SPECIMEN TRAP VACUUM SUCTION 003984-901

## (undated) DEVICE — DRSG TELFA 3X8" 1238

## (undated) RX ORDER — BUPIVACAINE HYDROCHLORIDE AND EPINEPHRINE 5; 5 MG/ML; UG/ML
INJECTION, SOLUTION EPIDURAL; INTRACAUDAL; PERINEURAL
Status: DISPENSED
Start: 2018-05-16

## (undated) RX ORDER — ONDANSETRON 2 MG/ML
INJECTION INTRAMUSCULAR; INTRAVENOUS
Status: DISPENSED
Start: 2018-05-16

## (undated) RX ORDER — ACETAMINOPHEN 10 MG/ML
INJECTION, SOLUTION INTRAVENOUS
Status: DISPENSED
Start: 2018-05-16

## (undated) RX ORDER — SODIUM CHLORIDE, SODIUM LACTATE, POTASSIUM CHLORIDE, CALCIUM CHLORIDE 600; 310; 30; 20 MG/100ML; MG/100ML; MG/100ML; MG/100ML
INJECTION, SOLUTION INTRAVENOUS
Status: DISPENSED
Start: 2019-11-21

## (undated) RX ORDER — LIDOCAINE HYDROCHLORIDE 10 MG/ML
INJECTION, SOLUTION EPIDURAL; INFILTRATION; INTRACAUDAL; PERINEURAL
Status: DISPENSED
Start: 2018-05-16

## (undated) RX ORDER — LIDOCAINE HYDROCHLORIDE 10 MG/ML
INJECTION, SOLUTION EPIDURAL; INFILTRATION; INTRACAUDAL; PERINEURAL
Status: DISPENSED
Start: 2018-08-24

## (undated) RX ORDER — PROPOFOL 10 MG/ML
INJECTION, EMULSION INTRAVENOUS
Status: DISPENSED
Start: 2018-05-16

## (undated) RX ORDER — MORPHINE SULFATE 0.5 MG/ML
INJECTION, SOLUTION EPIDURAL; INTRATHECAL; INTRAVENOUS
Status: DISPENSED
Start: 2019-11-21

## (undated) RX ORDER — LIDOCAINE HYDROCHLORIDE 20 MG/ML
INJECTION, SOLUTION EPIDURAL; INFILTRATION; INTRACAUDAL; PERINEURAL
Status: DISPENSED
Start: 2018-05-16

## (undated) RX ORDER — LIDOCAINE HYDROCHLORIDE 10 MG/ML
INJECTION, SOLUTION INFILTRATION; PERINEURAL
Status: DISPENSED
Start: 2019-02-25

## (undated) RX ORDER — OXYTOCIN 10 [USP'U]/ML
INJECTION, SOLUTION INTRAMUSCULAR; INTRAVENOUS
Status: DISPENSED
Start: 2018-05-16

## (undated) RX ORDER — KETAMINE HYDROCHLORIDE 50 MG/ML
INJECTION, SOLUTION INTRAMUSCULAR; INTRAVENOUS
Status: DISPENSED
Start: 2018-05-16

## (undated) RX ORDER — KETOROLAC TROMETHAMINE 30 MG/ML
INJECTION, SOLUTION INTRAMUSCULAR; INTRAVENOUS
Status: DISPENSED
Start: 2018-05-16

## (undated) RX ORDER — FENTANYL CITRATE 50 UG/ML
INJECTION, SOLUTION INTRAMUSCULAR; INTRAVENOUS
Status: DISPENSED
Start: 2018-05-16